# Patient Record
Sex: FEMALE | Race: WHITE | NOT HISPANIC OR LATINO | Employment: UNEMPLOYED | URBAN - NONMETROPOLITAN AREA
[De-identification: names, ages, dates, MRNs, and addresses within clinical notes are randomized per-mention and may not be internally consistent; named-entity substitution may affect disease eponyms.]

---

## 2017-01-19 ENCOUNTER — OFFICE VISIT (OUTPATIENT)
Dept: INTERNAL MEDICINE | Facility: CLINIC | Age: 42
End: 2017-01-19

## 2017-01-19 VITALS
BODY MASS INDEX: 32.79 KG/M2 | RESPIRATION RATE: 12 BRPM | HEIGHT: 65 IN | DIASTOLIC BLOOD PRESSURE: 80 MMHG | OXYGEN SATURATION: 98 % | WEIGHT: 196.8 LBS | SYSTOLIC BLOOD PRESSURE: 110 MMHG | HEART RATE: 81 BPM

## 2017-01-19 DIAGNOSIS — G43.111 INTRACTABLE MIGRAINE WITH AURA WITH STATUS MIGRAINOSUS: ICD-10-CM

## 2017-01-19 DIAGNOSIS — G43.109 MIGRAINE WITH AURA AND WITHOUT STATUS MIGRAINOSUS, NOT INTRACTABLE: Primary | ICD-10-CM

## 2017-01-19 DIAGNOSIS — G47.39 OTHER SLEEP APNEA: ICD-10-CM

## 2017-01-19 PROCEDURE — 96372 THER/PROPH/DIAG INJ SC/IM: CPT | Performed by: FAMILY MEDICINE

## 2017-01-19 PROCEDURE — 99214 OFFICE O/P EST MOD 30 MIN: CPT | Performed by: FAMILY MEDICINE

## 2017-01-19 RX ORDER — PROMETHAZINE HYDROCHLORIDE 25 MG/ML
25 INJECTION, SOLUTION INTRAMUSCULAR; INTRAVENOUS ONCE
Status: COMPLETED | OUTPATIENT
Start: 2017-01-19 | End: 2017-01-19

## 2017-01-19 RX ADMIN — PROMETHAZINE HYDROCHLORIDE 25 MG: 25 INJECTION, SOLUTION INTRAMUSCULAR; INTRAVENOUS at 17:15

## 2017-01-19 NOTE — PROGRESS NOTES
C/O migraine x 2 days. Has taken Ibuprofen. Her former doc in [place] would give her a Toradol shot and it helped.     SUBJECTIVE: Vanna Nicolas is a 41 y.o. female seen for a follow up visit;      Migraine - woke up like this 2 days ago.  Likely has sleep apnea from her sleep study, she is getting another sleep study to determine what she needs.  The last one she had this bad was last month.  She has been on propranolol for several years, it helped at first to decrease frequency of migraines, but hasn't been helping recently.  She is getting them weekly right now, waking up with them so doesn't get a chance to take a med to stop them beforehand.  She is missing more work than her FMLA allows right now.       The following portions of the patient's history were reviewed and updated as appropriate: She  has a past medical history of Malignant melanoma.  She has Depression; Migraine with aura and without status migrainosus, not intractable; Anxiety; and Surgical menopause on hormone replacement therapy on her pertinent problem list.  She  has a past surgical history that includes Skin cancer excision and Total abdominal hysterectomy w/ bilateral salpingoophorectomy (2004).  Her family history includes Alzheimer's disease in her maternal grandfather; COPD in her father; Crohn's disease in her sister; Kidney disease in her maternal grandmother; No Known Problems in her maternal aunt, maternal uncle, paternal aunt, paternal grandfather, paternal grandmother, and paternal uncle; Rheum arthritis in her mother; Ulcerative colitis in her mother. There is no history of Breast cancer or Ovarian cancer.  She  reports that she quit smoking about a year ago. Her smoking use included Cigarettes. She has a 8.00 pack-year smoking history. She has never used smokeless tobacco. She reports that she does not drink alcohol or use illicit drugs..    Review of Systems   Constitutional: Positive for fatigue.   Eyes: Positive for photophobia.  "  Gastrointestinal: Positive for nausea. Negative for vomiting.   Neurological: Negative for numbness.         OBJECTIVE:  Visit Vitals   • /80   • Pulse 81   • Resp 12   • Ht 65\" (165.1 cm)   • Wt 196 lb 12.8 oz (89.3 kg)   • SpO2 98%   • BMI 32.75 kg/m2        Physical Exam   Constitutional: She appears well-developed and well-nourished.   Pt seen in the dark due to severe headache.           ASSESSMENT:   Diagnosis Plan   1. Migraine with aura and without status migrainosus, not intractable  verapamil SR (CALAN-SR) 180 MG CR tablet   2. Intractable migraine with aura with status migrainosus  promethazine (PHENERGAN) injection 25 mg    ketorolac (TORADOL) injection 30 mg   3. Other sleep apnea         Pt advised not to take propranolol tomorrow morning.  Will try switch to verapamil - increase to 240-320 as tolerated.            "

## 2017-01-19 NOTE — MR AVS SNAPSHOT
April Easter 1/19/2017 4:15 PM   Office Visit    Provider:  Fouzia Fuentes MD   Department:  [facility] PRIMARY CARE   Dept Phone:  [phone]                Your Full Care Plan              Today's Medication Changes          These changes are accurate as of: 1/19/17 11:59 PM.  If you have any questions, ask your nurse or doctor.               New Medication(s)Ordered:     verapamil  MG CR tablet   Commonly known as:  CALAN-SR   Take 1 tablet by mouth Every Night.   Started by:  Fouzia Fuentes MD         Stop taking medication(s)listed here:     propranolol  MG 24 hr capsule   Commonly known as:  INDERAL LA   Stopped by:  Fouzia Fuentes MD                Where to Get Your Medications      These medications were sent to [id number] - [place], [place] - [address] - [phone]  - [phone] FX  [address] [place] [address]     Phone:  [phone]     verapamil  MG CR tablet                  Your Updated Medication List          This list is accurate as of: 1/19/17 11:59 PM.  Always use your most recent med list.                albuterol 108 (90 BASE) MCG/ACT inhaler   Commonly known as:  PROVENTIL HFA;VENTOLIN HFA   Inhale 2 puffs Every 6 (Six) Hours As Needed for wheezing.       amitriptyline 25 MG tablet   Commonly known as:  ELAVIL   Take 1 tablet by mouth Every Night.       estradiol 0.5 MG tablet   Commonly known as:  ESTRACE   Take 1 tablet by mouth Daily.       flunisolide 25 MCG/ACT (0.025%) solution nasal spray   Commonly known as:  NASALIDE   Inhale 1 spray Every 12 (Twelve) Hours.       fluocinolone 0.025 % cream   Commonly known as:  SYNALAR       furosemide 20 MG tablet   Commonly known as:  LASIX       gabapentin 300 MG capsule   Commonly known as:  NEURONTIN   Take 1 capsule by mouth 3 (Three) Times a Day.       omeprazole 20 MG capsule   Commonly known as:  priLOSEC   Take 1 capsule by mouth daily.       potassium chloride 10 MEQ CR capsule   Commonly known as:  MICRO-K       ramipril 5 MG capsule   Commonly known as:  ALTACE       triamcinolone 0.1 % cream   Commonly known as:  KENALOG       triamterene-hydrochlorothiazide 37.5-25 MG per tablet   Commonly known as:  MAXZIDE-25       venlafaxine  MG 24 hr capsule   Commonly known as:  EFFEXOR-XR   Take 1 capsule by mouth Daily.       verapamil  MG CR tablet   Commonly known as:  CALAN-SR   Take 1 tablet by mouth Every Night.               You Were Diagnosed With        Codes Comments    Migraine with aura and without status migrainosus, not intractable    -  Primary ICD-10-CM: G43.109  ICD-9-CM: 346.00     Intractable migraine with aura with status migrainosus     ICD-10-CM: G43.111  ICD-9-CM: 346.03       Medications to be Given to You by a Medical Professional     Due       Frequency    (none) promethazine (PHENERGAN) injection 25 mg  Once    (none) ketorolac (TORADOL) injection 30 mg  Once      Instructions     None    Patient Instructions History      [employer] Signup     Our records indicate that you have an active [facility] "[employer] account.    You can view your After Visit Summary by going to [employer] and logging in with your "[employer] username and password.  If you don't have a "[employer] username and password but a parent or guardian has access to your record, the parent or guardian should login with their own "[employer] username and password and access your record to view the After Visit Summary.    If you have questions, you can email [email or web address] or call [phone] to talk to our "[employer] staff.  Remember, "[employer] is NOT to be used for urgent needs.  For medical emergencies, dial 911.               Other Info from Your Visit           Your Appointments     Jan 25, 2017  8:00 PM EST   Special Polysomnogram with [facility] SLEEP ROOM 1   [facility] ([place]) [address]  "[place] [address] [phone]            Feb 15, 2017 11:00 AM EST   Follow Up with Sheree Gonzalez MD   [facility] SLEEP (--)    [address]  [facility] [address] [phone]           Arrive 15 minutes prior to appointment.              Allergies     Penicillins      Tetracyclines & Related        Reason for Visit     Migraine     Med Management           Vital Signs     Blood Pressure Pulse Respirations Height Weight Oxygen Saturation    110/80 81 12 65\" (165.1 cm) 196 lb 12.8 oz (89.3 kg) 98%    Body Mass Index Smoking Status                32.75 kg/m2 Former Smoker          Problems and Diagnoses Noted     Migraines    Intractable migraine with aura with status migrainosus          Medications Administered     ketorolac (TORADOL) injection 30 mg                  promethazine (PHENERGAN) injection 25 mg                    "

## 2017-01-19 NOTE — LETTER
January 19, 2017     Patient: Vanna Nicolas   YOB: 1975   Date of Visit: 1/19/2017       To Whom It May Concern:    It is my medical opinion that Vanna Nicolas may return to work on 1/20/17.  Please excuse her for 1/18 & 1/19.  .           Sincerely,        Fouzia Fuentes MD    CC: No Recipients

## 2017-01-21 PROBLEM — G47.30 SLEEP APNEA: Status: ACTIVE | Noted: 2017-01-21

## 2017-01-25 ENCOUNTER — OFFICE VISIT (OUTPATIENT)
Dept: SLEEP MEDICINE | Facility: HOSPITAL | Age: 42
End: 2017-01-25
Attending: INTERNAL MEDICINE

## 2017-01-25 ENCOUNTER — OUTSIDE FACILITY SERVICE (OUTPATIENT)
Dept: PULMONOLOGY | Facility: CLINIC | Age: 42
End: 2017-01-25

## 2017-01-25 DIAGNOSIS — G47.33 OSA (OBSTRUCTIVE SLEEP APNEA): ICD-10-CM

## 2017-01-25 DIAGNOSIS — G25.81 RESTLESS LEG SYNDROME: ICD-10-CM

## 2017-01-25 PROCEDURE — 95810 POLYSOM 6/> YRS 4/> PARAM: CPT | Performed by: INTERNAL MEDICINE

## 2017-01-25 PROCEDURE — 95810 POLYSOM 6/> YRS 4/> PARAM: CPT

## 2017-02-15 ENCOUNTER — OFFICE VISIT (OUTPATIENT)
Dept: PULMONOLOGY | Facility: CLINIC | Age: 42
End: 2017-02-15

## 2017-02-15 VITALS
DIASTOLIC BLOOD PRESSURE: 60 MMHG | WEIGHT: 196 LBS | SYSTOLIC BLOOD PRESSURE: 112 MMHG | HEART RATE: 102 BPM | HEIGHT: 65 IN | RESPIRATION RATE: 18 BRPM | BODY MASS INDEX: 32.65 KG/M2 | OXYGEN SATURATION: 97 %

## 2017-02-15 DIAGNOSIS — G25.81 RESTLESS LEG SYNDROME: ICD-10-CM

## 2017-02-15 DIAGNOSIS — E66.9 OBESITY (BMI 30-39.9): ICD-10-CM

## 2017-02-15 DIAGNOSIS — G47.33 OSA (OBSTRUCTIVE SLEEP APNEA): Primary | ICD-10-CM

## 2017-02-15 PROCEDURE — 99213 OFFICE O/P EST LOW 20 MIN: CPT | Performed by: INTERNAL MEDICINE

## 2017-02-15 NOTE — PROGRESS NOTES
"Chief Complaint   Patient presents with   • Follow-up         Subjective   April Judy Nicolas is a 41 y.o. female.     History of Present Illness     Patient here for follow-up of RLS and excessive daytime sleepiness.     She tried Requip for RLS and it caused her to have dream enactment so she quit taking the medication after 3 nights.     She still continues to complain of excessive daytime sleepiness.     The following portions of the patient's history were reviewed and updated as appropriate: allergies, current medications, past family history, past medical history, past social history and past surgical history.    Review of Systems   HENT: Negative for sinus pressure, sneezing and sore throat.    Respiratory: Negative for cough, shortness of breath and wheezing.        Objective   Visit Vitals   • /60   • Pulse 102   • Resp 18   • Ht 65\" (165.1 cm)   • Wt 196 lb (88.9 kg)   • SpO2 97%   • BMI 32.62 kg/m2     Physical Exam   Constitutional: She is oriented to person, place, and time. She appears well-developed.   HENT:   Head: Normocephalic and atraumatic.   Mallempati IV/IV.    Eyes: EOM are normal.   Cardiovascular: Normal rate and regular rhythm.    Pulmonary/Chest: Effort normal and breath sounds normal. She has no wheezes.   Musculoskeletal:   Gait normal.   Neurological: She is alert and oriented to person, place, and time.   Skin: Skin is dry.   Psychiatric: She has a normal mood and affect.   Vitals reviewed.          Assessment/Plan   April was seen today for follow-up.    Diagnoses and all orders for this visit:    SUGAR (obstructive sleep apnea)  -     BIPAP / CPAP Adjustment    Restless leg syndrome    Obesity (BMI 30-39.9)           Return in about 8 weeks (around 4/12/2017) for Recheck, For Fatimah.    DISCUSSION (if any):  Reviewed the sleep study as discussed it with the patient. Her AHI was 6.3, she still has symptoms of excessive daytime sleepiness, and she has tried and failed Requip so " I will arrange AutoPAP. She did not have any REM sleep so the score is likely underestimated at an AHI of 6.3.      Errors in dictation may reflect use of voice recognition software and not all errors in transcription may have been detected prior to signing    This document was electronically signed by Sheree Gonzalez MD February 15, 2017  11:44 AM

## 2017-03-02 DIAGNOSIS — F33.1 MODERATE EPISODE OF RECURRENT MAJOR DEPRESSIVE DISORDER (HCC): ICD-10-CM

## 2017-03-02 RX ORDER — VENLAFAXINE HYDROCHLORIDE 150 MG/1
150 CAPSULE, EXTENDED RELEASE ORAL DAILY
Qty: 30 CAPSULE | Refills: 3 | Status: SHIPPED | OUTPATIENT
Start: 2017-03-02 | End: 2017-05-25 | Stop reason: SDUPTHER

## 2017-03-02 RX ORDER — ESTRADIOL 0.5 MG/1
0.5 TABLET ORAL DAILY
Qty: 30 TABLET | Refills: 3 | Status: SHIPPED | OUTPATIENT
Start: 2017-03-02 | End: 2017-06-27 | Stop reason: SDUPTHER

## 2017-03-20 ENCOUNTER — OFFICE VISIT (OUTPATIENT)
Dept: INTERNAL MEDICINE | Facility: CLINIC | Age: 42
End: 2017-03-20

## 2017-03-20 VITALS
BODY MASS INDEX: 33.52 KG/M2 | HEIGHT: 65 IN | OXYGEN SATURATION: 98 % | SYSTOLIC BLOOD PRESSURE: 112 MMHG | HEART RATE: 102 BPM | WEIGHT: 201.2 LBS | RESPIRATION RATE: 12 BRPM | DIASTOLIC BLOOD PRESSURE: 80 MMHG

## 2017-03-20 DIAGNOSIS — M62.830 MUSCLE SPASM OF BACK: Primary | ICD-10-CM

## 2017-03-20 DIAGNOSIS — M53.3 SACROILIAC JOINT DYSFUNCTION OF RIGHT SIDE: ICD-10-CM

## 2017-03-20 DIAGNOSIS — M22.2X2 PATELLOFEMORAL STRESS SYNDROME OF LEFT KNEE: ICD-10-CM

## 2017-03-20 PROCEDURE — 99214 OFFICE O/P EST MOD 30 MIN: CPT | Performed by: FAMILY MEDICINE

## 2017-03-20 RX ORDER — DICLOFENAC SODIUM 75 MG/1
75 TABLET, DELAYED RELEASE ORAL 2 TIMES DAILY PRN
Qty: 60 TABLET | Refills: 1 | Status: SHIPPED | OUTPATIENT
Start: 2017-03-20 | End: 2017-04-23 | Stop reason: SDUPTHER

## 2017-03-20 RX ORDER — POTASSIUM CHLORIDE 750 MG/1
10 CAPSULE, EXTENDED RELEASE ORAL DAILY PRN
Qty: 30 CAPSULE | Refills: 5 | Status: SHIPPED | OUTPATIENT
Start: 2017-03-20 | End: 2017-10-19 | Stop reason: SDUPTHER

## 2017-03-20 RX ORDER — CYCLOBENZAPRINE HCL 10 MG
10 TABLET ORAL 2 TIMES DAILY PRN
Qty: 30 TABLET | Refills: 1 | Status: SHIPPED | OUTPATIENT
Start: 2017-03-20 | End: 2020-09-16

## 2017-03-20 NOTE — PATIENT INSTRUCTIONS
Patellar Femoral Syndrome Rehab    Patellofemoral Pain Syndrome  Patellofemoral pain syndrome is a condition that involves a softening or breakdown of the tissue (cartilage) on the underside of your kneecap (patella). This causes pain in the front of the knee. The condition is also called runner's knee or chondromalacia patella. Patellofemoral pain syndrome is most common in young adults who are active in sports.  Your knee is the largest joint in your body. The patella covers the front of your knee and is attached to muscles above and below your knee. The underside of the patella is covered with a smooth type of cartilage (synovium). The smooth surface helps the patella glide easily when you move your knee. Patellofemoral pain syndrome causes swelling in the joint linings and bone surfaces in your knee.   CAUSES   Patellofemoral pain syndrome can be caused by:  · Overuse.  · Poor alignment of your knee joints.  · Weak leg muscles.  · A direct blow to your kneecap.  RISK FACTORS  You may be at risk for patellofemoral pain syndrome if you:  · Do a lot of activities that can wear down your kneecap. These include:    Running.    Squatting.    Climbing stairs.  · Start a new physical activity or exercise program.  · Wear shoes that do not fit well.  · Do not have good leg strength.  · Are overweight.  SIGNS AND SYMPTOMS   Knee pain is the most common symptom of patellofemoral pain syndrome. This may feel like a dull, aching pain underneath your patella, in the front of your knee. There may be a popping or cracking sound when you move your knee. Pain may get worse with:  · Exercise.  · Climbing stairs.  · Running.  · Jumping.  · Squatting.  · Kneeling.  · Sitting for a long time.  · Moving or pushing on your patella.  DIAGNOSIS   Your health care provider may be able to diagnose patellofemoral pain syndrome from your symptoms and medical history. You may be asked about your recent physical activities and which  ones cause knee pain. Your health care provider may do a physical exam with certain tests to confirm the diagnosis. These may include:  · Moving your patella back and forth.  · Checking your range of knee motion.  · Having you squat or jump to see if you have pain.  · Checking the strength of your leg muscles.  An MRI of the knee may also be done.  TREATMENT   Patellofemoral pain syndrome can usually be treated at home with rest, ice, compression, and elevation (RICE). Other treatments may include:  · Nonsteroidal anti-inflammatory drugs (NSAIDs).  · Physical therapy to stretch and strengthen your leg muscles.  · Shoe inserts (orthotics) to take stress off your knee.  · A knee brace or knee support.  · Surgery to remove damaged cartilage or move the patella to a better position. The need for surgery is rare.  HOME CARE INSTRUCTIONS   · Take medicines only as directed by your health care provider.  · Rest your knee.  ¨ When resting, keep your knee raised above the level of your heart.  ¨ Avoid activities that cause knee pain.  · Apply ice to the injured area:  ¨ Put ice in a plastic bag.  ¨ Place a towel between your skin and the bag.  ¨ Leave the ice on for 20 minutes, 2-3 times a day.  · Use splints, braces, knee supports, or walking aids as directed by your health care provider.  · Perform stretching and strengthening exercises as directed by your health care provider or physical therapist.  · Keep all follow-up visits as directed by your health care provider. This is important.  SEEK MEDICAL CARE IF:   · Your symptoms get worse.  · You are not improving with home care.  MAKE SURE YOU:  · Understand these instructions.  · Will watch your condition.  · Will get help right away if you are not doing well or get worse.     This information is not intended to replace advice given to you by your health care provider. Make sure you discuss any questions you have with your health care provider.     Document Released:  "12/06/2010 Document Revised: 01/08/2016 Document Reviewed: 03/09/2015  [employer]® Patient Information ©2016 [employer]    EXERCISES      RANGE OF MOTION (ROM) AND STRETCHING EXERCISES -      This phase will help you gain even more flexibility in your knee to allow you to return to your previous daily and recreational activities. It is important that you do not force any exercise. Never continue an exercise that increases your pain or gives you the sensation that your knee is about to \"pop out\" or dislocate. Inform your physician, physical therapist or  of any exercises that you need to discontinue, due to increasing either of these symptoms.  While completing these exercises, remember:   · Restoring tissue flexibility helps normal motion to return to the joints. This allows healthier, less painful movement and activity.  · An effective stretch should be held for at least 30 seconds.  · A stretch should never be painful. You should only feel a gentle lengthening or release in the stretched tissue.  STRETCH - Quadriceps, Prone  · Lie on your stomach on a firm surface, such as a bed or padded floor.  · Bend your right / left knee and grasp your ankle. If you are unable to reach your ankle or pant leg, use a belt around your foot to lengthen your reach.  · Gently pull your heel toward your buttocks. Your knee should not slide out to the side. You should feel a stretch in the front of your thigh and knee.  · Hold this position for 5-10 seconds.  Repeat 3-5 times. Complete this stretch 1-2 times per day.     STRETCH - Hamstrings, Supine  · Lie on your back. Loop a belt or towel over the ball of your right / left foot.  · Straighten your right / left knee and slowly pull on the belt to raise your leg. Do not allow the right / left knee to bend. Keep your opposite leg flat on the floor.  · Raise the leg until you feel a gentle stretch behind your right / left knee or thigh. Hold this position for 5-10 " seconds.  Repeat 3-5 times. Complete this stretch 1-2 times per day.     STRETCH - Hamstrings, Doorway  · Lie on your back with your right / left leg extended and resting on the wall and the opposite leg flat on the ground through the door. At first, position your bottom farther away from the wall.  · Keep your right / left knee straight. If you feel a stretch behind your knee or thigh, hold this position for 15-30 seconds.  · If you do not feel a stretch, scoot your bottom closer to the door, and hold 15-30 seconds.  Repeat 2-3 times. Complete this stretch 2 times per day.     STRETCH - Iliotibial Band  · On the floor or bed, lie on your side so your right / left leg is on top. Bend your knee and grab your ankle.  · Slowly bring your knee back so that your thigh is in line with your trunk. Keep your heel at your buttocks and gently arch your back so your head, shoulders and hips line up.  · Slowly lower your leg so that your knee approaches the floor, until you feel a gentle stretch on the outside of your right / left thigh. If you do not feel a stretch and your knee will not fall farther, place the heel of your opposite foot on top of your knee and pull your thigh down farther.  · Hold this stretch for 5-10 seconds.  Repeat 3-5 times. Complete this stretch 1-2 times per day.      STRETCH - Lateral Patellar Mobilizations, Seated  · While sitting, bend your knee 90 degrees or a little less. Place your foot flat on the floor.  · Place the inside of your palm at the base of your thumb, on the inside border of your kneecap.  · Press down on the inside border, so that the outside border lifts up slightly.  · You should feel a slight stretch on the outside edge of your kneecap. Hold this position for 5 seconds.  Repeat 5 times. Complete this stretch 2 times per day.       STRENGTHENING EXERCISES -   These are some of the exercises you may progress to in your rehabilitation program. It is critical that you follow the  instructions of your caregiver and not progress to these Phase II exercises until directed. You may continue with all Phase I strengthening exercises. Remember:   · Strong muscles with good endurance tolerate stress better.  · Do the exercises as initially prescribed by your caregiver. Progress slowly with each exercise, gradually increasing the number of repetitions and weight used under his or her guidance.  ·   STRENGTH - Quadriceps, Short Arcs  · Lie on your back. Place a 3-5 inch towel roll under your right / left knee, so that the knee bends slightly.  · Raise only your lower leg by tightening the muscles in the front of your thigh. Do not allow your thigh to rise.  · Hold this position for 5 -10 seconds.  Repeat 10 times. Complete this exercise 1-2 times per day.       STRENGTH - Hip Extensors, Bridge  · Lie on your back on a firm surface. Bend your knees and place your feet flat on the floor.  · Tighten your buttocks muscles and lift your bottom off the floor until your trunk is level with your thighs. You should feel the muscles in your buttocks and back of your thighs working. If you do not feel these muscles, slide your feet 1-2 inches further away from your buttocks.  · Hold this position for 10 seconds.  · Slowly lower your hips to the starting position and allow your buttock muscles to relax completely before beginning the next repetition.  · If this exercise is too easy, you may cross your arms over your chest.  Repeat 3-5 times. Complete this exercise 1-2 times per day.     STRENGTH - Quadriceps, Step-Ups  · Use a thick book, step or step stool that is 2-3 inches tall.  · Hold a wall or counter for balance only, not support.  · Slowly step up with your right / left foot, keeping your knee in line with your hip and foot. Do not allow your knee to bend so far that you cannot see your toes.  · Slowly unlock your knee and lower yourself to the starting position. Your muscles, not gravity, should lower  you.  Repeat 5-10 times. Complete this exercise 1-2 times per day.     STRENGTH - Quadriceps, Wall Slides   Follow guidelines for form closely. Increased knee pain often results from poorly placed feet or knees.  · Lean against a smooth wall or door and walk your feet out 18-24 inches. Place your feet hip width apart.  · Slowly slide down the wall or door until your knees bend 30- 45 degrees.* Keep your knees over your heels, not your toes, and in line with your hips, not falling to either side.  · Hold for 5-10 seconds. Stand up to rest for 5-10 seconds between each repetition.  Repeat 3-5 times. Complete this exercise 1-2 times per day.      STRENGTH - Quad/VMO, Isometric  · Sit in a chair with your right / left knee slightly bent. With your fingertips, feel the VMO muscle just above the inside of your knee. The VMO is important in controlling the position of your kneecap.  · Keeping your fingertips on this muscle. Without actually moving your leg, attempt to drive your knee down as if straightening your leg. You should feel your VMO tense. If you have a difficult time, you may wish to try the same exercise on your healthy knee first.  · Tense this muscle as hard as you can without increasing any knee pain.  · Hold for 5-10 seconds. Relax the muscles slowly and completely between each repetition.  Repeat 5 times. Complete exercise 2 times per day.      This information is not intended to replace advice given to you by your health care provider. Make sure you discuss any questions you have with your health care provider.        Low Back Strain With Rehab  A strain is an injury in which a tendon or muscle is torn. The muscles and tendons of the lower back are vulnerable to strains. However, these muscles and tendons are very strong and require a great force to be injured. Strains are classified into three categories. Grade 1 strains cause pain, but the tendon is not lengthened. Grade 2 strains include a lengthened  ligament, due to the ligament being stretched or partially ruptured. With grade 2 strains there is still function, although the function may be decreased. Grade 3 strains involve a complete tear of the tendon or muscle, and function is usually impaired.  SYMPTOMS   · Pain in the lower back.  · Pain that affects one side more than the other.  · Pain that gets worse with movement and may be felt in the hip, buttocks, or back of the thigh.  · Muscle spasms of the muscles in the back.  · Swelling along the muscles of the back.  · Loss of strength of the back muscles.  · Crackling sound (crepitation) when the muscles are touched.  CAUSES   Lower back strains occur when a force is placed on the muscles or tendons that is greater than they can handle. Common causes of injury include:  · Prolonged overuse of the muscle-tendon units in the lower back, usually from incorrect posture.  · A single violent injury or force applied to the back.  RISK INCREASES WITH:  · Sports that involve twisting forces on the spine or a lot of bending at the waist (football, rugby, weightlifting, bowling, golf, tennis, speed skating, racquetball, swimming, running, gymnastics, diving).  · Poor strength and flexibility.  · Failure to warm up properly before activity.  · Family history of lower back pain or disk disorders.  · Previous back injury or surgery (especially fusion).  · Poor posture with lifting, especially heavy objects.  · Prolonged sitting, especially with poor posture.  PREVENTION   · Learn and use proper posture when sitting or lifting (maintain proper posture when sitting, lift using the knees and legs, not at the waist).  · Warm up and stretch properly before activity.  · Allow for adequate recovery between workouts.  · Maintain physical fitness:    Strength, flexibility, and endurance.    Cardiovascular fitness.  PROGNOSIS   If treated properly, lower back strains usually heal within 6 weeks.  RELATED COMPLICATIONS   · Recurring  symptoms, resulting in a chronic problem.  · Chronic inflammation, scarring, and partial muscle-tendon tear.  · Delayed healing or resolution of symptoms.  · Prolonged disability.  TREATMENT   Treatment first involves the use of ice and medicine, to reduce pain and inflammation. The use of strengthening and stretching exercises may help reduce pain with activity. These exercises may be performed at home or with a therapist. Severe injuries may require referral to a therapist for further evaluation and treatment, such as ultrasound. Your caregiver may advise that you wear a back brace or corset, to help reduce pain and discomfort. Often, prolonged bed rest results in greater harm then benefit. Corticosteroid injections may be recommended. However, these should be reserved for the most serious cases. It is important to avoid using your back when lifting objects. At night, sleep on your back on a firm mattress with a pillow placed under your knees. If non-surgical treatment is unsuccessful, surgery may be needed.   MEDICATION   · If pain medicine is needed, nonsteroidal anti-inflammatory medicines (aspirin and ibuprofen), or other minor pain relievers (acetaminophen), are often advised.  · Do not take pain medicine for 7 days before surgery.  · Prescription pain relievers may be given, if your caregiver thinks they are needed. Use only as directed and only as much as you need.  · Ointments applied to the skin may be helpful.  · Corticosteroid injections may be given by your caregiver. These injections should be reserved for the most serious cases, because they may only be given a certain number of times.  HEAT AND COLD  · Cold treatment (icing) should be applied for 10 to 15 minutes every 2 to 3 hours for inflammation and pain, and immediately after activity that aggravates your symptoms. Use ice packs or an ice massage.  · Heat treatment may be used before performing stretching and strengthening activities prescribed  by your caregiver, physical therapist, or . Use a heat pack or a warm water soak.  SEEK MEDICAL CARE IF:   · Symptoms get worse or do not improve in 2 to 4 weeks, despite treatment.  · You develop numbness, weakness, or loss of bowel or bladder function.  · New, unexplained symptoms develop. (Drugs used in treatment may produce side effects.)      EXERCISES   RANGE OF MOTION (ROM) AND STRETCHING EXERCISES - Low Back Strain  Most people with lower back pain will find that their symptoms get worse with excessive bending forward (flexion) or arching at the lower back (extension). The exercises which will help resolve your symptoms will focus on the opposite motion.   Your physician, physical therapist or  will help you determine which exercises will be most helpful to resolve your lower back pain. Do not complete any exercises without first consulting with your caregiver. Discontinue any exercises which make your symptoms worse until you speak to your caregiver.   If you have pain, numbness or tingling which travels down into your buttocks, leg or foot, the goal of the therapy is for these symptoms to move closer to your back and eventually resolve. Sometimes, these leg symptoms will get better, but your lower back pain may worsen. This is typically an indication of progress in your rehabilitation. Be very alert to any changes in your symptoms and the activities in which you participated in the 24 hours prior to the change. Sharing this information with your caregiver will allow him/her to most efficiently treat your condition.  · These exercises may help you when beginning to rehabilitate your injury. Your symptoms may resolve with or without further involvement from your physician, physical therapist or . While completing these exercises, remember:  · Restoring tissue flexibility helps normal motion to return to the joints. This allows healthier, less painful movement  and activity.  · An effective stretch should be held for at least 30 seconds.  · A stretch should never be painful. You should only feel a gentle lengthening or release in the stretched tissue.  FLEXION RANGE OF MOTION AND STRETCHING EXERCISES:  STRETCH - Flexion, Single Knee to Chest   · Lie on a firm bed or floor with both legs extended in front of you.  · Keeping one leg in contact with the floor, bring your opposite knee to your chest. Hold your leg in place by either grabbing behind your thigh or at your knee.  · Pull until you feel a gentle stretch in your lower back. Hold 5-10 seconds.  · Slowly release your grasp and repeat the exercise with the opposite side.  Repeat 3-5 times. Complete this exercise 2 times per day.   STRETCH - Flexion, Double Knee to Chest   · Lie on a firm bed or floor with both legs extended in front of you.  · Keeping one leg in contact with the floor, bring your opposite knee to your chest.  · Tense your stomach muscles to support your back and then lift your other knee to your chest. Hold your legs in place by either grabbing behind your thighs or at your knees.  · Pull both knees toward your chest until you feel a gentle stretch in your lower back. Hold 5-10 seconds.  · Tense your stomach muscles and slowly return one leg at a time to the floor.  Repeat 3-5 times. Complete this exercise 2 times per day.   STRETCH - Low Trunk Rotation  · Lie on a firm bed or floor. Keeping your legs in front of you, bend your knees so they are both pointed toward the ceiling and your feet are flat on the floor.  · Extend your arms out to the side. This will stabilize your upper body by keeping your shoulders in contact with the floor.  · Gently and slowly drop both knees together to one side until you feel a gentle stretch in your lower back. Hold for 5-10 seconds.  · Tense your stomach muscles to support your lower back as you bring your knees back to the starting position. Repeat the exercise to  "the other side.  Repeat 3-5 times. Complete this exercise 2 times per day   EXTENSION RANGE OF MOTION AND FLEXIBILITY EXERCISES:  STRETCH - Extension, Prone on Elbows   · Lie on your stomach on the floor, a bed will be too soft. Place your palms about shoulder width apart and at the height of your head.  · Place your elbows under your shoulders. If this is too painful, stack pillows under your chest.  · Allow your body to relax so that your hips drop lower and make contact more completely with the floor.  · Hold this position for 5-10 seconds.  · Slowly return to lying flat on the floor.  Repeat 3-5 times. Complete this exercise 2 times per day.   RANGE OF MOTION - Extension, Prone Press Ups  · Lie on your stomach on the floor, a bed will be too soft. Place your palms about shoulder width apart and at the height of your head.  · Keeping your back as relaxed as possible, slowly straighten your elbows while keeping your hips on the floor. You may adjust the placement of your hands to maximize your comfort. As you gain motion, your hands will come more underneath your shoulders.  · Hold this position 5-10 seconds.  · Slowly return to lying flat on the floor.  Repeat 3-5 times. Complete this exercise 2 times per day.   RANGE OF MOTION- Quadruped, Neutral Spine   · Assume a hands and knees position on a firm surface. Keep your hands under your shoulders and your knees under your hips. You may place padding under your knees for comfort.  · Drop your head and point your tail bone toward the ground below you. This will round out your lower back like an angry cat. Hold this position for 5-10 seconds.  · Slowly lift your head and release your tail bone so that your back sags into a large arch, like an old horse.  · Hold this position for 5-10 seconds.  · Repeat this until you feel limber in your lower back.  · Now, find your \"sweet spot.\" This will be the most comfortable position somewhere between the two previous positions. " "This is your neutral spine. Once you have found this position, tense your stomach muscles to support your lower back.  · Hold this position for 5-10 seconds.  Repeat 3-5 times. Complete this exercise 2 times per day.       STRENGTHENING EXERCISES - Low Back Strain  These exercises may help you when beginning to rehabilitate your injury. These exercises should be done near your \"sweet spot.\" This is the neutral, low-back arch, somewhere between fully rounded and fully arched, that is your least painful position. When performed in this safe range of motion, these exercises can be used for people who have either a flexion or extension based injury. These exercises may resolve your symptoms with or without further involvement from your physician, physical therapist or . While completing these exercises, remember:   · Muscles can gain both the endurance and the strength needed for everyday activities through controlled exercises.  · Complete these exercises as instructed by your physician, physical therapist or . Increase the resistance and repetitions only as guided.  · You may experience muscle soreness or fatigue, but the pain or discomfort you are trying to eliminate should never worsen during these exercises. If this pain does worsen, stop and make certain you are following the directions exactly. If the pain is still present after adjustments, discontinue the exercise until you can discuss the trouble with your caregiver.  STRENGTHENING - Deep Abdominals, Pelvic Tilt  · Lie on a firm bed or floor. Keeping your legs in front of you, bend your knees so they are both pointed toward the ceiling and your feet are flat on the floor.  · Tense your lower abdominal muscles to press your lower back into the floor. This motion will rotate your pelvis so that your tail bone is scooping upwards rather than pointing at your feet or into the floor.  · With a gentle tension and even breathing, hold " this position for 5 seconds.  Repeat 5 times. Complete this exercise 1 times per day.   STRENGTHENING - Abdominals, Crunches   · Lie on a firm bed or floor. Keeping your legs in front of you, bend your knees so they are both pointed toward the ceiling and your feet are flat on the floor. Cross your arms over your chest.  · Slightly tip your chin down without bending your neck.  · Tense your abdominals and slowly lift your trunk high enough to just clear your shoulder blades. Lifting higher can put excessive stress on the lower back and does not further strengthen your abdominal muscles.  · Control your return to the starting position.  Repeat 5-10 times. Complete this exercise 1 times per day.   STRENGTHENING - Quadruped, Opposite UE/LE Lift   · Assume a hands and knees position on a firm surface. Keep your hands under your shoulders and your knees under your hips. You may place padding under your knees for comfort.  · Find your neutral spine and gently tense your abdominal muscles so that you can maintain this position. Your shoulders and hips should form a rectangle that is parallel with the floor and is not twisted.  · Keeping your trunk steady, lift your right hand no higher than your shoulder and then your left leg no higher than your hip. Make sure you are not holding your breath. Hold this position 5-10 seconds.  · Continuing to keep your abdominal muscles tense and your back steady, slowly return to your starting position. Repeat with the opposite arm and leg.  Repeat 3-5 times. Complete this exercise 2 times per day.   STRENGTHENING - Lower Abdominals, Double Knee Lift  · Lie on a firm bed or floor. Keeping your legs in front of you, bend your knees so they are both pointed toward the ceiling and your feet are flat on the floor.  · Tense your abdominal muscles to brace your lower back and slowly lift both of your knees until they come over your hips. Be certain not to hold your breath.  · Hold 5-10 seconds.  Using your abdominal muscles, return to the starting position in a slow and controlled manner.  Repeat 3-5 times. Complete this exercise 2 times per day.   POSTURE AND BODY MECHANICS CONSIDERATIONS - Low Back Strain  Keeping correct posture when sitting, standing or completing your activities will reduce the stress put on different body tissues, allowing injured tissues a chance to heal and limiting painful experiences. The following are general guidelines for improved posture. Your physician or physical therapist will provide you with any instructions specific to your needs. While reading these guidelines, remember:  · The exercises prescribed by your provider will help you have the flexibility and strength to maintain correct postures.  · The correct posture provides the best environment for your joints to work. All of your joints have less wear and tear when properly supported by a spine with good posture. This means you will experience a healthier, less painful body.  · Correct posture must be practiced with all of your activities, especially prolonged sitting and standing. Correct posture is as important when doing repetitive low-stress activities (typing) as it is when doing a single heavy-load activity (lifting).  RESTING POSITIONS  Consider which positions are most painful for you when choosing a resting position. If you have pain with flexion-based activities (sitting, bending, stooping, squatting), choose a position that allows you to rest in a less flexed posture. You would want to avoid curling into a fetal position on your side. If your pain worsens with extension-based activities (prolonged standing, working overhead), avoid resting in an extended position such as sleeping on your stomach. Most people will find more comfort when they rest with their spine in a more neutral position, neither too rounded nor too arched. Lying on a non-sagging bed on your side with a pillow between your knees, or on your  back with a pillow under your knees will often provide some relief. Keep in mind, being in any one position for a prolonged period of time, no matter how correct your posture, can still lead to stiffness.  PROPER SITTING POSTURE  In order to minimize stress and discomfort on your spine, you must sit with correct posture. Sitting with good posture should be effortless for a healthy body. Returning to good posture is a gradual process. Many people can work toward this most comfortably by using various supports until they have the flexibility and strength to maintain this posture on their own.  When sitting with proper posture, your ears will fall over your shoulders and your shoulders will fall over your hips. You should use the back of the chair to support your upper back. Your lower back will be in a neutral position, just slightly arched. You may place a small pillow or folded towel at the base of your lower back for support.   When working at a desk, create an environment that supports good, upright posture. Without extra support, muscles tire, which leads to excessive strain on joints and other tissues. Keep these recommendations in mind:  CHAIR:  · A chair should be able to slide under your desk when your back makes contact with the back of the chair. This allows you to work closely.  · The chair's height should allow your eyes to be level with the upper part of your monitor and your hands to be slightly lower than your elbows.  BODY POSITION  · Your feet should make contact with the floor. If this is not possible, use a foot rest.  · Keep your ears over your shoulders. This will reduce stress on your neck and lower back.  INCORRECT SITTING POSTURES   If you are feeling tired and unable to assume a healthy sitting posture, do not slouch or slump. This puts excessive strain on your back tissues, causing more damage and pain. Healthier options include:  · Using more support, like a lumbar pillow.  · Switching  tasks to something that requires you to be upright or walking.  · Talking a brief walk.  · Lying down to rest in a neutral-spine position.  PROLONGED STANDING WHILE SLIGHTLY LEANING FORWARD   When completing a task that requires you to lean forward while standing in one place for a long time, place either foot up on a stationary 2-4 inch high object to help maintain the best posture. When both feet are on the ground, the lower back tends to lose its slight inward curve. If this curve flattens (or becomes too large), then the back and your other joints will experience too much stress, tire more quickly, and can cause pain.  CORRECT STANDING POSTURES  Proper standing posture should be assumed with all daily activities, even if they only take a few moments, like when brushing your teeth. As in sitting, your ears should fall over your shoulders and your shoulders should fall over your hips. You should keep a slight tension in your abdominal muscles to brace your spine. Your tailbone should point down to the ground, not behind your body, resulting in an over-extended swayback posture.   INCORRECT STANDING POSTURES   Common incorrect standing postures include a forward head, locked knees and/or an excessive swayback.  WALKING  Walk with an upright posture. Your ears, shoulders and hips should all line-up.  PROLONGED ACTIVITY IN A FLEXED POSITION  When completing a task that requires you to bend forward at your waist or lean over a low surface, try to find a way to stabilize 3 out of 4 of your limbs. You can place a hand or elbow on your thigh or rest a knee on the surface you are reaching across. This will provide you more stability so that your muscles do not fatigue as quickly. By keeping your knees relaxed, or slightly bent, you will also reduce stress across your lower back.  CORRECT LIFTING TECHNIQUES  DO :   · Assume a wide stance. This will provide you more stability and the opportunity to get as close as possible  to the object which you are lifting.  · Tense your abdominals to brace your spine. Bend at the knees and hips. Keeping your back locked in a neutral-spine position, lift using your leg muscles. Lift with your legs, keeping your back straight.  · Test the weight of unknown objects before attempting to lift them.  · Try to keep your elbows locked down at your sides in order get the best strength from your shoulders when carrying an object.  · Always ask for help when lifting heavy or awkward objects.  INCORRECT LIFTING TECHNIQUES  DO NOT:   · Lock your knees when lifting, even if it is a small object.  · Bend and twist. Pivot at your feet or move your feet when needing to change directions.  · Assume that you can safely  even a paper clip without proper posture.     This information is not intended to replace advice given to you by your health care provider. Make sure you discuss any questions you have with your health care provider.     Document Released: 12/18/2006 Document Revised: 01/08/2016 Document Reviewed: 04/01/2010  [employer]® Patient Information ©2016 [employer], [place].

## 2017-03-20 NOTE — PROGRESS NOTES
C/O right lower back pain above right buttock. Sat in the floor for a while Saturday night, hurt when she got up. Yesterday pain was much worse. Hurts to bear weight on right side.   C/O left knee pain behind left knee cap x 2 weeks.     SUBJECTIVE: April Judy Nicolas is a 41 y.o. female seen for a follow up visit;    Back Pain  Patient presents for evaluation of low back problems. Symptoms have been present for 2 days and include pain in right lower back/SI area (stabbing in character; 4-9/10 in severity) and stiffness in right leg, back. Initial inciting event: sat in the floor cleaning something, cross legs. Symptoms are worse variable. Alleviating factors identifiable by the patient are none. Aggravating factors identifiable by the patient are bending forwards and bending sideways. Treatments initiated by the patient: heat, sarina cabrera, gabapentin. Previous lower back problems: happened before, but not as severe. Previous work up: none. Previous treatments: muscle relaxants.    Knee Pain  Patient presents with knee pain involving the left knee. Onset of the symptoms was several weeks ago. Inciting event: none known. Current symptoms include pain located behind knee cap. Pain is aggravated by going up and down stairs, kneeling, rising after sitting and squatting. Patient has had no prior knee problems. Evaluation to date: none. Treatment to date: none.       The following portions of the patient's history were reviewed and updated as appropriate: She  has a past medical history of Malignant melanoma.  She has Depression; Migraine with aura and without status migrainosus, not intractable; Anxiety; and Surgical menopause on hormone replacement therapy on her pertinent problem list.  She  has a past surgical history that includes Skin cancer excision and Total abdominal hysterectomy w/ bilateral salpingoophorectomy (2004).  Her family history includes Alzheimer's disease in her maternal grandfather; COPD in her  "father; Crohn's disease in her sister; Kidney disease in her maternal grandmother; No Known Problems in her maternal aunt, maternal uncle, paternal aunt, paternal grandfather, paternal grandmother, and paternal uncle; Rheum arthritis in her mother; Ulcerative colitis in her mother. There is no history of Breast cancer or Ovarian cancer.  She  reports that she quit smoking about 13 months ago. Her smoking use included Cigarettes. She has a 8.00 pack-year smoking history. She has never used smokeless tobacco. She reports that she does not drink alcohol or use illicit drugs..    Review of Systems   Constitutional: Positive for fatigue.   Genitourinary: Negative for difficulty urinating.   Musculoskeletal: Positive for back pain, gait problem, joint swelling and myalgias.         OBJECTIVE:  /80  Pulse 102  Resp 12  Ht 65\" (165.1 cm)  Wt 201 lb 3.2 oz (91.3 kg)  SpO2 98%  BMI 33.48 kg/m2     Physical Exam   Constitutional: She appears well-developed and well-nourished. No distress.   Musculoskeletal:        Back:      Left Knee Exam     Tenderness   The patient is experiencing tenderness in the pes anserinus.    Tests   McMurrays:  Medial - Negative      Lateral - Negative  Lachman:  Anterior - Negative    Posterior - Negative  Drawer:       Anterior - Negative     Posterior - Negative  Patellar Apprehension: Yes            ASSESSMENT:  1. Muscle spasm of back  worsening  - cyclobenzaprine (FLEXERIL) 10 MG tablet; Take 1 tablet by mouth 2 (Two) Times a Day As Needed for Muscle Spasms.  Dispense: 30 tablet; Refill: 1  - diclofenac (VOLTAREN) 75 MG EC tablet; Take 1 tablet by mouth 2 (Two) Times a Day As Needed (back pain).  Dispense: 60 tablet; Refill: 1    2. Patellofemoral stress syndrome of left knee  New   - cyclobenzaprine (FLEXERIL) 10 MG tablet; Take 1 tablet by mouth 2 (Two) Times a Day As Needed for Muscle Spasms.  Dispense: 30 tablet; Refill: 1  - diclofenac (VOLTAREN) 75 MG EC tablet; Take 1 tablet " by mouth 2 (Two) Times a Day As Needed (back pain).  Dispense: 60 tablet; Refill: 1    3. Sacroiliac joint dysfunction of right side  worsening  - cyclobenzaprine (FLEXERIL) 10 MG tablet; Take 1 tablet by mouth 2 (Two) Times a Day As Needed for Muscle Spasms.  Dispense: 30 tablet; Refill: 1  - diclofenac (VOLTAREN) 75 MG EC tablet; Take 1 tablet by mouth 2 (Two) Times a Day As Needed (back pain).  Dispense: 60 tablet; Refill: 1

## 2017-04-13 ENCOUNTER — OFFICE VISIT (OUTPATIENT)
Dept: PULMONOLOGY | Facility: CLINIC | Age: 42
End: 2017-04-13

## 2017-04-13 VITALS
OXYGEN SATURATION: 97 % | HEIGHT: 65 IN | RESPIRATION RATE: 12 BRPM | WEIGHT: 198.6 LBS | SYSTOLIC BLOOD PRESSURE: 120 MMHG | HEART RATE: 102 BPM | BODY MASS INDEX: 33.09 KG/M2 | DIASTOLIC BLOOD PRESSURE: 82 MMHG

## 2017-04-13 DIAGNOSIS — G47.33 OSA (OBSTRUCTIVE SLEEP APNEA): Primary | ICD-10-CM

## 2017-04-13 PROCEDURE — 99214 OFFICE O/P EST MOD 30 MIN: CPT | Performed by: NURSE PRACTITIONER

## 2017-04-13 RX ORDER — PRAMIPEXOLE DIHYDROCHLORIDE 1 MG/1
1 TABLET ORAL NIGHTLY
Qty: 30 TABLET | Refills: 5 | Status: SHIPPED | OUTPATIENT
Start: 2017-04-13 | End: 2017-06-27 | Stop reason: SDUPTHER

## 2017-04-13 NOTE — PROGRESS NOTES
"Chief Complaint   Patient presents with   • Follow-up         Subjective   April Judy Nicolas is a 41 y.o. female.     History of Present Illness   The patient is here for follow-up of SUGAR.     She is using CPAP every night at the current pressure of 6/16 with a full face mask. The full face mask makes her face sore and the mask leaks some. Her  will wake her some nights and tell her that the mask is leaking. She has less daytime sleepiness but becomes sleepy around 6 pm many evenings.     She also continues to have leg movement when sleeping. She tried Requip but had nightmares and kicked her  while sleeping.    The following portions of the patient's history were reviewed and updated as appropriate: allergies, current medications, past family history, past medical history, past social history and past surgical history.    Review of Systems   HENT: Negative for sinus pressure, sneezing and sore throat.    Respiratory: Negative for cough, shortness of breath and wheezing.        Objective   /82  Pulse 102  Resp 12  Ht 65\" (165.1 cm)  Wt 198 lb 9.6 oz (90.1 kg)  SpO2 97%  BMI 33.05 kg/m2     Physical Exam   Constitutional: She is oriented to person, place, and time. She appears well-developed and well-nourished.   HENT:   Head: Normocephalic and atraumatic.   Crowded oropharynx.  Mallempati IV/IV.   Eyes: EOM are normal.   Cardiovascular: Normal rate and regular rhythm.    Pulmonary/Chest: Effort normal and breath sounds normal.   Musculoskeletal:   Gait normal.   Neurological: She is alert and oriented to person, place, and time.   Skin: Skin is dry.   Psychiatric: She has a normal mood and affect.   Vitals reviewed.          Assessment/Plan   April was seen today for follow-up.    Diagnoses and all orders for this visit:    SUGAR (obstructive sleep apnea)  -     BIPAP / CPAP Adjustment    Other orders  -     pramipexole (MIRAPEX) 1 MG tablet; Take 1 tablet by mouth Every Night.         " "  Return in about 3 months (around 7/13/2017) for Recheck, For Dr. Gonzalez.    DISCUSSION (if any):  She is compliant with CPAP use. When I review her compliance report she is using a pressure of 13-16 most nights and with continued sleepiness I feel the pressure should be increased. I will increased the pressure to 12/20.  We discussed trying a nasal mask because the full face mask makes her face \"sore\" but the increased pressure will likely be too much for her to use a nasal mask.     I will start Mirapex at night to see if she can tolerate this medication and if it helps decrease her leg movements.     She may benefit from an iron profile in the future.     Errors in dictation may reflect use of voice recognition software and not all errors in transcription may have been detected prior to signing    This document was electronically signed by AVA Posada April 13, 2017  3:59 PM   "

## 2017-04-20 DIAGNOSIS — G43.109 MIGRAINE WITH AURA AND WITHOUT STATUS MIGRAINOSUS, NOT INTRACTABLE: ICD-10-CM

## 2017-04-23 DIAGNOSIS — M62.830 MUSCLE SPASM OF BACK: ICD-10-CM

## 2017-04-23 DIAGNOSIS — M53.3 SACROILIAC JOINT DYSFUNCTION OF RIGHT SIDE: ICD-10-CM

## 2017-04-23 DIAGNOSIS — M22.2X2 PATELLOFEMORAL STRESS SYNDROME OF LEFT KNEE: ICD-10-CM

## 2017-04-24 RX ORDER — DICLOFENAC SODIUM 75 MG/1
TABLET, DELAYED RELEASE ORAL
Qty: 60 TABLET | Refills: 5 | Status: SHIPPED | OUTPATIENT
Start: 2017-04-24 | End: 2018-06-27 | Stop reason: SDUPTHER

## 2017-04-25 ENCOUNTER — OFFICE VISIT (OUTPATIENT)
Dept: INTERNAL MEDICINE | Facility: CLINIC | Age: 42
End: 2017-04-25

## 2017-04-25 VITALS
BODY MASS INDEX: 32.99 KG/M2 | DIASTOLIC BLOOD PRESSURE: 80 MMHG | OXYGEN SATURATION: 98 % | HEART RATE: 90 BPM | HEIGHT: 65 IN | TEMPERATURE: 98 F | RESPIRATION RATE: 12 BRPM | WEIGHT: 198 LBS | SYSTOLIC BLOOD PRESSURE: 124 MMHG

## 2017-04-25 DIAGNOSIS — G43.109 MIGRAINE WITH AURA AND WITHOUT STATUS MIGRAINOSUS, NOT INTRACTABLE: ICD-10-CM

## 2017-04-25 PROCEDURE — 99213 OFFICE O/P EST LOW 20 MIN: CPT | Performed by: FAMILY MEDICINE

## 2017-04-25 PROCEDURE — 96372 THER/PROPH/DIAG INJ SC/IM: CPT | Performed by: FAMILY MEDICINE

## 2017-04-25 RX ORDER — KETOROLAC TROMETHAMINE 30 MG/ML
30 INJECTION, SOLUTION INTRAMUSCULAR; INTRAVENOUS ONCE
Status: COMPLETED | OUTPATIENT
Start: 2017-04-25 | End: 2017-04-25

## 2017-04-25 RX ORDER — TRAZODONE HYDROCHLORIDE 50 MG/1
TABLET ORAL
Qty: 60 TABLET | Refills: 2 | Status: SHIPPED | OUTPATIENT
Start: 2017-04-25 | End: 2017-10-30 | Stop reason: SDUPTHER

## 2017-04-25 RX ORDER — PROMETHAZINE HYDROCHLORIDE 50 MG/ML
50 INJECTION, SOLUTION INTRAMUSCULAR; INTRAVENOUS ONCE
Status: DISCONTINUED | OUTPATIENT
Start: 2017-04-25 | End: 2017-04-25

## 2017-04-25 RX ORDER — PROMETHAZINE HYDROCHLORIDE 25 MG/ML
25 INJECTION, SOLUTION INTRAMUSCULAR; INTRAVENOUS ONCE
Status: COMPLETED | OUTPATIENT
Start: 2017-04-25 | End: 2017-04-25

## 2017-04-25 RX ORDER — VERAPAMIL HYDROCHLORIDE 240 MG/1
240 TABLET, FILM COATED, EXTENDED RELEASE ORAL NIGHTLY
Qty: 30 TABLET | Refills: 2 | Status: SHIPPED | OUTPATIENT
Start: 2017-04-25 | End: 2017-05-25

## 2017-04-25 RX ADMIN — KETOROLAC TROMETHAMINE 30 MG: 30 INJECTION, SOLUTION INTRAMUSCULAR; INTRAVENOUS at 16:01

## 2017-04-25 RX ADMIN — PROMETHAZINE HYDROCHLORIDE 25 MG: 25 INJECTION, SOLUTION INTRAMUSCULAR; INTRAVENOUS at 16:03

## 2017-04-27 DIAGNOSIS — M22.2X2 PATELLOFEMORAL STRESS SYNDROME OF LEFT KNEE: ICD-10-CM

## 2017-04-27 DIAGNOSIS — M62.830 MUSCLE SPASM OF BACK: ICD-10-CM

## 2017-04-27 DIAGNOSIS — M53.3 SACROILIAC JOINT DYSFUNCTION OF RIGHT SIDE: ICD-10-CM

## 2017-04-27 RX ORDER — DICLOFENAC SODIUM 75 MG/1
TABLET, DELAYED RELEASE ORAL
Qty: 60 TABLET | Refills: 2 | Status: SHIPPED | OUTPATIENT
Start: 2017-04-27 | End: 2017-05-25 | Stop reason: SDUPTHER

## 2017-04-28 DIAGNOSIS — G47.33 OSA (OBSTRUCTIVE SLEEP APNEA): Primary | ICD-10-CM

## 2017-05-25 ENCOUNTER — OFFICE VISIT (OUTPATIENT)
Dept: INTERNAL MEDICINE | Facility: CLINIC | Age: 42
End: 2017-05-25

## 2017-05-25 VITALS
BODY MASS INDEX: 32.99 KG/M2 | RESPIRATION RATE: 12 BRPM | OXYGEN SATURATION: 98 % | HEART RATE: 92 BPM | WEIGHT: 198 LBS | DIASTOLIC BLOOD PRESSURE: 80 MMHG | HEIGHT: 65 IN | SYSTOLIC BLOOD PRESSURE: 122 MMHG

## 2017-05-25 DIAGNOSIS — Z00.00 HEALTHCARE MAINTENANCE: ICD-10-CM

## 2017-05-25 DIAGNOSIS — R60.0 BILATERAL LOWER EXTREMITY EDEMA: ICD-10-CM

## 2017-05-25 DIAGNOSIS — I10 ESSENTIAL HYPERTENSION: ICD-10-CM

## 2017-05-25 DIAGNOSIS — G43.109 MIGRAINE WITH AURA AND WITHOUT STATUS MIGRAINOSUS, NOT INTRACTABLE: Primary | ICD-10-CM

## 2017-05-25 DIAGNOSIS — F33.1 MODERATE EPISODE OF RECURRENT MAJOR DEPRESSIVE DISORDER (HCC): ICD-10-CM

## 2017-05-25 DIAGNOSIS — G56.02 CARPAL TUNNEL SYNDROME OF LEFT WRIST: ICD-10-CM

## 2017-05-25 PROCEDURE — 99214 OFFICE O/P EST MOD 30 MIN: CPT | Performed by: FAMILY MEDICINE

## 2017-05-25 RX ORDER — PROMETHAZINE HYDROCHLORIDE 25 MG/1
25 TABLET ORAL DAILY PRN
Qty: 30 TABLET | Refills: 1 | Status: SHIPPED | OUTPATIENT
Start: 2017-05-25 | End: 2020-09-16

## 2017-05-25 RX ORDER — ESCITALOPRAM OXALATE 10 MG/1
10 TABLET ORAL DAILY
Qty: 30 TABLET | Refills: 2 | Status: SHIPPED | OUTPATIENT
Start: 2017-05-25 | End: 2017-06-27 | Stop reason: SDUPTHER

## 2017-05-25 RX ORDER — VENLAFAXINE HYDROCHLORIDE 75 MG/1
75 CAPSULE, EXTENDED RELEASE ORAL DAILY
Qty: 30 CAPSULE | Refills: 2 | Status: SHIPPED | OUTPATIENT
Start: 2017-05-25 | End: 2017-06-27 | Stop reason: SDUPTHER

## 2017-06-01 LAB
25(OH)D3+25(OH)D2 SERPL-MCNC: 43 NG/ML
ALBUMIN SERPL-MCNC: 3.8 G/DL (ref 3.5–5)
ALBUMIN/GLOB SERPL: 1.5 G/DL (ref 1–2)
ALP SERPL-CCNC: 93 U/L (ref 38–126)
ALT SERPL-CCNC: 34 U/L (ref 13–69)
AST SERPL-CCNC: 21 U/L (ref 15–46)
BILIRUB SERPL-MCNC: 0.3 MG/DL (ref 0.2–1.3)
BUN SERPL-MCNC: 22 MG/DL (ref 7–20)
BUN/CREAT SERPL: 27.5 (ref 7.1–23.5)
CALCIUM SERPL-MCNC: 9.3 MG/DL (ref 8.4–10.2)
CHLORIDE SERPL-SCNC: 104 MMOL/L (ref 98–107)
CO2 SERPL-SCNC: 27 MMOL/L (ref 26–30)
CREAT SERPL-MCNC: 0.8 MG/DL (ref 0.6–1.3)
CRP SERPL-MCNC: 1.2 MG/DL (ref 0–1)
ERYTHROCYTE [DISTWIDTH] IN BLOOD BY AUTOMATED COUNT: 12.3 % (ref 11.5–14.5)
ERYTHROCYTE [SEDIMENTATION RATE] IN BLOOD BY WESTERGREN METHOD: 21 MM/HR (ref 0–20)
FERRITIN SERPL-MCNC: 50.7 NG/ML (ref 6.24–137)
GLOBULIN SER CALC-MCNC: 2.5 GM/DL
GLUCOSE SERPL-MCNC: 107 MG/DL (ref 74–98)
HBA1C MFR BLD: 5.4 %
HCT VFR BLD AUTO: 38 % (ref 37–47)
HGB BLD-MCNC: 12.4 G/DL (ref 12–16)
MCH RBC QN AUTO: 30.7 PG (ref 27–31)
MCHC RBC AUTO-ENTMCNC: 32.6 G/DL (ref 30–37)
MCV RBC AUTO: 94.1 FL (ref 81–99)
PLATELET # BLD AUTO: 221 10*3/MM3 (ref 130–400)
POTASSIUM SERPL-SCNC: 4.5 MMOL/L (ref 3.5–5.1)
PROT SERPL-MCNC: 6.3 G/DL (ref 6.3–8.2)
RBC # BLD AUTO: 4.04 10*6/MM3 (ref 4.2–5.4)
SODIUM SERPL-SCNC: 140 MMOL/L (ref 137–145)
T4 SERPL-MCNC: 5 UG/DL (ref 4.5–12)
TSH SERPL DL<=0.005 MIU/L-ACNC: 0.9 MIU/ML (ref 0.47–4.68)
VIT B12 SERPL-MCNC: 996 PG/ML (ref 239–931)
WBC # BLD AUTO: 8.86 10*3/MM3 (ref 4.8–10.8)

## 2017-06-27 ENCOUNTER — OFFICE VISIT (OUTPATIENT)
Dept: INTERNAL MEDICINE | Facility: CLINIC | Age: 42
End: 2017-06-27

## 2017-06-27 VITALS
SYSTOLIC BLOOD PRESSURE: 132 MMHG | OXYGEN SATURATION: 97 % | RESPIRATION RATE: 14 BRPM | BODY MASS INDEX: 32.55 KG/M2 | DIASTOLIC BLOOD PRESSURE: 80 MMHG | HEIGHT: 65 IN | WEIGHT: 195.38 LBS | TEMPERATURE: 98.5 F | HEART RATE: 98 BPM

## 2017-06-27 DIAGNOSIS — G25.81 RESTLESS LEG SYNDROME: ICD-10-CM

## 2017-06-27 DIAGNOSIS — Z79.890 SURGICAL MENOPAUSE ON HORMONE REPLACEMENT THERAPY: ICD-10-CM

## 2017-06-27 DIAGNOSIS — F33.1 MODERATE EPISODE OF RECURRENT MAJOR DEPRESSIVE DISORDER (HCC): Primary | ICD-10-CM

## 2017-06-27 DIAGNOSIS — G56.21 ULNAR NEUROPATHY AT ELBOW, RIGHT: ICD-10-CM

## 2017-06-27 DIAGNOSIS — E89.40 SURGICAL MENOPAUSE ON HORMONE REPLACEMENT THERAPY: ICD-10-CM

## 2017-06-27 DIAGNOSIS — G56.02 CARPAL TUNNEL SYNDROME OF LEFT WRIST: ICD-10-CM

## 2017-06-27 DIAGNOSIS — M79.7 FIBROMYALGIA: ICD-10-CM

## 2017-06-27 PROCEDURE — 99214 OFFICE O/P EST MOD 30 MIN: CPT | Performed by: FAMILY MEDICINE

## 2017-06-27 RX ORDER — ESTRADIOL 2 MG/1
TABLET ORAL
Qty: 30 TABLET | Refills: 2 | Status: SHIPPED | OUTPATIENT
Start: 2017-06-27 | End: 2017-09-26 | Stop reason: SDUPTHER

## 2017-06-27 RX ORDER — ESCITALOPRAM OXALATE 20 MG/1
20 TABLET ORAL DAILY
Qty: 30 TABLET | Refills: 2 | Status: SHIPPED | OUTPATIENT
Start: 2017-06-27 | End: 2017-07-27

## 2017-06-27 RX ORDER — GABAPENTIN 300 MG/1
300 CAPSULE ORAL 3 TIMES DAILY
Qty: 90 CAPSULE | Refills: 2 | Status: SHIPPED | OUTPATIENT
Start: 2017-06-27 | End: 2017-10-19

## 2017-06-27 RX ORDER — VENLAFAXINE HYDROCHLORIDE 37.5 MG/1
37.5 CAPSULE, EXTENDED RELEASE ORAL DAILY
Qty: 30 CAPSULE | Refills: 0 | Status: SHIPPED | OUTPATIENT
Start: 2017-06-27 | End: 2017-07-27

## 2017-06-27 RX ORDER — PRAMIPEXOLE DIHYDROCHLORIDE 1.5 MG/1
1.5 TABLET ORAL NIGHTLY
Qty: 30 TABLET | Refills: 2 | Status: SHIPPED | OUTPATIENT
Start: 2017-06-27 | End: 2017-09-26 | Stop reason: SDUPTHER

## 2017-07-27 ENCOUNTER — OFFICE VISIT (OUTPATIENT)
Dept: INTERNAL MEDICINE | Facility: CLINIC | Age: 42
End: 2017-07-27

## 2017-07-27 VITALS
DIASTOLIC BLOOD PRESSURE: 70 MMHG | OXYGEN SATURATION: 98 % | SYSTOLIC BLOOD PRESSURE: 122 MMHG | RESPIRATION RATE: 12 BRPM | HEART RATE: 97 BPM | HEIGHT: 65 IN | BODY MASS INDEX: 32.36 KG/M2 | WEIGHT: 194.2 LBS

## 2017-07-27 DIAGNOSIS — F33.1 MODERATE EPISODE OF RECURRENT MAJOR DEPRESSIVE DISORDER (HCC): ICD-10-CM

## 2017-07-27 DIAGNOSIS — E03.8 SUBCLINICAL HYPOTHYROIDISM: ICD-10-CM

## 2017-07-27 DIAGNOSIS — G56.03 CARPAL TUNNEL SYNDROME ON BOTH SIDES: Primary | ICD-10-CM

## 2017-07-27 PROCEDURE — 99214 OFFICE O/P EST MOD 30 MIN: CPT | Performed by: FAMILY MEDICINE

## 2017-07-27 RX ORDER — SERTRALINE HYDROCHLORIDE 100 MG/1
100 TABLET, FILM COATED ORAL DAILY
Qty: 30 TABLET | Refills: 1 | Status: SHIPPED | OUTPATIENT
Start: 2017-07-27 | End: 2017-09-21 | Stop reason: SDUPTHER

## 2017-07-27 NOTE — PROGRESS NOTES
Follow up visit, med refill Venlafaxine. She can't really tell a difference since starting Escitalopram and decreasing Venlafaxine to 37.5 mg qd.   C/O diarrhea x 1 week.   Carpal tunnel is worse, both hands. Right wrist has really started hurting. Wearing braces at work now, and at night.  She has applied for worker's comp and they said they needed a more detailed note. She has FMLA forms today she needs filled out.   C/O bulge in mid-abd she can feel if she presses on it. She noticed it about a week ago.   She had a migraine Fri and Sat. Sat she started getting chest pains. She took her BP and it 154/101. She took an aspirin and started feeling better.     SUBJECTIVE: April Judy Nicolas is a 42 y.o. female seen for a follow up visit;    Diarrhea  Patient complains of diarrhea. Onset of diarrhea was 1 week ago. Diarrhea is occurring approximately 4 times per day. Patient describes diarrhea as semisolid and loose, green/light brown in color. Diarrhea has been associated with nothing. Patient denies blood in stool, fever, illness in household contacts, recent antibiotic use, recent camping, significant abdominal pain, unintentional weight loss. Previous visits for diarrhea: none. Evaluation to date: none. Treatment to date: nothing.     Abdominal bulge: noticed it a week ago while she was mildly straining with a bowel  Movement, looked down and noticed a little bulge from top of belly button to top of sternum.  Not tender, only sticks out if she tightens her abdominal muscles.     CTS: worsening, now R>L, wearing braces every night and controlling symptoms at night.  Not waking up w/ symptoms in the mornings.  She now has symptoms during the day at work however and has worsened there.  She cannot work now at the job she has - symptoms only started in May at first she would wake up with it in the morning.  At work, while she is doing  duties her she could go about 20-30 minutes before symptoms started, but  triggered by any lifting of any items at the  [place].  Symptoms are controlled at work as long as she is wearing the braces, unless she has to lift anything over 10 pounds like a gallon of milk or has to lift or back a lot of things that require gripping.  Once symptoms start it won't ease up for hours.  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     HTN: had an episode of dizziness and palpitations w/ chest tightness 5 days ago.  She checked her BP and it was 154/101/.  Normally her BP is well controlled at home.  She thinks her pulse was around 100.  She had a migraine on Friday, took excedrin migraine - 2 tabs (250 mg acetaminophen, 250 mg asa, 65 mg caffeine) on Friday, then on Saturday took 1 excedrin in the morning, then a second dose 4 hours later.  The chest pain/dizziness occurred about 5 hours after that, her migraine had improved to a headache.  She took an 81 mg asa which helped.  Had taken her BP med that morning, hadn't taken the lasix in several days.      Depression: unchanged, but no side effects.      The following portions of the patient's history were reviewed and updated as appropriate: She  has a past medical history of Malignant melanoma.  She has Moderate episode of recurrent major depressive disorder; Migraine with aura and without status migrainosus, not intractable; Anxiety; and Surgical menopause on hormone replacement therapy on her pertinent problem list.  She  has a past surgical history that includes Skin cancer excision and Total abdominal hysterectomy w/ bilateral salpingoophorectomy (2004).  Her family history includes Alzheimer's disease in her maternal grandfather; COPD in her father; Crohn's disease in her sister; Kidney disease in her maternal grandmother; No Known Problems in her maternal aunt, maternal uncle, paternal aunt, paternal grandfather, paternal grandmother, and paternal uncle; Rheum arthritis in her mother; Ulcerative colitis in  "her mother. There is no history of Breast cancer or Ovarian cancer.  She  reports that she quit smoking about 17 months ago. Her smoking use included Cigarettes. She has a 8.00 pack-year smoking history. She has never used smokeless tobacco. She reports that she does not drink alcohol or use illicit drugs..    Review of Systems   Constitutional: Positive for fatigue.   Musculoskeletal: Positive for arthralgias and joint swelling.   Neurological: Positive for weakness and numbness.   Psychiatric/Behavioral: Positive for sleep disturbance.         OBJECTIVE:  /70  Pulse 97  Resp 12  Ht 65\" (165.1 cm)  Wt 194 lb 3.2 oz (88.1 kg)  SpO2 98%  BMI 32.32 kg/m2     Physical Exam   Constitutional: She appears well-developed and well-nourished. No distress.   Musculoskeletal:        Right wrist: She exhibits tenderness and swelling. She exhibits no effusion.   Neurological:   + tinnel test B/L, + phalen, right  weakness - 4/5 during carpal tunnel flare, normal when asymptomatic.  Right thumb opposition 3/5 during carpal tunnel flare, 5/5 when asymptomatic           ASSESSMENT:  1. Carpal tunnel syndrome on both sides  Worsening, will definitely need hand surgery referral    2. Moderate episode of recurrent major depressive disorder  Stable, but not improving  Medications Discontinued During This Encounter   Medication Reason   • venlafaxine XR (EFFEXOR-XR) 37.5 MG 24 hr capsule    • escitalopram (LEXAPRO) 20 MG tablet        - sertraline (ZOLOFT) 100 MG tablet; Take 1 tablet by mouth Daily.  Dispense: 30 tablet; Refill: 1    3. Subclinical hypothyroidism  ? Contribution to fatigue and migraines  - Thyroid Peroxidase Antibody  - Anti-Thyroglobulin Antibody  - T3, Free                  "

## 2017-07-28 LAB
T3FREE SERPL-MCNC: 3.4 PG/ML (ref 2–4.4)
THYROGLOB AB SERPL-ACNC: <1 IU/ML (ref 0–0.9)
THYROPEROXIDASE AB SERPL-ACNC: 13 IU/ML (ref 0–34)

## 2017-07-31 ENCOUNTER — APPOINTMENT (OUTPATIENT)
Dept: GENERAL RADIOLOGY | Facility: HOSPITAL | Age: 42
End: 2017-07-31

## 2017-07-31 ENCOUNTER — HOSPITAL ENCOUNTER (EMERGENCY)
Facility: HOSPITAL | Age: 42
Discharge: HOME | End: 2017-08-01
Attending: EMERGENCY MEDICINE | Admitting: EMERGENCY MEDICINE

## 2017-07-31 ENCOUNTER — APPOINTMENT (OUTPATIENT)
Dept: CT IMAGING | Facility: HOSPITAL | Age: 42
End: 2017-07-31

## 2017-07-31 DIAGNOSIS — R07.9 CHEST PAIN, UNSPECIFIED: Primary | ICD-10-CM

## 2017-07-31 LAB
ALBUMIN SERPL-MCNC: 4.4 G/DL (ref 3.5–5)
ALBUMIN/GLOB SERPL: 1.5 G/DL (ref 1–2)
ALP SERPL-CCNC: 107 U/L (ref 38–126)
ALT SERPL W P-5'-P-CCNC: 37 U/L (ref 13–69)
ANION GAP SERPL CALCULATED.3IONS-SCNC: 14.8 MMOL/L
AST SERPL-CCNC: 24 U/L (ref 15–46)
BASOPHILS # BLD AUTO: 0.05 10*3/MM3 (ref 0–0.2)
BASOPHILS NFR BLD AUTO: 0.4 % (ref 0–2.5)
BILIRUB SERPL-MCNC: 0.3 MG/DL (ref 0.2–1.3)
BUN BLD-MCNC: 21 MG/DL (ref 7–20)
BUN/CREAT SERPL: 26.3 (ref 7.1–23.5)
CALCIUM SPEC-SCNC: 9.9 MG/DL (ref 8.4–10.2)
CHLORIDE SERPL-SCNC: 105 MMOL/L (ref 98–107)
CO2 SERPL-SCNC: 25 MMOL/L (ref 26–30)
CREAT BLD-MCNC: 0.8 MG/DL (ref 0.6–1.3)
D-DIMER, QUANTITATIVE (MAD,POW, STR): 564 NG/ML (FEU) (ref 0–500)
DEPRECATED RDW RBC AUTO: 41 FL (ref 37–54)
EOSINOPHIL # BLD AUTO: 0.61 10*3/MM3 (ref 0–0.7)
EOSINOPHIL NFR BLD AUTO: 5.2 % (ref 0–7)
ERYTHROCYTE [DISTWIDTH] IN BLOOD BY AUTOMATED COUNT: 12.5 % (ref 11.5–14.5)
GFR SERPL CREATININE-BSD FRML MDRD: 79 ML/MIN/1.73
GLOBULIN UR ELPH-MCNC: 3 GM/DL
GLUCOSE BLD-MCNC: 113 MG/DL (ref 74–98)
HCT VFR BLD AUTO: 41.8 % (ref 37–47)
HGB BLD-MCNC: 14.1 G/DL (ref 12–16)
HOLD SPECIMEN: NORMAL
IMM GRANULOCYTES # BLD: 0.08 10*3/MM3 (ref 0–0.06)
IMM GRANULOCYTES NFR BLD: 0.7 % (ref 0–0.6)
LYMPHOCYTES # BLD AUTO: 2.67 10*3/MM3 (ref 0.6–3.4)
LYMPHOCYTES NFR BLD AUTO: 22.6 % (ref 10–50)
MCH RBC QN AUTO: 30.4 PG (ref 27–31)
MCHC RBC AUTO-ENTMCNC: 33.7 G/DL (ref 30–37)
MCV RBC AUTO: 90.1 FL (ref 81–99)
MONOCYTES # BLD AUTO: 0.45 10*3/MM3 (ref 0–0.9)
MONOCYTES NFR BLD AUTO: 3.8 % (ref 0–12)
NEUTROPHILS # BLD AUTO: 7.97 10*3/MM3 (ref 2–6.9)
NEUTROPHILS NFR BLD AUTO: 67.3 % (ref 37–80)
NRBC BLD MANUAL-RTO: 0 /100 WBC (ref 0–0)
PLATELET # BLD AUTO: 250 10*3/MM3 (ref 130–400)
PMV BLD AUTO: 10.1 FL (ref 6–12)
POTASSIUM BLD-SCNC: 3.8 MMOL/L (ref 3.5–5.1)
PROT SERPL-MCNC: 7.4 G/DL (ref 6.3–8.2)
RBC # BLD AUTO: 4.64 10*6/MM3 (ref 4.2–5.4)
SODIUM BLD-SCNC: 141 MMOL/L (ref 137–145)
TROPONIN I SERPL-MCNC: 0 NG/ML (ref 0–0.05)
TROPONIN I SERPL-MCNC: <0.012 NG/ML (ref 0–0.03)
WBC NRBC COR # BLD: 11.83 10*3/MM3 (ref 4.8–10.8)
WHOLE BLOOD HOLD SPECIMEN: NORMAL
WHOLE BLOOD HOLD SPECIMEN: NORMAL

## 2017-07-31 PROCEDURE — 71010 HC CHEST PA OR AP: CPT

## 2017-07-31 PROCEDURE — 25010000002 HYDROMORPHONE PER 4 MG: Performed by: EMERGENCY MEDICINE

## 2017-07-31 PROCEDURE — 93005 ELECTROCARDIOGRAM TRACING: CPT

## 2017-07-31 PROCEDURE — 96376 TX/PRO/DX INJ SAME DRUG ADON: CPT

## 2017-07-31 PROCEDURE — 71275 CT ANGIOGRAPHY CHEST: CPT

## 2017-07-31 PROCEDURE — 81025 URINE PREGNANCY TEST: CPT | Performed by: EMERGENCY MEDICINE

## 2017-07-31 PROCEDURE — 85379 FIBRIN DEGRADATION QUANT: CPT | Performed by: EMERGENCY MEDICINE

## 2017-07-31 PROCEDURE — 84484 ASSAY OF TROPONIN QUANT: CPT | Performed by: EMERGENCY MEDICINE

## 2017-07-31 PROCEDURE — 80053 COMPREHEN METABOLIC PANEL: CPT

## 2017-07-31 PROCEDURE — 96374 THER/PROPH/DIAG INJ IV PUSH: CPT

## 2017-07-31 PROCEDURE — 84484 ASSAY OF TROPONIN QUANT: CPT

## 2017-07-31 PROCEDURE — 99284 EMERGENCY DEPT VISIT MOD MDM: CPT

## 2017-07-31 PROCEDURE — 85025 COMPLETE CBC W/AUTO DIFF WBC: CPT

## 2017-07-31 RX ORDER — MORPHINE SULFATE 4 MG/ML
4 INJECTION, SOLUTION INTRAMUSCULAR; INTRAVENOUS ONCE
Status: DISCONTINUED | OUTPATIENT
Start: 2017-07-31 | End: 2017-08-01 | Stop reason: HOSPADM

## 2017-07-31 RX ORDER — ASPIRIN 81 MG/1
243 TABLET, CHEWABLE ORAL ONCE
Status: COMPLETED | OUTPATIENT
Start: 2017-07-31 | End: 2017-07-31

## 2017-07-31 RX ORDER — SODIUM CHLORIDE 0.9 % (FLUSH) 0.9 %
10 SYRINGE (ML) INJECTION AS NEEDED
Status: DISCONTINUED | OUTPATIENT
Start: 2017-07-31 | End: 2017-08-01 | Stop reason: HOSPADM

## 2017-07-31 RX ORDER — NITROGLYCERIN 0.4 MG/1
0.4 TABLET SUBLINGUAL
Status: DISCONTINUED | OUTPATIENT
Start: 2017-07-31 | End: 2017-08-01 | Stop reason: HOSPADM

## 2017-07-31 RX ORDER — ASPIRIN 325 MG
325 TABLET ORAL ONCE
Status: DISCONTINUED | OUTPATIENT
Start: 2017-07-31 | End: 2017-08-01 | Stop reason: HOSPADM

## 2017-07-31 RX ADMIN — HYDROMORPHONE HYDROCHLORIDE 0.5 MG: 1 INJECTION, SOLUTION INTRAMUSCULAR; INTRAVENOUS; SUBCUTANEOUS at 22:46

## 2017-07-31 RX ADMIN — NITROGLYCERIN 0.4 MG: 0.4 TABLET SUBLINGUAL at 22:16

## 2017-07-31 RX ADMIN — ASPIRIN 81 MG 243 MG: 81 TABLET ORAL at 22:15

## 2017-07-31 RX ADMIN — HYDROMORPHONE HYDROCHLORIDE 0.5 MG: 1 INJECTION, SOLUTION INTRAMUSCULAR; INTRAVENOUS; SUBCUTANEOUS at 23:54

## 2017-08-01 VITALS
TEMPERATURE: 97.8 F | HEIGHT: 65 IN | DIASTOLIC BLOOD PRESSURE: 87 MMHG | WEIGHT: 194 LBS | OXYGEN SATURATION: 100 % | BODY MASS INDEX: 32.32 KG/M2 | HEART RATE: 68 BPM | SYSTOLIC BLOOD PRESSURE: 122 MMHG | RESPIRATION RATE: 16 BRPM

## 2017-08-01 LAB
B-HCG UR QL: NEGATIVE
TROPONIN I SERPL-MCNC: <0.012 NG/ML (ref 0–0.03)

## 2017-08-01 PROCEDURE — 0 IOPAMIDOL 61 % SOLUTION: Performed by: EMERGENCY MEDICINE

## 2017-08-01 RX ADMIN — IOPAMIDOL 90 ML: 612 INJECTION, SOLUTION INTRAVENOUS at 00:23

## 2017-08-01 NOTE — ED PROVIDER NOTES
Subjective   History of Present Illness   42-year-old female with a history of depression and anxiety, prior malignant melanoma on the left leg status post excision presenting with chest pain.  Describes central, dull, constant chest pain since 6 PM with difficulty taking a deep breath.  Denies any other associated symptoms. Takes estradiol. Otherwise, denies any history of prior DVT or PE, recent surgery or trauma to legs, hemoptysis, leg swelling.    Review of Systems   Cardiovascular: Positive for chest pain.   All other systems reviewed and are negative.      Past Medical History:   Diagnosis Date   • Malignant melanoma     stage 1, left leg       Allergies   Allergen Reactions   • Penicillins    • Tetracyclines & Related        Past Surgical History:   Procedure Laterality Date   • SKIN CANCER EXCISION     • TOTAL ABDOMINAL HYSTERECTOMY WITH SALPINGO OOPHORECTOMY  2004    ? Hormonal migraines       Family History   Problem Relation Age of Onset   • Rheum arthritis Mother    • Ulcerative colitis Mother    • COPD Father    • Crohn's disease Sister    • No Known Problems Maternal Aunt    • No Known Problems Maternal Uncle    • No Known Problems Paternal Aunt    • No Known Problems Paternal Uncle    • Kidney disease Maternal Grandmother    • Alzheimer's disease Maternal Grandfather    • No Known Problems Paternal Grandmother    • No Known Problems Paternal Grandfather    • Breast cancer Neg Hx    • Ovarian cancer Neg Hx        Social History     Social History   • Marital status: Single     Spouse name: N/A   • Number of children: N/A   • Years of education: N/A     Social History Main Topics   • Smoking status: Former Smoker     Packs/day: 1.00     Years: 8.00     Types: Cigarettes     Quit date: 2/1/2016   • Smokeless tobacco: Never Used   • Alcohol use No   • Drug use: No   • Sexual activity: Yes     Partners: Male     Other Topics Concern   • None     Social History Narrative           Objective   Physical Exam    Constitutional: She is oriented to person, place, and time. She appears well-nourished. No distress.   HENT:   Head: Normocephalic.   Mouth/Throat: Oropharynx is clear and moist. No oropharyngeal exudate.   Eyes: Conjunctivae are normal. No scleral icterus.   Neck: Neck supple. No tracheal deviation present.   Cardiovascular: Normal rate, regular rhythm, normal heart sounds and intact distal pulses.  Exam reveals no gallop and no friction rub.    No murmur heard.  Pulmonary/Chest: Effort normal and breath sounds normal. No stridor. No respiratory distress. She has no wheezes. She has no rales. She exhibits no tenderness.   Abdominal: Soft. She exhibits no distension and no mass. There is no tenderness. There is no rebound and no guarding. No hernia.   Musculoskeletal: She exhibits edema (non-pitting edema RLE - baseline). She exhibits no deformity.   Neurological: She is alert and oriented to person, place, and time.   Skin: Skin is warm and dry. She is not diaphoretic. No erythema. No pallor.   Psychiatric: She has a normal mood and affect. Her behavior is normal.   Nursing note and vitals reviewed.      Procedures         ED Course  ED Course            HEART Score  History: Slightly suspicious (+0)  ECG: Normal (+0)  Age: Less than 45 (+0)  Risk Factors: No risk factors known (+0)  Troponin: Normal limit or lower (+0)  Total: 0         MDM  42-year-old female here with chest pain.  Afebrile, vital signs stable. In terms of patients chest pain, considered possible ACS, PE, dissection, pntx, tamponade, PNA, esophageal etiologies. Will get labs, cxr, ekg and reassess.     EKG: NSR w/ nl intervals and no toshia/std. Does have Q wave in lead III.   CXR: (my read) No acute findings on my read      1:20 AM labs including 2 sets of troponin are unremarkable.  CT scan of the chest shows no evidence of PE.  Small gallstones in the gallbladder incidentally.  We will discharge home with regulation to follow-up with   breeding for outpatient stress test. Discussed strict return to care precautions.       Final diagnoses:   Chest pain, unspecified            Garret Mcgraw MD  08/01/17 0122

## 2017-08-01 NOTE — ED NOTES
Pt refused further ntg due to causing migraine.  advised.      Dawna Castellanos, CHARITY  07/31/17 [id number]

## 2017-08-03 ENCOUNTER — TELEPHONE (OUTPATIENT)
Dept: INTERNAL MEDICINE | Facility: CLINIC | Age: 42
End: 2017-08-03

## 2017-08-03 RX ORDER — TRIAMTERENE AND HYDROCHLOROTHIAZIDE 37.5; 25 MG/1; MG/1
1 TABLET ORAL DAILY
Qty: 30 TABLET | Refills: 5 | Status: SHIPPED | OUTPATIENT
Start: 2017-08-03

## 2017-08-03 NOTE — TELEPHONE ENCOUNTER
Refill sent in. This was one of the faxed I gave you. Just wanted to make sure this fax # was on there. Thank you!

## 2017-08-03 NOTE — TELEPHONE ENCOUNTER
Patient needs a refill.    She also wanted to make sure her FMLA forms were faxed over to [employer].    Fax for [employer]:  [phone]

## 2017-08-04 ENCOUNTER — CONSULT (OUTPATIENT)
Dept: CARDIOLOGY | Facility: CLINIC | Age: 42
End: 2017-08-04

## 2017-08-04 VITALS
OXYGEN SATURATION: 98 % | HEART RATE: 102 BPM | WEIGHT: 192.4 LBS | HEIGHT: 65 IN | BODY MASS INDEX: 32.06 KG/M2 | DIASTOLIC BLOOD PRESSURE: 70 MMHG | SYSTOLIC BLOOD PRESSURE: 122 MMHG | RESPIRATION RATE: 16 BRPM

## 2017-08-04 DIAGNOSIS — R00.2 PALPITATIONS: ICD-10-CM

## 2017-08-04 DIAGNOSIS — R07.2 PRECORDIAL PAIN: ICD-10-CM

## 2017-08-04 DIAGNOSIS — R06.02 SOB (SHORTNESS OF BREATH): ICD-10-CM

## 2017-08-04 DIAGNOSIS — I10 ESSENTIAL HYPERTENSION: Primary | ICD-10-CM

## 2017-08-04 PROCEDURE — 99204 OFFICE O/P NEW MOD 45 MIN: CPT | Performed by: INTERNAL MEDICINE

## 2017-08-04 NOTE — PROGRESS NOTES
Subjective:     Encounter Date:08/04/2017      Patient ID: April Judy Nicolas is a 42 y.o. female.    Chief Complaint:Chest pain, shortness of breath, palpitations and edema.  HPI  This is a 42-year-old female patient who has been experiencing chest discomfort for the last 4-5 weeks.  The patient indicates that she was seen in the emergency room 2 weeks ago.  At that time her 12-lead electrocardiogram showed no ischemic ST-T wave changes and her troponins were serially negative.  She indicates that she has had 4-5 episodes of chest discomfort since that emergency room evaluation.  Her chest discomfort will generally lasts for several hours per episode.  The discomfort is discretely located in the mid sternum and will occasionally radiate into her mid thoracic back.  The discomfort is described as having a pressure-like quality and is associated with shortness of breath.  It typically occurs at rest.  There does not appear to be an exertional component.  The discomfort seems to improve with use of aspirin.  She also reports having shortness of breath for the last 2 weeks.  She reports that the shortness of breath occurs continuously.  She cannot identify any precipitating aggravating or alleviating features to it in the chest pain or shortness of breath.  The chest pain typically has a 3-5/10 in intensity.  There is no associated orthopnea or PND but she does have some swelling in her feet and ankles primarily in her right leg.  The patient also reports having palpitations which she describes as a sense that her heart is both fluttering and pounding.  This is also been present for the last 2 weeks.  It typically occurs on a daily basis and generally lasts anywhere from a few seconds to several minutes.  She cannot identify any precipitating aggravating or alleviating features.  She has no associated dizziness or syncope.  She has a history of hypertension but no history of diabetes or hypercholesterolemia.   She is currently smoking one pack of cigarettes per day but desires to discontinue.  Her family history is negative for premature coronary disease.  The patient has no personal history of myocardial infarction or coronary revascularization.  There is no personal history of rheumatic fever, childhood murmurs, cardiomyopathy, congestive heart failure, documented valvular heart disease, or arrhythmia.  Patient indicates that she is unable to do treadmill stress testing due to her obesity, aerobic deconditioning, poor effort tolerance and severe arthritis affecting both knees.  Never had any form of her testing other than a 12-lead electrocardiogram.  The following portions of the patient's history were reviewed and updated as appropriate: allergies, current medications, past family history, past medical history, past social history, past surgical history and problem  Review of Systems   Constitution: Negative for chills, diaphoresis, fever, weakness, malaise/fatigue, night sweats, weight gain and weight loss.   HENT: Negative for ear discharge, hearing loss, hoarse voice and nosebleeds.    Eyes: Negative for discharge, double vision, pain and photophobia.   Cardiovascular: Positive for chest pain, dyspnea on exertion, irregular heartbeat, leg swelling and palpitations. Negative for claudication, cyanosis, near-syncope, orthopnea, paroxysmal nocturnal dyspnea and syncope.   Respiratory: Positive for shortness of breath. Negative for cough, hemoptysis, sputum production and wheezing.    Endocrine: Negative for cold intolerance, heat intolerance, polydipsia, polyphagia and polyuria.   Hematologic/Lymphatic: Negative for adenopathy and bleeding problem. Does not bruise/bleed easily.   Skin: Negative for color change, flushing, itching and rash.   Musculoskeletal: Negative for muscle cramps, muscle weakness, myalgias and stiffness.   Gastrointestinal: Negative for abdominal pain, diarrhea, hematemesis, hematochezia, nausea  and vomiting.   Genitourinary: Negative for dysuria, frequency and nocturia.   Neurological: Negative for dizziness, focal weakness, light-headedness, loss of balance, numbness, paresthesias and seizures.   Psychiatric/Behavioral: Negative for altered mental status, hallucinations and suicidal ideas.   Allergic/Immunologic: Negative for HIV exposure, hives and persistent infections.       Current Outpatient Prescriptions:   •  albuterol (PROVENTIL HFA;VENTOLIN HFA) 108 (90 BASE) MCG/ACT inhaler, Inhale 2 puffs Every 6 (Six) Hours As Needed for wheezing., Disp: 1 inhaler, Rfl: 11  •  cyclobenzaprine (FLEXERIL) 10 MG tablet, Take 1 tablet by mouth 2 (Two) Times a Day As Needed for Muscle Spasms., Disp: 30 tablet, Rfl: 1  •  diclofenac (VOLTAREN) 75 MG EC tablet, TAKE 1 TABLET BY MOUTH TWO TIMES A DAY AS NEEDED for back pain, Disp: 60 tablet, Rfl: 5  •  estradiol (ESTRACE) 2 MG tablet, Take 1/2 tab x 2 weeks, if still having hot flashes increase to full tab, Disp: 30 tablet, Rfl: 2  •  flunisolide (NASALIDE) 25 MCG/ACT (0.025%) solution nasal spray, Inhale 1 spray Every 12 (Twelve) Hours., Disp: 1 bottle, Rfl: 5  •  furosemide (LASIX) 20 MG tablet, Take 20 mg by mouth daily as needed., Disp: , Rfl:   •  gabapentin (NEURONTIN) 300 MG capsule, Take 1 capsule by mouth 3 (Three) Times a Day., Disp: 90 capsule, Rfl: 2  •  potassium chloride (MICRO-K) 10 MEQ CR capsule, Take 1 capsule by mouth Daily As Needed (take when taking Furosemide)., Disp: 30 capsule, Rfl: 5  •  pramipexole (MIRAPEX) 1.5 MG tablet, Take 1 tablet by mouth Every Night., Disp: 30 tablet, Rfl: 2  •  promethazine (PHENERGAN) 25 MG tablet, Take 1 tablet by mouth Daily As Needed (migraine)., Disp: 30 tablet, Rfl: 1  •  ramipril (ALTACE) 5 MG capsule, 5 mg daily., Disp: , Rfl: 2  •  sertraline (ZOLOFT) 100 MG tablet, Take 1 tablet by mouth Daily., Disp: 30 tablet, Rfl: 1  •  traZODone (DESYREL) 50 MG tablet, Take 1-2 tabs at night as needed, Disp: 60 tablet,  "Rfl: 2  •  triamterene-hydrochlorothiazide (MAXZIDE-25) 37.5-25 MG per tablet, Take 1 tablet by mouth Daily., Disp: 30 tablet, Rfl: 5     Objective:     Physical Exam   Constitutional: She is oriented to person, place, and time. She appears well-developed and well-nourished.   HENT:   Head: Normocephalic and atraumatic.   Eyes: Conjunctivae are normal. No scleral icterus.   Cardiovascular: Normal rate, regular rhythm, normal heart sounds and intact distal pulses.  Exam reveals no gallop and no friction rub.    No murmur heard.  Pulmonary/Chest: Effort normal and breath sounds normal. No respiratory distress.   Abdominal: Soft. Bowel sounds are normal. There is no tenderness.   Musculoskeletal: She exhibits no edema.   Neurological: She is alert and oriented to person, place, and time.   Skin: Skin is warm and dry.   Psychiatric: She has a normal mood and affect. Her behavior is normal.     Blood pressure 122/70, pulse 102, resp. rate 16, height 65\" (165.1 cm), weight 192 lb 6.4 oz (87.3 kg), SpO2 98 %.   Lab Review:       Assessment:         1. Essential hypertension  Excellent blood pressure control  - Stress Test With Myocardial Perfusion Two Day  - Adult Transthoracic Echo Complete    2. Precordial pain  Chest discomfort has features mostly atypical for coronary insufficiency.  The patient has several risk factors for atherosclerosis.  She has never had a stress test.  She indicates that she is unable to do treadmill stress testing due to aerobic deconditioning, obesity and arthritis affecting both knees.  - Stress Test With Myocardial Perfusion Two Day  - Adult Transthoracic Echo Complete    3. SOB (shortness of breath)  I suspect this is multifactorial in etiology.  Some is certainly related to her underlying lung disease as well as ongoing tobacco abuse.  This could also represent her obesity and poor effort tolerance with aerobic deconditioning.  This could also represent hypertensive related cardiac disease " or unrecognized congestive heart failure.  This could also be an angina equivalent.  - Stress Test With Myocardial Perfusion Two Day  - Adult Transthoracic Echo Complete    4. Palpitations  Some of the patient's symptoms could be due to arrhythmia and/or ectopy.  The patient has never worn a heart monitor.  - Adult Transthoracic Echo Complete  - Holter Monitor - 48 Hour  Procedures     Plan:       I have recommended a vasodilator nuclear stress test, an echocardiogram and a Holter monitor.  The patient has been counseled regarding the essential need to discontinue cigarette smoking.  Further recommendations will be predicated on the results of her outpatient testing.

## 2017-08-17 LAB
BH CV ECHO MEAS - % IVS THICK: 12.5 %
BH CV ECHO MEAS - % LVPW THICK: 42.1 %
BH CV ECHO MEAS - AO MAX PG (FULL): 3.9 MMHG
BH CV ECHO MEAS - AO MAX PG: 8.4 MMHG
BH CV ECHO MEAS - AO MEAN PG (FULL): 3 MMHG
BH CV ECHO MEAS - AO MEAN PG: 5 MMHG
BH CV ECHO MEAS - AO ROOT AREA (BSA CORRECTED): 1.2
BH CV ECHO MEAS - AO ROOT AREA: 4.2 CM^2
BH CV ECHO MEAS - AO ROOT DIAM: 2.3 CM
BH CV ECHO MEAS - AO V2 MAX: 144.5 CM/SEC
BH CV ECHO MEAS - AO V2 MEAN: 108 CM/SEC
BH CV ECHO MEAS - AO V2 VTI: 24 CM
BH CV ECHO MEAS - AVA(I,A): 3.3 CM^2
BH CV ECHO MEAS - AVA(I,D): 3.3 CM^2
BH CV ECHO MEAS - AVA(V,A): 2.6 CM^2
BH CV ECHO MEAS - AVA(V,D): 2.6 CM^2
BH CV ECHO MEAS - BSA(HAYCOCK): 2 M^2
BH CV ECHO MEAS - BSA: 1.9 M^2
BH CV ECHO MEAS - BZI_BMI: 32 KILOGRAMS/M^2
BH CV ECHO MEAS - BZI_METRIC_HEIGHT: 165.1 CM
BH CV ECHO MEAS - BZI_METRIC_WEIGHT: 87.1 KG
BH CV ECHO MEAS - EDV(CUBED): 161.9 ML
BH CV ECHO MEAS - EDV(MOD-SP4): 129 ML
BH CV ECHO MEAS - EDV(TEICH): 144.4 ML
BH CV ECHO MEAS - EF(CUBED): 75.7 %
BH CV ECHO MEAS - EF(MOD-SP4): 78 %
BH CV ECHO MEAS - EF(TEICH): 67.1 %
BH CV ECHO MEAS - ESV(CUBED): 39.3 ML
BH CV ECHO MEAS - ESV(TEICH): 47.4 ML
BH CV ECHO MEAS - FS: 37.6 %
BH CV ECHO MEAS - IVS/LVPW: 1.7
BH CV ECHO MEAS - IVSD: 1 CM
BH CV ECHO MEAS - IVSS: 1.8 CM
BH CV ECHO MEAS - LA DIMENSION: 3.6 CM
BH CV ECHO MEAS - LA/AO: 1.6
BH CV ECHO MEAS - LV DIASTOLIC VOL/BSA (35-75): 66.4 ML/M^2
BH CV ECHO MEAS - LV MASS(C)D: 291.9 GRAMS
BH CV ECHO MEAS - LV MASS(C)DI: 150.1 GRAMS/M^2
BH CV ECHO MEAS - LV MASS(C)S: 201.7 GRAMS
BH CV ECHO MEAS - LV MASS(C)SI: 103.7 GRAMS/M^2
BH CV ECHO MEAS - LV MAX PG: 4.5 MMHG
BH CV ECHO MEAS - LV MEAN PG: 2 MMHG
BH CV ECHO MEAS - LV V1 MAX: 106.5 CM/SEC
BH CV ECHO MEAS - LV V1 MEAN: 57.3 CM/SEC
BH CV ECHO MEAS - LV V1 VTI: 22.8 CM
BH CV ECHO MEAS - LVIDD: 5.5 CM
BH CV ECHO MEAS - LVIDS: 3.4 CM
BH CV ECHO MEAS - LVLD AP4: 8.3 CM
BH CV ECHO MEAS - LVOT AREA (M): 3.5 CM^2
BH CV ECHO MEAS - LVOT AREA: 3.5 CM^2
BH CV ECHO MEAS - LVOT DIAM: 2.1 CM
BH CV ECHO MEAS - LVPWD: 0.95 CM
BH CV ECHO MEAS - LVPWS: 1.4 CM
BH CV ECHO MEAS - MV A MAX VEL: 77 CM/SEC
BH CV ECHO MEAS - MV DEC SLOPE: 326.5 CM/SEC^2
BH CV ECHO MEAS - MV DEC TIME: 0.19 SEC
BH CV ECHO MEAS - MV E MAX VEL: 60.5 CM/SEC
BH CV ECHO MEAS - MV E/A: 0.79
BH CV ECHO MEAS - MV P1/2T MAX VEL: 58.4 CM/SEC
BH CV ECHO MEAS - MV P1/2T: 52.4 MSEC
BH CV ECHO MEAS - MVA P1/2T LCG: 3.8 CM^2
BH CV ECHO MEAS - MVA(P1/2T): 4.2 CM^2
BH CV ECHO MEAS - PA MAX PG: 4.8 MMHG
BH CV ECHO MEAS - PA V2 MAX: 110 CM/SEC
BH CV ECHO MEAS - RAP SYSTOLE: 10 MMHG
BH CV ECHO MEAS - RVSP: 24.9 MMHG
BH CV ECHO MEAS - SI(AO): 51.3 ML/M^2
BH CV ECHO MEAS - SI(CUBED): 63 ML/M^2
BH CV ECHO MEAS - SI(LVOT): 40.6 ML/M^2
BH CV ECHO MEAS - SI(TEICH): 49.9 ML/M^2
BH CV ECHO MEAS - SV(AO): 99.7 ML
BH CV ECHO MEAS - SV(CUBED): 122.6 ML
BH CV ECHO MEAS - SV(LVOT): 79 ML
BH CV ECHO MEAS - SV(TEICH): 96.9 ML
BH CV ECHO MEAS - TR MAX VEL: 192.3 CM/SEC
BH CV ECHO MEAS - TV MAX PG: 1.3 MMHG
BH CV ECHO MEAS - TV V2 MAX: 57.1 CM/SEC
LEFT ATRIUM VOLUME INDEX: 18 ML/M2
LV EF 2D ECHO EST: 80 %

## 2017-08-18 ENCOUNTER — TELEPHONE (OUTPATIENT)
Dept: CARDIOLOGY | Facility: CLINIC | Age: 42
End: 2017-08-18

## 2017-08-18 NOTE — TELEPHONE ENCOUNTER
Patient came into the office asking for explanation of the ECHO results that she had seen on My Chart.  I explained to her the results.  Machelle Singh MA

## 2017-08-24 ENCOUNTER — HOSPITAL ENCOUNTER (OUTPATIENT)
Dept: NUCLEAR MEDICINE | Facility: HOSPITAL | Age: 42
Discharge: HOME | End: 2017-08-24
Attending: INTERNAL MEDICINE

## 2017-08-24 PROCEDURE — 93018 CV STRESS TEST I&R ONLY: CPT | Performed by: INTERNAL MEDICINE

## 2017-08-24 PROCEDURE — 78452 HT MUSCLE IMAGE SPECT MULT: CPT

## 2017-08-24 PROCEDURE — 93017 CV STRESS TEST TRACING ONLY: CPT

## 2017-08-24 PROCEDURE — 25010000002 REGADENOSON 0.4 MG/5ML SOLUTION: Performed by: INTERNAL MEDICINE

## 2017-08-24 PROCEDURE — 0 TECHNETIUM SESTAMIBI: Performed by: INTERNAL MEDICINE

## 2017-08-24 PROCEDURE — A9500 TC99M SESTAMIBI: HCPCS | Performed by: INTERNAL MEDICINE

## 2017-08-24 PROCEDURE — 78452 HT MUSCLE IMAGE SPECT MULT: CPT | Performed by: INTERNAL MEDICINE

## 2017-08-24 RX ADMIN — REGADENOSON 0.4 MG: 0.08 INJECTION, SOLUTION INTRAVENOUS at 09:00

## 2017-08-24 RX ADMIN — TECHNETIUM TC-99M SESTAMIBI 1 DOSE: 1 INJECTION INTRAVENOUS at 08:15

## 2017-08-25 ENCOUNTER — HOSPITAL ENCOUNTER (OUTPATIENT)
Dept: GENERAL RADIOLOGY | Facility: HOSPITAL | Age: 42
Discharge: HOME | End: 2017-08-25
Attending: INTERNAL MEDICINE

## 2017-08-25 ENCOUNTER — HOSPITAL ENCOUNTER (OUTPATIENT)
Dept: NUCLEAR MEDICINE | Facility: HOSPITAL | Age: 42
Discharge: HOME | End: 2017-08-25
Attending: INTERNAL MEDICINE

## 2017-08-25 LAB
BH CV NUCLEAR PRIOR STUDY: 3
BH CV STRESS COMMENTS STAGE 1: NORMAL
BH CV STRESS DOSE REGADENOSON STAGE 1: 0.4
BH CV STRESS DURATION MIN STAGE 1: 0
BH CV STRESS DURATION SEC STAGE 1: 15
BH CV STRESS PROTOCOL 1: NORMAL
BH CV STRESS RECOVERY BP: NORMAL MMHG
BH CV STRESS RECOVERY HR: 110 BPM
BH CV STRESS STAGE 1: 1
LV EF NUC BP: 71 %
MAXIMAL PREDICTED HEART RATE: 179 BPM
PERCENT MAX PREDICTED HR: 63.69 %
STRESS BASELINE BP: NORMAL MMHG
STRESS BASELINE HR: 89 BPM
STRESS PERCENT HR: 75 %
STRESS POST PEAK BP: NORMAL MMHG
STRESS POST PEAK HR: 114 BPM
STRESS TARGET HR: 152 BPM

## 2017-08-25 PROCEDURE — A9500 TC99M SESTAMIBI: HCPCS | Performed by: INTERNAL MEDICINE

## 2017-08-25 PROCEDURE — 0 TECHNETIUM SESTAMIBI: Performed by: INTERNAL MEDICINE

## 2017-08-25 RX ADMIN — TECHNETIUM TC-99M SESTAMIBI 1 DOSE: 1 INJECTION INTRAVENOUS at 08:00

## 2017-09-07 ENCOUNTER — OFFICE VISIT (OUTPATIENT)
Dept: CARDIOLOGY | Facility: CLINIC | Age: 42
End: 2017-09-07

## 2017-09-07 VITALS
HEART RATE: 97 BPM | SYSTOLIC BLOOD PRESSURE: 124 MMHG | BODY MASS INDEX: 32.69 KG/M2 | HEIGHT: 65 IN | RESPIRATION RATE: 16 BRPM | DIASTOLIC BLOOD PRESSURE: 72 MMHG | OXYGEN SATURATION: 98 % | WEIGHT: 196.2 LBS

## 2017-09-07 DIAGNOSIS — R06.02 SOB (SHORTNESS OF BREATH): ICD-10-CM

## 2017-09-07 DIAGNOSIS — R07.2 PRECORDIAL PAIN: ICD-10-CM

## 2017-09-07 DIAGNOSIS — R00.2 PALPITATIONS: ICD-10-CM

## 2017-09-07 DIAGNOSIS — I10 ESSENTIAL HYPERTENSION: Primary | ICD-10-CM

## 2017-09-07 DIAGNOSIS — R60.0 BILATERAL LOWER EXTREMITY EDEMA: ICD-10-CM

## 2017-09-07 PROCEDURE — 99214 OFFICE O/P EST MOD 30 MIN: CPT | Performed by: INTERNAL MEDICINE

## 2017-09-07 RX ORDER — DILTIAZEM HYDROCHLORIDE 180 MG/1
180 CAPSULE, EXTENDED RELEASE ORAL DAILY
Qty: 30 CAPSULE | Refills: 11 | Status: SHIPPED | OUTPATIENT
Start: 2017-09-07 | End: 2017-10-19

## 2017-09-07 NOTE — PROGRESS NOTES
Subjective:     Encounter Date:09/07/2017      Patient ID: April Judy Nicolas is a 42 y.o. female.    Chief Complaint:Chest discomfort palpitations shortness of breath and edema  HPI  This is a 42-year-old female patient who presents to cardiology clinic after a battery of outpatient testing performed to evaluate a multitude of cardiovascular complaints.  The patient has undergone a vasodilator nuclear stress test to evaluate atypical chest discomfort.  Nuclear stress testing shows no evidence of ischemia or infarction.  The calculated ejection fraction is normal.  Her chest discomfort is essentially unchanged compared to her initial visit.   The chest discomfort will generally lasts for several hours per episode.  The discomfort is discretely located in the mid sternum and will occasionally radiate into her mid thoracic back.  The discomfort is described as having a pressure-like quality and is associated with shortness of breath.  It typically occurs at rest.  There does not appear to be an exertional component.  The discomfort seems to improve with use of aspirin.  She also reports having shortness of breath for the last 2 weeks.  She reports that the shortness of breath occurs continuously.  The patient has undergone an echocardiogram which is completely normal.  Her echocardiogram shows normal left ventricular ejection fraction with no regional wall motion abnormalities.  Diastolic function is normal.  There are no valvular abnormalities, evidence of intracardiac shunting, secondary pulmonary hypertension or pericardial disease.  There is no ventricular hypertrophy or cardiac chamber enlargement.   She cannot identify any precipitating aggravating or alleviating features to it in the chest pain or shortness of breath.  The chest pain typically has a 3-5/10 in intensity.  There is no associated orthopnea or PND but she does have some swelling in her feet and ankles primarily in her right leg.  The patient  also reports having palpitations which she describes as a sense that her heart is both fluttering and pounding.  This is also been present for the last 2 weeks.  It typically occurs on a daily basis and generally lasts anywhere from a few seconds to several minutes.  She cannot identify any precipitating aggravating or alleviating features.  She has no associated dizziness or syncope.  The patient wore a 48 hour Holter monitor which was completely normal.  There was no PACs or PVCs.  There was no supraventricular or ventricular tachycardia.  The patient was demonstrated to have symptomatic episodes of sinus tachycardia.  She thinks these were triggered by being in chronic pain.  The following portions of the patient's history were reviewed and updated as appropriate: allergies, current medications, past family history, past medical history, past social history, past surgical history and problem  Review of Systems   Constitution: Negative for chills, diaphoresis, fever, weakness, malaise/fatigue, night sweats, weight gain and weight loss.   HENT: Negative for ear discharge, hearing loss, hoarse voice and nosebleeds.    Eyes: Negative for discharge, double vision, pain and photophobia.   Cardiovascular: Positive for chest pain, leg swelling and palpitations. Negative for claudication, cyanosis, dyspnea on exertion, irregular heartbeat, near-syncope, orthopnea, paroxysmal nocturnal dyspnea and syncope.   Respiratory: Positive for shortness of breath. Negative for cough, hemoptysis, sputum production and wheezing.    Endocrine: Negative for cold intolerance, heat intolerance, polydipsia, polyphagia and polyuria.   Hematologic/Lymphatic: Negative for adenopathy and bleeding problem. Does not bruise/bleed easily.   Skin: Negative for color change, flushing, itching and rash.   Musculoskeletal: Negative for muscle cramps, muscle weakness, myalgias and stiffness.   Gastrointestinal: Negative for abdominal pain, diarrhea,  hematemesis, hematochezia, nausea and vomiting.   Genitourinary: Negative for dysuria, frequency and nocturia.   Neurological: Negative for dizziness, focal weakness, loss of balance, numbness, paresthesias and seizures.   Psychiatric/Behavioral: Negative for altered mental status, hallucinations and suicidal ideas.   Allergic/Immunologic: Negative for HIV exposure, hives and persistent infections.       Current Outpatient Prescriptions:   •  albuterol (PROVENTIL HFA;VENTOLIN HFA) 108 (90 BASE) MCG/ACT inhaler, Inhale 2 puffs Every 6 (Six) Hours As Needed for wheezing., Disp: 1 inhaler, Rfl: 11  •  cyclobenzaprine (FLEXERIL) 10 MG tablet, Take 1 tablet by mouth 2 (Two) Times a Day As Needed for Muscle Spasms., Disp: 30 tablet, Rfl: 1  •  diclofenac (VOLTAREN) 75 MG EC tablet, TAKE 1 TABLET BY MOUTH TWO TIMES A DAY AS NEEDED for back pain, Disp: 60 tablet, Rfl: 5  •  estradiol (ESTRACE) 2 MG tablet, Take 1/2 tab x 2 weeks, if still having hot flashes increase to full tab, Disp: 30 tablet, Rfl: 2  •  flunisolide (NASALIDE) 25 MCG/ACT (0.025%) solution nasal spray, Inhale 1 spray Every 12 (Twelve) Hours., Disp: 1 bottle, Rfl: 5  •  furosemide (LASIX) 20 MG tablet, Take 20 mg by mouth daily as needed., Disp: , Rfl:   •  gabapentin (NEURONTIN) 300 MG capsule, Take 1 capsule by mouth 3 (Three) Times a Day., Disp: 90 capsule, Rfl: 2  •  potassium chloride (MICRO-K) 10 MEQ CR capsule, Take 1 capsule by mouth Daily As Needed (take when taking Furosemide)., Disp: 30 capsule, Rfl: 5  •  pramipexole (MIRAPEX) 1.5 MG tablet, Take 1 tablet by mouth Every Night., Disp: 30 tablet, Rfl: 2  •  promethazine (PHENERGAN) 25 MG tablet, Take 1 tablet by mouth Daily As Needed (migraine)., Disp: 30 tablet, Rfl: 1  •  sertraline (ZOLOFT) 100 MG tablet, Take 1 tablet by mouth Daily., Disp: 30 tablet, Rfl: 1  •  traZODone (DESYREL) 50 MG tablet, Take 1-2 tabs at night as needed, Disp: 60 tablet, Rfl: 2  •  triamterene-hydrochlorothiazide  "(MAXZIDE-25) 37.5-25 MG per tablet, Take 1 tablet by mouth Daily., Disp: 30 tablet, Rfl: 5  •  diltiazem XR (DILACOR XR) 180 MG 24 hr capsule, Take 1 capsule by mouth Daily., Disp: 30 capsule, Rfl: 11     Objective:     Physical Exam   Constitutional: She is oriented to person, place, and time. She appears well-developed and well-nourished.   HENT:   Head: Normocephalic and atraumatic.   Eyes: Conjunctivae are normal. No scleral icterus.   Cardiovascular: Normal rate, regular rhythm, normal heart sounds and intact distal pulses.  Exam reveals no gallop and no friction rub.    No murmur heard.  Pulmonary/Chest: Effort normal and breath sounds normal. No respiratory distress.   Abdominal: Soft. Bowel sounds are normal. There is no tenderness.   Musculoskeletal: She exhibits no edema.   Neurological: She is alert and oriented to person, place, and time.   Skin: Skin is warm and dry.   Psychiatric: She has a normal mood and affect. Her behavior is normal.     Blood pressure 124/72, pulse 97, resp. rate 16, height 65\" (165.1 cm), weight 196 lb 3.2 oz (89 kg), SpO2 98 %.   Lab Review:       Assessment:         1. Essential hypertension  Excellent blood pressure control.    2. Palpitations  This appears to be due to sinus tachycardia driven by chronic pain syndrome.  I suspect there is an element of chronic dehydration as the patient takes 3 different diuretic therapies intermittently for lower extremity edema.    3. SOB (shortness of breath)  No evidence of a cardiac etiology.  There is no evidence of ischemic heart disease.  There is no evidence of systolic or diastolic heart failure.  There is no evidence of valvular heart disease, pulmonary hypertension or pericardial disease.    4. Precordial pain  Noncardiac chest pain.    5. Bilateral lower extremity edema  This appears to be due to chronic venous insufficiency.  The patient currently uses a combination of 3 different diuretic therapies intermittently for swelling.  " She would be better served with leg elevation and compression stockings.  Procedures     Plan:       No additional cardiovascular testing is indicated.  I have recommended discontinuing the Altace and in its place initiating long-acting diltiazem at 180 mg once per day.  I have recommended that she use diuretic therapy sparingly.  She has been counseled regarding the importance of dietary salt restriction as well as a elimination of dietary caffeine.  She has been counseled regarding the importance of leg elevation.  She has been counseled regarding the importance of compression stockings.

## 2017-09-08 ENCOUNTER — OFFICE VISIT (OUTPATIENT)
Dept: INTERNAL MEDICINE | Facility: CLINIC | Age: 42
End: 2017-09-08

## 2017-09-08 VITALS
SYSTOLIC BLOOD PRESSURE: 128 MMHG | OXYGEN SATURATION: 98 % | BODY MASS INDEX: 32.82 KG/M2 | HEIGHT: 65 IN | WEIGHT: 197 LBS | HEART RATE: 100 BPM | DIASTOLIC BLOOD PRESSURE: 80 MMHG | RESPIRATION RATE: 12 BRPM

## 2017-09-08 DIAGNOSIS — G56.21 ULNAR NEUROPATHY AT ELBOW, RIGHT: ICD-10-CM

## 2017-09-08 DIAGNOSIS — G56.03 CARPAL TUNNEL SYNDROME ON BOTH SIDES: ICD-10-CM

## 2017-09-08 DIAGNOSIS — M06.4 INFLAMMATORY POLYARTHROPATHY (HCC): Primary | ICD-10-CM

## 2017-09-08 PROCEDURE — 99213 OFFICE O/P EST LOW 20 MIN: CPT | Performed by: FAMILY MEDICINE

## 2017-09-08 NOTE — PROGRESS NOTES
C/O bilateral foot pain, right knee pain, right shoulder pain, right elbow pain. Had to leave work early today. After about 2 hours, her feet start burning. She is going to talk to her employer and see if she can go down to part time. She would like to discuss applying for disability.    She brought FMLA papers today for carpal tunnel. Employer needs restrictions.     SUBJECTIVE: April Judy Nicolas is a 42 y.o. female seen for a follow up visit;      Multiple joint pain: started getting joint pain in bottoms of feet - burning like they are on fire, then her right knee started hurting - pain all around bottom of knee w/ occasional sharp pain in back of knee.  This starts up within about 4 hours of starting work.  Next her right shoulder started hurting w/ tenderness along the top of her deltoid - pain with abduction & when she pushes off a chair.  Then her right elbow started hurting (although she did do some house work yesterday) pain on back of elbow along olecranon.      CTS: She is still having her CTS symptoms and has still not gotten a work comp claim number for this so we still can't see the ortho doc about it.  She wears her braces at night so that at least helps night-time symptoms.       The following portions of the patient's history were reviewed and updated as appropriate: She  has a past medical history of Hypertension; Malignant melanoma; and Sleep apnea.  She has Moderate episode of recurrent major depressive disorder; Migraine with aura and without status migrainosus, not intractable; Anxiety; and Surgical menopause on hormone replacement therapy on her pertinent problem list.  She  has a past surgical history that includes Skin cancer excision and Total abdominal hysterectomy w/ bilateral salpingoophorectomy (2004).  Her family history includes Alzheimer's disease in her maternal grandfather; COPD in her father; Crohn's disease in her sister; Kidney disease in her maternal grandmother; No Known  "Problems in her maternal aunt, maternal uncle, paternal aunt, paternal grandfather, paternal grandmother, and paternal uncle; Rheum arthritis in her mother; Ulcerative colitis in her mother. There is no history of Breast cancer or Ovarian cancer.  She  reports that she has been smoking Cigarettes.  She has a 8.00 pack-year smoking history. She has never used smokeless tobacco. She reports that she does not drink alcohol or use illicit drugs.  She has a current medication list which includes the following prescription(s): albuterol, cyclobenzaprine, diclofenac, diltiazem xr, estradiol, flunisolide, furosemide, gabapentin, potassium chloride, pramipexole, promethazine, trazodone, triamterene-hydrochlorothiazide, and sertraline..    Review of Systems   Constitutional: Positive for fatigue. Negative for chills and fever.   Respiratory: Negative.    Cardiovascular: Positive for leg swelling.   Musculoskeletal: Positive for joint swelling.   Neurological: Positive for headaches.         OBJECTIVE:  /80  Pulse 100  Resp 12  Ht 65\" (165.1 cm)  Wt 197 lb (89.4 kg)  SpO2 98%  BMI 32.78 kg/m2     Physical Exam   Constitutional: She appears well-developed and well-nourished. No distress.   Musculoskeletal: She exhibits edema.   2+ edema to mid calfs, venous stasis color changes           ASSESSMENT:   Diagnosis Plan   1. Inflammatory polyarthropathy  Rheumatoid Arthritis Expanded Panel    ELÍAS Comprehensive Plus Profile    Antistreptolysin O Titer    Lyme, Total Antibody Test / Reflex    Glia(IgA / G) & TTG(IgA / G)    Vitamin B12   2. Carpal tunnel syndrome on both sides     3. Ulnar neuropathy at elbow, right       Again advised that she has to push to get the work comp claim started so that we can actually start getting the CTS treated.  The elbow pain at least may be related to that.      FMLA just sent in yesterday regarding her migraines.      Return in about 6 weeks (around 10/20/2017).      Fouzia KLEIN " MD Alfredo, hereby attest that the medical record entry above accurately reflects signatures/notations that I made in my capacity as Fouzia Fuentes MD when I treated and diagnosed the above patient.  I do hereby attest that his information is true, accurate, and complete to the best of my knowledge and I understand that any falsification, omission, or concealment of material fact may subject me to administrative, civil, or criminal liability.

## 2017-09-11 LAB
ASO AB SERPL-ACNC: 112.4 IU/ML (ref 0–200)
B BURGDOR IGG+IGM SER-ACNC: <0.91 ISR (ref 0–0.9)
CCP IGA+IGG SERPL IA-ACNC: 5 UNITS (ref 0–19)
CENTROMERE B AB SER-ACNC: <0.2 AI (ref 0–0.9)
CHROMATIN AB SERPL-ACNC: <0.2 AI (ref 0–0.9)
CRP SERPL-MCNC: 14.3 MG/L (ref 0–4.9)
DSDNA AB SER-ACNC: 1 IU/ML (ref 0–9)
ENA JO1 AB SER-ACNC: <0.2 AI (ref 0–0.9)
ENA RNP AB SER-ACNC: <0.2 AI (ref 0–0.9)
ENA SCL70 AB SER-ACNC: <0.2 AI (ref 0–0.9)
ENA SM AB SER-ACNC: <0.2 AI (ref 0–0.9)
ENA SM+RNP AB SER-ACNC: <0.2 AI (ref 0–0.9)
ENA SS-A AB SER-ACNC: 0.3 AI (ref 0–0.9)
ENA SS-B AB SER-ACNC: <0.2 AI (ref 0–0.9)
ERYTHROCYTE [SEDIMENTATION RATE] IN BLOOD BY WESTERGREN METHOD: 10 MM/HR (ref 0–32)
GLIADIN PEPTIDE IGA SER-ACNC: 3 UNITS (ref 0–19)
GLIADIN PEPTIDE IGG SER-ACNC: 2 UNITS (ref 0–19)
Lab: NORMAL
RHEUMATOID FACT SERPL-ACNC: <10 IU/ML (ref 0–13.9)
RIBOSOMAL P AB SER-ACNC: <0.2 AI (ref 0–0.9)
TTG IGA SER-ACNC: <2 U/ML (ref 0–3)
TTG IGG SER-ACNC: <2 U/ML (ref 0–5)
VIT B12 SERPL-MCNC: 733 PG/ML (ref 239–931)

## 2017-09-14 ENCOUNTER — OFFICE VISIT (OUTPATIENT)
Dept: INTERNAL MEDICINE | Facility: CLINIC | Age: 42
End: 2017-09-14

## 2017-09-14 VITALS
HEIGHT: 65 IN | RESPIRATION RATE: 12 BRPM | WEIGHT: 196.8 LBS | BODY MASS INDEX: 32.79 KG/M2 | DIASTOLIC BLOOD PRESSURE: 80 MMHG | TEMPERATURE: 97.9 F | OXYGEN SATURATION: 98 % | HEART RATE: 92 BPM | SYSTOLIC BLOOD PRESSURE: 124 MMHG

## 2017-09-14 DIAGNOSIS — M79.7 FIBROMYALGIA: Primary | ICD-10-CM

## 2017-09-14 DIAGNOSIS — G43.109 MIGRAINE WITH AURA AND WITHOUT STATUS MIGRAINOSUS, NOT INTRACTABLE: ICD-10-CM

## 2017-09-14 DIAGNOSIS — M06.4 INFLAMMATORY POLYARTHROPATHY (HCC): ICD-10-CM

## 2017-09-14 DIAGNOSIS — R60.0 BILATERAL LOWER EXTREMITY EDEMA: ICD-10-CM

## 2017-09-14 DIAGNOSIS — R79.82 ELEVATED C-REACTIVE PROTEIN (CRP): ICD-10-CM

## 2017-09-14 PROCEDURE — 96372 THER/PROPH/DIAG INJ SC/IM: CPT | Performed by: FAMILY MEDICINE

## 2017-09-14 PROCEDURE — 99213 OFFICE O/P EST LOW 20 MIN: CPT | Performed by: FAMILY MEDICINE

## 2017-09-14 RX ORDER — KETOROLAC TROMETHAMINE 30 MG/ML
30 INJECTION, SOLUTION INTRAMUSCULAR; INTRAVENOUS ONCE
Status: COMPLETED | OUTPATIENT
Start: 2017-09-14 | End: 2017-09-14

## 2017-09-14 RX ORDER — PROMETHAZINE HYDROCHLORIDE 25 MG/ML
25 INJECTION, SOLUTION INTRAMUSCULAR; INTRAVENOUS ONCE
Status: COMPLETED | OUTPATIENT
Start: 2017-09-14 | End: 2017-09-14

## 2017-09-14 RX ADMIN — KETOROLAC TROMETHAMINE 30 MG: 30 INJECTION, SOLUTION INTRAMUSCULAR; INTRAVENOUS at 17:37

## 2017-09-14 RX ADMIN — PROMETHAZINE HYDROCHLORIDE 25 MG: 25 INJECTION, SOLUTION INTRAMUSCULAR; INTRAVENOUS at 17:40

## 2017-09-14 NOTE — PROGRESS NOTES
C/O migraine on and off since Sunday. Took phenergan last night but it did not help. Pain in joints worse when she is at work. She will work about 2 hours and feet start burning, knees hurt. Cannot work over 4 hours at a time. Right shoulder and right elbow are hurting now too. Feet are more swollen today, toes are darker on right foot.     SUBJECTIVE: April Judy Nicolas is a 42 y.o. female seen for a follow up visit;      Migraine: Started on Sunday, took phenergan a few times, but didn't resolve it.  Now much worse today and had to leave work.     LE edema: gets severe B/L LE edema, L>R, worsening when she sits on it for too long.  Saw Jack > 1 year ago, thinks she has some vein issues, wanted her to have surgery.  Hasn't gotten new stockings in a year.        The following portions of the patient's history were reviewed and updated as appropriate: She  has a past medical history of Hypertension; Malignant melanoma; and Sleep apnea.  She has Moderate episode of recurrent major depressive disorder; Migraine with aura and without status migrainosus, not intractable; Anxiety; and Surgical menopause on hormone replacement therapy on her pertinent problem list.  She  has a past surgical history that includes Skin cancer excision and Total abdominal hysterectomy w/ bilateral salpingoophorectomy (2004).  Her family history includes Alzheimer's disease in her maternal grandfather; COPD in her father; Crohn's disease in her sister; Kidney disease in her maternal grandmother; No Known Problems in her maternal aunt, maternal uncle, paternal aunt, paternal grandfather, paternal grandmother, and paternal uncle; Rheum arthritis in her mother; Ulcerative colitis in her mother. There is no history of Breast cancer or Ovarian cancer.  She  reports that she has been smoking Cigarettes.  She has a 8.00 pack-year smoking history. She has never used smokeless tobacco. She reports that she does not drink alcohol or use illicit  "drugs.  She has a current medication list which includes the following prescription(s): albuterol, cyclobenzaprine, diclofenac, estradiol, flunisolide, furosemide, gabapentin, potassium chloride, pramipexole, promethazine, trazodone, triamterene-hydrochlorothiazide, diltiazem xr, and sertraline..    Review of Systems   Constitutional: Positive for fatigue.   Cardiovascular: Positive for leg swelling.   Musculoskeletal: Positive for arthralgias, joint swelling and myalgias.   Neurological: Positive for headaches.         OBJECTIVE:  /80  Pulse 92  Temp 97.9 °F (36.6 °C)  Resp 12  Ht 65\" (165.1 cm)  Wt 196 lb 12.8 oz (89.3 kg)  LMP  (Exact Date)  SpO2 98%  BMI 32.75 kg/m2     Physical Exam   Constitutional: She appears well-developed and well-nourished. No distress.   Musculoskeletal: She exhibits edema.   B/L feet and ankles swollen, R>L, venous stasis skin color changes           ASSESSMENT:   Diagnosis Plan   1. Fibromyalgia  Ambulatory Referral to Rheumatology   2. Inflammatory polyarthropathy  Ambulatory Referral to Rheumatology   3. Elevated C-reactive protein (CRP)  Ambulatory Referral to Rheumatology   4. Migraine with aura and without status migrainosus, not intractable  promethazine (PHENERGAN) injection 25 mg    ketorolac (TORADOL) injection 30 mg   5. Bilateral lower extremity edema       Recommended she get new stockings to wear to work.     Return in about 1 month (around 10/14/2017).              I, Fouzia Fuentes MD, hereby attest that the medical record entry above accurately reflects signatures/notations that I made in my capacity as Fouzia Fuentes MD when I treated and diagnosed the above patient.  I do hereby attest that his information is true, accurate, and complete to the best of my knowledge and I understand that any falsification, omission, or concealment of material fact may subject me to administrative, civil, or criminal liability.      "

## 2017-09-21 DIAGNOSIS — F33.1 MODERATE EPISODE OF RECURRENT MAJOR DEPRESSIVE DISORDER (HCC): ICD-10-CM

## 2017-09-21 RX ORDER — SERTRALINE HYDROCHLORIDE 100 MG/1
TABLET, FILM COATED ORAL
Qty: 30 TABLET | Refills: 0 | Status: SHIPPED | OUTPATIENT
Start: 2017-09-21 | End: 2017-09-26 | Stop reason: SDUPTHER

## 2017-09-26 ENCOUNTER — PATIENT MESSAGE (OUTPATIENT)
Dept: INTERNAL MEDICINE | Facility: CLINIC | Age: 42
End: 2017-09-26

## 2017-09-26 DIAGNOSIS — Z79.890 SURGICAL MENOPAUSE ON HORMONE REPLACEMENT THERAPY: ICD-10-CM

## 2017-09-26 DIAGNOSIS — G25.81 RESTLESS LEG SYNDROME: ICD-10-CM

## 2017-09-26 DIAGNOSIS — E89.40 SURGICAL MENOPAUSE ON HORMONE REPLACEMENT THERAPY: ICD-10-CM

## 2017-09-26 DIAGNOSIS — F33.1 MODERATE EPISODE OF RECURRENT MAJOR DEPRESSIVE DISORDER (HCC): ICD-10-CM

## 2017-09-26 RX ORDER — ESTRADIOL 1 MG/1
1 TABLET ORAL DAILY
Qty: 30 TABLET | Refills: 2 | Status: SHIPPED | OUTPATIENT
Start: 2017-09-26 | End: 2017-12-21 | Stop reason: SDUPTHER

## 2017-09-26 RX ORDER — PRAMIPEXOLE DIHYDROCHLORIDE 1.5 MG/1
1.5 TABLET ORAL NIGHTLY
Qty: 30 TABLET | Refills: 2 | Status: SHIPPED | OUTPATIENT
Start: 2017-09-26 | End: 2017-12-28 | Stop reason: SDUPTHER

## 2017-09-26 RX ORDER — SERTRALINE HYDROCHLORIDE 100 MG/1
100 TABLET, FILM COATED ORAL DAILY
Qty: 30 TABLET | Refills: 2 | Status: SHIPPED | OUTPATIENT
Start: 2017-09-26 | End: 2017-10-30 | Stop reason: SDUPTHER

## 2017-09-26 NOTE — TELEPHONE ENCOUNTER
Sonia Rondon MA 9/26/2017 1:46 PM EDT        ----- Message -----   From: April Judy Nicolas   Sent: 9/26/2017 11:23 AM   To: M[facility] P[facility] Swanson  Clinical [place]  Subject: Prescription Question     I need refills on Sertraline 100mg.,Estradiol(I think we should go back down to .5), and Pramipexole 1.5mg.

## 2017-10-06 ENCOUNTER — TELEPHONE (OUTPATIENT)
Dept: INTERNAL MEDICINE | Facility: CLINIC | Age: 42
End: 2017-10-06

## 2017-10-12 ENCOUNTER — TELEPHONE (OUTPATIENT)
Dept: INTERNAL MEDICINE | Facility: CLINIC | Age: 42
End: 2017-10-12

## 2017-10-12 NOTE — TELEPHONE ENCOUNTER
----- Message from Fouzia Fuentes MD sent at 10/12/2017  1:15 PM EDT -----  Regarding: FW: Referral Request  Contact: [phone]  I have not been able to see her or treat her carpal tunnel disease since the first visit for that last spring when I determined that it was definitely a work-related injury.  Her regular insurance won't cover office visits or consultations for that.  As she was never able to get her work place to give her a work-comp claim number, I have not been able to see her for this problem.  Her last visit was for migraines and feet pain for which she was filing intermittent leave through FMLA, again, as it was a visit under her regular insurance, and not work comp, I could not and did not address her carpal tunnel syndrome.      And now apparently we AREN'T doing work comp for our regular patients unless the claim was already opened.  So I probably will never be able to see her for this.       ----- Message -----     From: Sonia Rondon MA     Sent: 10/12/2017   8:53 AM       To: Fouzia Fuentes MD  Subject: FW: Referral Request                                 ----- Message -----     From: Vanna Nicolas     Sent: 10/10/2017  12:22 PM       To: Fang Swanson Mr Clinical Pool  Subject: RE: Referral Request                             Worker's comp. I guess they didn't understand it. Could you either call them or fax them a letter explaining it to them? The fax # is [phone], phone # is [phone]. His name is Piter.  ----- Message -----  From: Fouzia Fuentes MD  Sent: 10/6/2017  6:09 PM EDT  To: Vanna Nicolas  Subject: RE: Referral Request    How could that have anything to do with it? We weren't even discussing your carpal tunnel that day, as that was a visit for non work-related injuries.  Do you mean you got denied for FMLA? Not workman's comp?       ----- Message -----     From: Vanna Nicolas     Sent: 10/6/2017 10:18 AM EDT       To: Fouzia Fuentes MD  Subject:  Referral Request    I got denied for workers comp because the health summary from September 14 didn't have bilateral carpal tunnel listed as one of my conditions. Is there any way you could add that and resend it to them?

## 2017-10-12 NOTE — TELEPHONE ENCOUNTER
----- Message from Fouzia Fuentes MD sent at 10/12/2017  1:15 PM EDT -----  Regarding: FW: Referral Request  Contact: [phone]  I have not been able to see her or treat her carpal tunnel disease since the first visit for that last spring when I determined that it was definitely a work-related injury.  Her regular insurance won't cover office visits or consultations for that.  As she was never able to get her work place to give her a work-comp claim number, I have not been able to see her for this problem.  Her last visit was for migraines and feet pain for which she was filing intermittent leave through FMLA, again, as it was a visit under her regular insurance, and not work comp, I could not and did not address her carpal tunnel syndrome.      And now apparently we AREN'T doing work comp for our regular patients unless the claim was already opened.  So I probably will never be able to see her for this.       ----- Message -----     From: Sonia Rondon MA     Sent: 10/12/2017   8:53 AM       To: Fouzia Fuentes MD  Subject: FW: Referral Request                                 ----- Message -----     From: Vanna Nicolas     Sent: 10/10/2017  12:22 PM       To: Fang Swanson Mr Clinical Pool  Subject: RE: Referral Request                             Worker's comp. I guess they didn't understand it. Could you either call them or fax them a letter explaining it to them? The fax # is [phone], phone # is [phone]. His name is Piter.  ----- Message -----  From: Fouzia Fuentes MD  Sent: 10/6/2017  6:09 PM EDT  To: Vanna Nicolas  Subject: RE: Referral Request    How could that have anything to do with it? We weren't even discussing your carpal tunnel that day, as that was a visit for non work-related injuries.  Do you mean you got denied for FMLA? Not workman's comp?       ----- Message -----     From: Vanna Nicolas     Sent: 10/6/2017 10:18 AM EDT       To: Fouzia Fuentes MD  Subject:  Referral Request    I got denied for workers comp because the health summary from September 14 didn't have bilateral carpal tunnel listed as one of my conditions. Is there any way you could add that and resend it to them?

## 2017-10-19 ENCOUNTER — OFFICE VISIT (OUTPATIENT)
Dept: INTERNAL MEDICINE | Facility: CLINIC | Age: 42
End: 2017-10-19

## 2017-10-19 VITALS
BODY MASS INDEX: 33.55 KG/M2 | DIASTOLIC BLOOD PRESSURE: 70 MMHG | WEIGHT: 201.4 LBS | HEART RATE: 93 BPM | RESPIRATION RATE: 12 BRPM | HEIGHT: 65 IN | SYSTOLIC BLOOD PRESSURE: 124 MMHG | OXYGEN SATURATION: 98 %

## 2017-10-19 DIAGNOSIS — M79.7 FIBROMYALGIA: ICD-10-CM

## 2017-10-19 DIAGNOSIS — R60.0 BILATERAL LOWER EXTREMITY EDEMA: Primary | ICD-10-CM

## 2017-10-19 DIAGNOSIS — G43.109 MIGRAINE WITH AURA AND WITHOUT STATUS MIGRAINOSUS, NOT INTRACTABLE: ICD-10-CM

## 2017-10-19 PROCEDURE — 99214 OFFICE O/P EST MOD 30 MIN: CPT | Performed by: FAMILY MEDICINE

## 2017-10-19 PROCEDURE — 96372 THER/PROPH/DIAG INJ SC/IM: CPT | Performed by: FAMILY MEDICINE

## 2017-10-19 RX ORDER — FUROSEMIDE 40 MG/1
40 TABLET ORAL DAILY PRN
Qty: 30 TABLET | Refills: 2 | Status: SHIPPED | OUTPATIENT
Start: 2017-10-19 | End: 2017-12-28

## 2017-10-19 RX ORDER — GABAPENTIN 600 MG/1
600 TABLET ORAL 3 TIMES DAILY PRN
Qty: 90 TABLET | Refills: 1 | Status: SHIPPED | OUTPATIENT
Start: 2017-10-19 | End: 2017-12-07 | Stop reason: SDUPTHER

## 2017-10-19 RX ORDER — POTASSIUM CHLORIDE 750 MG/1
20 CAPSULE, EXTENDED RELEASE ORAL DAILY PRN
Qty: 60 CAPSULE | Refills: 2 | Status: SHIPPED | OUTPATIENT
Start: 2017-10-19 | End: 2017-12-28

## 2017-10-19 RX ORDER — IBUPROFEN 800 MG/1
TABLET ORAL
Refills: 0 | COMMUNITY
Start: 2017-10-09 | End: 2017-12-07 | Stop reason: SDUPTHER

## 2017-10-19 RX ORDER — PREDNISONE 10 MG/1
TABLET ORAL
Refills: 0 | COMMUNITY
Start: 2017-10-09 | End: 2017-10-30

## 2017-10-19 RX ORDER — BENZONATATE 200 MG/1
CAPSULE ORAL
Refills: 0 | COMMUNITY
Start: 2017-10-09 | End: 2017-10-30

## 2017-10-19 RX ORDER — RAMIPRIL 5 MG/1
CAPSULE ORAL
Refills: 2 | COMMUNITY
Start: 2017-10-18 | End: 2017-10-19

## 2017-10-19 RX ORDER — KETOROLAC TROMETHAMINE 30 MG/ML
30 INJECTION, SOLUTION INTRAMUSCULAR; INTRAVENOUS ONCE
Status: COMPLETED | OUTPATIENT
Start: 2017-10-19 | End: 2017-10-19

## 2017-10-19 RX ADMIN — KETOROLAC TROMETHAMINE 30 MG: 30 INJECTION, SOLUTION INTRAMUSCULAR; INTRAVENOUS at 17:03

## 2017-10-24 NOTE — TELEPHONE ENCOUNTER
Faxed office note from when all FMLA and issues started, 5-25-17.   Mailed FMLA forms to: [employer]                [address]                  [place] [address]

## 2017-10-27 ENCOUNTER — TELEPHONE (OUTPATIENT)
Dept: INTERNAL MEDICINE | Facility: CLINIC | Age: 42
End: 2017-10-27

## 2017-10-27 NOTE — TELEPHONE ENCOUNTER
Kayla babin [facility] called and LM for a return call on 10-25-17. Lm for her to return my call.

## 2017-10-30 ENCOUNTER — DOCUMENTATION (OUTPATIENT)
Dept: INTERNAL MEDICINE | Facility: CLINIC | Age: 42
End: 2017-10-30

## 2017-10-30 ENCOUNTER — OFFICE VISIT (OUTPATIENT)
Dept: INTERNAL MEDICINE | Facility: CLINIC | Age: 42
End: 2017-10-30

## 2017-10-30 ENCOUNTER — TELEPHONE (OUTPATIENT)
Dept: INTERNAL MEDICINE | Facility: CLINIC | Age: 42
End: 2017-10-30

## 2017-10-30 VITALS
HEART RATE: 90 BPM | BODY MASS INDEX: 34.42 KG/M2 | RESPIRATION RATE: 12 BRPM | OXYGEN SATURATION: 98 % | DIASTOLIC BLOOD PRESSURE: 80 MMHG | WEIGHT: 206.6 LBS | HEIGHT: 65 IN | SYSTOLIC BLOOD PRESSURE: 122 MMHG

## 2017-10-30 DIAGNOSIS — G43.109 MIGRAINE WITH AURA AND WITHOUT STATUS MIGRAINOSUS, NOT INTRACTABLE: ICD-10-CM

## 2017-10-30 DIAGNOSIS — J15.7 PNEUMONIA OF RIGHT LOWER LOBE DUE TO MYCOPLASMA PNEUMONIAE: Primary | ICD-10-CM

## 2017-10-30 DIAGNOSIS — F33.1 MODERATE EPISODE OF RECURRENT MAJOR DEPRESSIVE DISORDER (HCC): ICD-10-CM

## 2017-10-30 PROCEDURE — 99213 OFFICE O/P EST LOW 20 MIN: CPT | Performed by: FAMILY MEDICINE

## 2017-10-30 RX ORDER — TRAZODONE HYDROCHLORIDE 50 MG/1
TABLET ORAL
Qty: 60 TABLET | Refills: 2 | Status: SHIPPED | OUTPATIENT
Start: 2017-10-30 | End: 2017-12-28

## 2017-10-30 RX ORDER — AZITHROMYCIN 250 MG/1
TABLET, FILM COATED ORAL
Qty: 6 TABLET | Refills: 0 | Status: SHIPPED | OUTPATIENT
Start: 2017-10-30 | End: 2017-11-14

## 2017-10-30 RX ORDER — HYDROXYCHLOROQUINE SULFATE 200 MG/1
200 TABLET, FILM COATED ORAL DAILY
Refills: 2 | COMMUNITY
Start: 2017-10-20 | End: 2018-02-20 | Stop reason: SDUPTHER

## 2017-10-30 RX ORDER — SERTRALINE HYDROCHLORIDE 100 MG/1
100 TABLET, FILM COATED ORAL DAILY
Qty: 30 TABLET | Refills: 2 | Status: SHIPPED | OUTPATIENT
Start: 2017-10-30 | End: 2018-01-25 | Stop reason: SDUPTHER

## 2017-10-30 NOTE — TELEPHONE ENCOUNTER
----- Message from Vanna Nicolas sent at 10/27/2017  3:39 PM EDT -----  Regarding: Prescription Question  Contact: [phone]  I need refills called in for Sertraline 100 mg.,and Trazodone Hcl 50 mg.

## 2017-10-30 NOTE — PROGRESS NOTES
Office visit notes from 5-25-17 faxed again to Piter at [employer] at [phone]. This was faxed last week, did not go through.

## 2017-11-01 ENCOUNTER — TELEPHONE (OUTPATIENT)
Dept: INTERNAL MEDICINE | Facility: CLINIC | Age: 42
End: 2017-11-01

## 2017-11-01 NOTE — TELEPHONE ENCOUNTER
Kayla with [employer] called concerning patient trying to file a claim. She states that it was denied because she didn't file the claim until July 2017.   You can reach Kayla at [phone].

## 2017-11-04 ENCOUNTER — APPOINTMENT (OUTPATIENT)
Dept: GENERAL RADIOLOGY | Facility: HOSPITAL | Age: 42
End: 2017-11-04

## 2017-11-04 ENCOUNTER — HOSPITAL ENCOUNTER (EMERGENCY)
Facility: HOSPITAL | Age: 42
Discharge: HOME | End: 2017-11-04
Attending: EMERGENCY MEDICINE | Admitting: EMERGENCY MEDICINE

## 2017-11-04 VITALS
TEMPERATURE: 97.8 F | WEIGHT: 200 LBS | HEART RATE: 92 BPM | OXYGEN SATURATION: 96 % | SYSTOLIC BLOOD PRESSURE: 138 MMHG | DIASTOLIC BLOOD PRESSURE: 91 MMHG | RESPIRATION RATE: 16 BRPM | BODY MASS INDEX: 33.32 KG/M2 | HEIGHT: 65 IN

## 2017-11-04 DIAGNOSIS — R07.89 RIGHT-SIDED CHEST WALL PAIN: Primary | ICD-10-CM

## 2017-11-04 PROCEDURE — 99283 EMERGENCY DEPT VISIT LOW MDM: CPT

## 2017-11-04 PROCEDURE — 71010 HC CHEST PA OR AP: CPT

## 2017-11-04 PROCEDURE — 25010000002 ORPHENADRINE CITRATE PER 60 MG: Performed by: NURSE PRACTITIONER

## 2017-11-04 PROCEDURE — 93005 ELECTROCARDIOGRAM TRACING: CPT

## 2017-11-04 PROCEDURE — 25010000002 KETOROLAC TROMETHAMINE PER 15 MG: Performed by: NURSE PRACTITIONER

## 2017-11-04 PROCEDURE — 96372 THER/PROPH/DIAG INJ SC/IM: CPT

## 2017-11-04 PROCEDURE — 71101 X-RAY EXAM UNILAT RIBS/CHEST: CPT

## 2017-11-04 RX ORDER — IBUPROFEN 800 MG/1
800 TABLET ORAL
Qty: 21 TABLET | Refills: 0 | Status: SHIPPED | OUTPATIENT
Start: 2017-11-04 | End: 2017-12-12

## 2017-11-04 RX ORDER — KETOROLAC TROMETHAMINE 30 MG/ML
60 INJECTION, SOLUTION INTRAMUSCULAR; INTRAVENOUS ONCE
Status: COMPLETED | OUTPATIENT
Start: 2017-11-04 | End: 2017-11-04

## 2017-11-04 RX ORDER — CYCLOBENZAPRINE HCL 10 MG
10 TABLET ORAL 2 TIMES DAILY PRN
Qty: 14 TABLET | Refills: 0 | Status: SHIPPED | OUTPATIENT
Start: 2017-11-04 | End: 2017-12-07

## 2017-11-04 RX ORDER — ORPHENADRINE CITRATE 30 MG/ML
60 INJECTION INTRAMUSCULAR; INTRAVENOUS ONCE
Status: COMPLETED | OUTPATIENT
Start: 2017-11-04 | End: 2017-11-04

## 2017-11-04 RX ADMIN — KETOROLAC TROMETHAMINE 60 MG: 60 INJECTION, SOLUTION INTRAMUSCULAR at 22:22

## 2017-11-04 RX ADMIN — ORPHENADRINE CITRATE 60 MG: 30 INJECTION INTRAMUSCULAR; INTRAVENOUS at 22:24

## 2017-11-06 NOTE — TELEPHONE ENCOUNTER
Spoke with Kayla. She advised that claim was denied in July 2017. They received office notes from us and again denied claim. They felt the symptoms she had could have been related to other conditions/reasons. They notified her immediately on 9-26-17 when it was denied. She advised pt can file an appeal if she wants to pursue it further.

## 2017-11-06 NOTE — ED PROVIDER NOTES
Subjective   History of Present Illness  42-year-old female presents with complaints of right lateral rib pain.  She's had a cough and upper respiratory infection for a couple weeks and coughed very hard today and felt a sudden pain in her right ribs.  She is crying and tearful.  They did not try any medication at home.  He does smoke daily.  Review of Systems   All other systems reviewed and are negative.      Past Medical History:   Diagnosis Date   • Hypertension    • Malignant melanoma     stage 1, left leg   • Sleep apnea        Allergies   Allergen Reactions   • Morphine And Related Nausea And Vomiting   • Penicillins    • Tetracyclines & Related        Past Surgical History:   Procedure Laterality Date   • SKIN CANCER EXCISION     • TOTAL ABDOMINAL HYSTERECTOMY WITH SALPINGO OOPHORECTOMY  2004    ? Hormonal migraines       Family History   Problem Relation Age of Onset   • Rheum arthritis Mother    • Ulcerative colitis Mother    • COPD Father    • Crohn's disease Sister    • No Known Problems Maternal Aunt    • No Known Problems Maternal Uncle    • No Known Problems Paternal Aunt    • No Known Problems Paternal Uncle    • Kidney disease Maternal Grandmother    • Alzheimer's disease Maternal Grandfather    • No Known Problems Paternal Grandmother    • No Known Problems Paternal Grandfather    • Breast cancer Neg Hx    • Ovarian cancer Neg Hx        Social History     Social History   • Marital status: Single     Spouse name: N/A   • Number of children: N/A   • Years of education: N/A     Social History Main Topics   • Smoking status: Current Every Day Smoker     Packs/day: 1.00     Years: 8.00     Types: Cigarettes     Last attempt to quit: 2/1/2016   • Smokeless tobacco: Never Used      Comment: trying to quit again   • Alcohol use No   • Drug use: No   • Sexual activity: Yes     Partners: Male     Other Topics Concern   • None     Social History Narrative           Objective   Physical Exam   Constitutional:  She is oriented to person, place, and time. She appears well-developed and well-nourished.   HENT:   Head: Normocephalic and atraumatic.   Eyes: Conjunctivae are normal. Pupils are equal, round, and reactive to light.   Neck: Normal range of motion. Neck supple.   Cardiovascular: Normal rate, regular rhythm, normal heart sounds and intact distal pulses.  Exam reveals no gallop and no friction rub.    No murmur heard.  Pulmonary/Chest: Effort normal.   Abdominal: Soft. Bowel sounds are normal.   Musculoskeletal: Normal range of motion.   Neurological: She is alert and oriented to person, place, and time. She has normal reflexes.   Skin: Skin is warm.   Psychiatric: She has a normal mood and affect. Her behavior is normal. Thought content normal.   Nursing note and vitals reviewed.      Procedures         ED Course  ED Course   Comment By Time   EKG demonstrated normal sinus rhythm.  No acute changes.  Ribs and chest x-ray are negative for any acute abnormality.  She was given Toradol 60 mg IM and Norflex 60 mg IM with some relief of her discomfort. Angelicemory Curry, APRN 11/05 2317      Discharged with cyclobenzaprine 10 mg 1 twice a day as needed for pain, he has Neurontin at home, and Motrin at home.  I encouraged her to do some deep breathing and coughing daily.  If her symptoms worsen or do not improve she should return.  Indication            MDM    Final diagnoses:   Right-sided chest wall pain            Angelic King Patricio, APRN  11/05/17 0259

## 2017-11-14 ENCOUNTER — OFFICE VISIT (OUTPATIENT)
Dept: INTERNAL MEDICINE | Facility: CLINIC | Age: 42
End: 2017-11-14

## 2017-11-14 VITALS
DIASTOLIC BLOOD PRESSURE: 80 MMHG | SYSTOLIC BLOOD PRESSURE: 122 MMHG | OXYGEN SATURATION: 91 % | HEIGHT: 65 IN | RESPIRATION RATE: 12 BRPM | HEART RATE: 115 BPM | WEIGHT: 206.2 LBS | BODY MASS INDEX: 34.35 KG/M2

## 2017-11-14 DIAGNOSIS — J15.9 RECURRENT BACTERIAL PNEUMONIA: Primary | ICD-10-CM

## 2017-11-14 DIAGNOSIS — R09.1 PLEURISY WITHOUT EFFUSION: ICD-10-CM

## 2017-11-14 PROCEDURE — 99214 OFFICE O/P EST MOD 30 MIN: CPT | Performed by: FAMILY MEDICINE

## 2017-11-14 RX ORDER — LEVOFLOXACIN 500 MG/1
500 TABLET, FILM COATED ORAL DAILY
Qty: 7 TABLET | Refills: 0 | Status: SHIPPED | OUTPATIENT
Start: 2017-11-14 | End: 2017-12-07

## 2017-11-14 RX ORDER — GUAIFENESIN AND CODEINE PHOSPHATE 100; 10 MG/5ML; MG/5ML
5 SOLUTION ORAL NIGHTLY PRN
Qty: 180 ML | Refills: 0 | Status: SHIPPED | OUTPATIENT
Start: 2017-11-14 | End: 2017-12-07

## 2017-11-14 RX ORDER — PREDNISONE 10 MG/1
TABLET ORAL
Qty: 63 TABLET | Refills: 0 | Status: SHIPPED | OUTPATIENT
Start: 2017-11-14 | End: 2017-12-07

## 2017-11-14 NOTE — PROGRESS NOTES
C/O cough over one month. She went to ER on 11-4-17 with sharp lower right side pain, thought she had fx'd a rib. They told her she pulled the cartilage away from the rib. That has gotten a little better. Now has pain on upper left side rad around front under breasts around to back. No pain with deep breath, but pain with change in position, coughing, and certain movements/stretching or reaching. Pain is worse in the mornings. No SOA, no fever. She still has prod cough with yellow/green sputum.     SUBJECTIVE: April Judy Nicolas is a 42 y.o. female seen for a follow up visit;    Pneumonia  Patient complains of evaluation of cough, possible bronchitis, evaluation of chest pain, follow up on pneumonia. Symptoms include: shortness of breath on exertion, bilateral lower back pain, pleuritic chest pain, cough, fatigue, malaise and sputum production. Symptoms began 6 weeks ago, and have been unchanged since that time. Patient denies dyspnea. Treatment thus far includes azithromycin, steroids. Past pulmonary history is significant for occasional episodes of bronchitis.  Having chest pain w/ deep breaths on b/l sides, no SOB at rest, no dizziness or lightheadedness.  Denies night sweats and fevers.             The following portions of the patient's history were reviewed and updated as appropriate: She  has a past medical history of Hypertension; Malignant melanoma; and Sleep apnea.  She has Moderate episode of recurrent major depressive disorder; Migraine with aura and without status migrainosus, not intractable; Anxiety; and Surgical menopause on hormone replacement therapy on her pertinent problem list.  She  has a past surgical history that includes Skin cancer excision and Total abdominal hysterectomy w/ bilateral salpingoophorectomy (2004).  Her family history includes Alzheimer's disease in her maternal grandfather; COPD in her father; Crohn's disease in her sister; Kidney disease in her maternal grandmother; No  "Known Problems in her maternal aunt, maternal uncle, paternal aunt, paternal grandfather, paternal grandmother, and paternal uncle; Rheum arthritis in her mother; Ulcerative colitis in her mother. There is no history of Breast cancer or Ovarian cancer.  She  reports that she has been smoking Cigarettes.  She has a 8.00 pack-year smoking history. She has never used smokeless tobacco. She reports that she does not drink alcohol or use illicit drugs.  She has a current medication list which includes the following prescription(s): albuterol, cyclobenzaprine, diclofenac, estradiol, flunisolide, furosemide, gabapentin, hydroxychloroquine, ibuprofen, potassium chloride, pramipexole, promethazine, sertraline, trazodone, triamterene-hydrochlorothiazide, cyclobenzaprine, guaifenesin-codeine, ibuprofen, levofloxacin, and prednisone..    Review of Systems   Constitutional: Positive for fatigue. Negative for chills and fever.   Respiratory: Positive for cough and wheezing.    Cardiovascular: Positive for chest pain and leg swelling. Negative for palpitations.   Neurological: Negative.          OBJECTIVE:  /80  Pulse 111  Resp 12  Ht 65\" (165.1 cm)  Wt 206 lb 3.2 oz (93.5 kg)  SpO2 96%  BMI 34.31 kg/m2   /80  Pulse 115  Resp 12  Ht 65\" (165.1 cm)  Wt 206 lb 3.2 oz (93.5 kg)  SpO2 91% Comment: while walking  BMI 34.31 kg/m2      Physical Exam   Constitutional: She appears well-developed and well-nourished. No distress.   HENT:   Head: Normocephalic and atraumatic.   Right Ear: Tympanic membrane, external ear and ear canal normal.   Left Ear: Tympanic membrane, external ear and ear canal normal.   Nose: Mucosal edema and rhinorrhea present.   Mouth/Throat: No uvula swelling. Posterior oropharyngeal erythema present. No tonsillar abscesses. No tonsillar exudate.   Eyes: Conjunctivae are normal. Pupils are equal, round, and reactive to light.   Cardiovascular: Regular rhythm.  Tachycardia present.    No murmur " heard.  Cap refill 3 seconds   Pulmonary/Chest: She has decreased breath sounds.   Prolonged expiratory phase           ASSESSMENT:   Diagnosis Plan   1. Recurrent bacterial pneumonia  levoFLOXacin (LEVAQUIN) 500 MG tablet    predniSONE (DELTASONE) 10 MG tablet    guaifenesin-codeine (GUAIFENESIN AC) 100-10 MG/5ML liquid   2. Pleurisy without effusion  predniSONE (DELTASONE) 10 MG tablet    guaifenesin-codeine (GUAIFENESIN AC) 100-10 MG/5ML liquid       Pt advised that PE is unlikely given her productive cough and her bronchitis type symptoms, however if she worsens and becomes short of breath or doesn't improve in next few days will likely have to get a CT chest to r/o PE.  D-dimer could help if negative, but with pt's background inflammatory disease it will very likely be positive.

## 2017-11-29 ENCOUNTER — OFFICE VISIT (OUTPATIENT)
Dept: INTERNAL MEDICINE | Facility: CLINIC | Age: 42
End: 2017-11-29

## 2017-11-29 VITALS
OXYGEN SATURATION: 97 % | HEART RATE: 95 BPM | BODY MASS INDEX: 34.99 KG/M2 | WEIGHT: 210 LBS | DIASTOLIC BLOOD PRESSURE: 78 MMHG | SYSTOLIC BLOOD PRESSURE: 122 MMHG | TEMPERATURE: 98.1 F | HEIGHT: 65 IN

## 2017-11-29 DIAGNOSIS — R09.1 PLEURISY WITHOUT EFFUSION: Primary | ICD-10-CM

## 2017-11-29 DIAGNOSIS — J15.9 RECURRENT BACTERIAL PNEUMONIA: ICD-10-CM

## 2017-11-29 PROCEDURE — 99213 OFFICE O/P EST LOW 20 MIN: CPT | Performed by: PHYSICIAN ASSISTANT

## 2017-11-29 NOTE — PROGRESS NOTES
Subjective     Chief Complaint: pain with coughing    History of Present Illness     April Judy Nicolas is a 42 y.o. female presenting with complaints of cough and pleuritic pain on the right side. She was seen by Dr. Fuentes on 11/14/17 and diagnosed with pneumonia. She was prescribed a 5 day course of levaquin and prednisone taper. Also given guaifenesin-codeine syrup for nighttime use. She completed the course of antibiotics and currently has 2 days left of the steroid taper. She's still using the cough syrup at night. Overall, the patient feels much better today than she did several weeks prior. She is coughing much less, but still experiencing pleuritic pain on the right side with cough. She cannot sleep on this side at night because of the pain. Pain is worse in the morning but improves some with ibuprofen. Also takes flexeril daily. No pain at rest or with deep breathing, just with cough and position changes. No pain on left side. Denies shortness of breath or wheezing. Denies hemoptysis. Denies fever, chills, facial pain, ear pain, sore throat, body aches, palpitations, N/V/D.    No further complaints today.    Past Medical History:   Diagnosis Date   • Hypertension    • Malignant melanoma     stage 1, left leg   • Sleep apnea        Allergies   Allergen Reactions   • Morphine And Related Nausea And Vomiting   • Penicillins    • Tetracyclines & Related        The following portions of the patient's history were reviewed and updated as appropriate: allergies, current medications and past medical history.    Review of Systems:   Constitutional: Fatigue.  Negative for appetite change, chills, fatigue, fever and unexpected weight change.   HENT: Negative for ear pain, nosebleeds, rhinorrhea, congestion, sore throat, and trouble swallowing.    Eyes: Negative for photophobia, discharge and visual disturbance.   Respiratory: Cough, pleuritic pain. Negative for chest tightness, shortness of breath and wheezing.   "  Cardiovascular: Negative for chest pain, palpitations and leg swelling.   Gastrointestinal: Negative for abdominal distention, abdominal pain, blood in stool, constipation, diarrhea, nausea and vomiting.   Genitourinary: Negative for dysuria, hematuria, urinary incontinence.  Musculoskeletal: Negative for arthralgias, back pain, joint swelling, myalgias, neck pain and neck stiffness.   Skin: Negative for color change, pallor, and rash.   Neurological: Negative for dizziness, tremors, seizures, weakness, numbness and headaches.   Hematological/ Lymphatic: Negative for adenopathy. Does not bruise/bleed easily.   Psychiatric/Behavioral: Negative for sleep disturbances, agitation, behavioral problems, confusion, hallucinations, self-injury and suicidal ideas. The patient is not nervous/anxious.      Objective     Vitals:    11/29/17 1051   BP: 122/78   Pulse: 95   Temp: 98.1 °F (36.7 °C)   SpO2: 97%   Weight: 210 lb (95.3 kg)   Height: 65\" (165.1 cm)       Physical Exam   Constitutional: She is oriented to person, place, and time. Vital signs are normal. She appears well-developed and well-nourished. No distress.   HENT:   Head: Normocephalic and atraumatic.   Right Ear: Tympanic membrane and external ear normal.   Left Ear: Tympanic membrane and external ear normal.   Nose: Rhinorrhea present. Right sinus exhibits no maxillary sinus tenderness and no frontal sinus tenderness. Left sinus exhibits no maxillary sinus tenderness and no frontal sinus tenderness.   Mouth/Throat: Posterior oropharyngeal erythema present. No oropharyngeal exudate.   Eyes: EOM are normal. Pupils are equal, round, and reactive to light.   Neck: Normal range of motion. Neck supple.   Cardiovascular: Normal rate, regular rhythm, normal heart sounds and intact distal pulses.    Pulmonary/Chest: Effort normal and breath sounds normal. No respiratory distress. She has no wheezes. She has no rales. She exhibits tenderness.   Abdominal: Soft. Bowel " sounds are normal. There is no tenderness.   Musculoskeletal: Normal range of motion.   Lymphadenopathy:     She has no cervical adenopathy.   Neurological: She is alert and oriented to person, place, and time.   Skin: Skin is warm and dry. No rash noted.   Psychiatric: She has a normal mood and affect. Her behavior is normal.   Nursing note and vitals reviewed.       Assessment/Plan     Diagnoses and all orders for this visit:    Pleurisy without effusion  - finish steroid taper as prescribed  - continue using guaifenesin-codeine syrup at night if needed for rest/ cough  - continue ibuprofen PRN pain       Recurrent bacterial pneumonia, resolving  - finished course of antibiotics  - vitals stable on exam today, lungs clear      Patient was again advised that PE is unlikely given her productive cough and her bronchitis type symptoms, lack of SOA, normal heart rate, pain localized to area of resolving pneumonia, lack of hemoptysis. Overall patient feeling much better. However if she worsens significantly, pain becomes severe, has any episodes of coughing up blood, or becomes acutely short of breath, she is advised to go to the ER. Patient verbalized understanding.    Follow up with Dr. Fuentes as scheduled.    Karlee Hilario PA-C  11/29/2017

## 2017-12-06 ENCOUNTER — TELEPHONE (OUTPATIENT)
Dept: INTERNAL MEDICINE | Facility: CLINIC | Age: 42
End: 2017-12-06

## 2017-12-06 NOTE — TELEPHONE ENCOUNTER
DR HAMMOND SAID SHE WILL SEE THIS PT TOMORROW @ 11:30. SHE NEEDS TO HAVE SHORT TERM DISABILITY PAPERS FILLED OUT.     DR HAMMOND TOLD ME TO LET YOU KNOW.

## 2017-12-07 ENCOUNTER — OFFICE VISIT (OUTPATIENT)
Dept: INTERNAL MEDICINE | Facility: CLINIC | Age: 42
End: 2017-12-07

## 2017-12-07 VITALS
HEIGHT: 65 IN | RESPIRATION RATE: 12 BRPM | HEART RATE: 115 BPM | WEIGHT: 206 LBS | OXYGEN SATURATION: 97 % | SYSTOLIC BLOOD PRESSURE: 114 MMHG | BODY MASS INDEX: 34.32 KG/M2 | DIASTOLIC BLOOD PRESSURE: 80 MMHG

## 2017-12-07 DIAGNOSIS — M79.7 FIBROMYALGIA: ICD-10-CM

## 2017-12-07 DIAGNOSIS — G62.89 OTHER POLYNEUROPATHY: ICD-10-CM

## 2017-12-07 DIAGNOSIS — R09.1 PLEURISY WITHOUT EFFUSION: ICD-10-CM

## 2017-12-07 DIAGNOSIS — G43.119 INTRACTABLE MIGRAINE WITH AURA WITHOUT STATUS MIGRAINOSUS: Primary | ICD-10-CM

## 2017-12-07 PROCEDURE — 96372 THER/PROPH/DIAG INJ SC/IM: CPT | Performed by: FAMILY MEDICINE

## 2017-12-07 PROCEDURE — 99213 OFFICE O/P EST LOW 20 MIN: CPT | Performed by: FAMILY MEDICINE

## 2017-12-07 RX ORDER — GABAPENTIN 600 MG/1
600 TABLET ORAL 3 TIMES DAILY PRN
Qty: 90 TABLET | Refills: 1 | Status: SHIPPED | OUTPATIENT
Start: 2017-12-07 | End: 2018-01-25 | Stop reason: SDUPTHER

## 2017-12-07 RX ORDER — KETOROLAC TROMETHAMINE 30 MG/ML
30 INJECTION, SOLUTION INTRAMUSCULAR; INTRAVENOUS ONCE
Status: COMPLETED | OUTPATIENT
Start: 2017-12-07 | End: 2017-12-07

## 2017-12-07 RX ORDER — VALPROIC ACID 250 MG/1
250 CAPSULE, LIQUID FILLED ORAL 2 TIMES DAILY
Qty: 60 CAPSULE | Refills: 1 | Status: SHIPPED | OUTPATIENT
Start: 2017-12-07 | End: 2017-12-28

## 2017-12-07 RX ADMIN — KETOROLAC TROMETHAMINE 30 MG: 30 INJECTION, SOLUTION INTRAMUSCULAR; INTRAVENOUS at 13:51

## 2017-12-12 ENCOUNTER — OFFICE VISIT (OUTPATIENT)
Dept: ORTHOPEDIC SURGERY | Facility: CLINIC | Age: 42
End: 2017-12-12

## 2017-12-12 VITALS — WEIGHT: 211.9 LBS | HEIGHT: 65 IN | BODY MASS INDEX: 35.31 KG/M2 | RESPIRATION RATE: 16 BRPM

## 2017-12-12 DIAGNOSIS — G56.03 BILATERAL CARPAL TUNNEL SYNDROME: Primary | ICD-10-CM

## 2017-12-12 DIAGNOSIS — M25.531 RIGHT WRIST PAIN: Primary | ICD-10-CM

## 2017-12-12 PROCEDURE — 99204 OFFICE O/P NEW MOD 45 MIN: CPT | Performed by: ORTHOPAEDIC SURGERY

## 2017-12-12 NOTE — PROGRESS NOTES
Subjective   Patient ID: April Judy Nicolas is a 42 y.o. female  Pain, Numbness, and Edema of the Left Wrist (Patient denies any injury. Patient states has been experiencing bilateral numbness, pain, and swelling since May of 2017. Patient is right hand dominant.) and Pain, Edema, and Numbness of the Right Wrist             History of Present Illness    Right-hand dominant  pain in right wrist and numbness both hands since May of this year.  Diagnosed with inflammatory condition and under care by Dr. Anderson currently receiving Plaquenil.  Mildly elevated CRP with other sero-markers of inflammatory disease negative in September.  Scheduled for follow-up in 3 months.  Denies acute fall or injury to either hand.  Tenderness occurs on the right wrist volarly over the radial side flexor carpi radialis tendon sheath with small ganglion cyst that been there since earlier this year, numbness in both hands median nerve distribution constant not relieve the splinting at night and difficulty with use repetitively doing  work she was recently moved to a non- position.    Review of Systems   Constitutional: Negative for diaphoresis, fever and unexpected weight change.   HENT: Negative for dental problem and sore throat.    Eyes: Negative for visual disturbance.   Respiratory: Negative for shortness of breath.    Cardiovascular: Negative for chest pain.   Gastrointestinal: Negative for abdominal pain, constipation, diarrhea, nausea and vomiting.   Genitourinary: Negative for difficulty urinating and frequency.   Musculoskeletal: Positive for arthralgias.   Neurological: Negative for headaches.   Hematological: Does not bruise/bleed easily.   All other systems reviewed and are negative.      Past Medical History:   Diagnosis Date   • Hypertension    • Malignant melanoma     stage 1, left leg   • Sleep apnea         Past Surgical History:   Procedure Laterality Date   • SKIN CANCER EXCISION     • TOTAL  "ABDOMINAL HYSTERECTOMY WITH SALPINGO OOPHORECTOMY  2004    ? Hormonal migraines       Family History   Problem Relation Age of Onset   • Rheum arthritis Mother    • Ulcerative colitis Mother    • COPD Father    • Crohn's disease Sister    • No Known Problems Maternal Aunt    • No Known Problems Maternal Uncle    • No Known Problems Paternal Aunt    • No Known Problems Paternal Uncle    • Kidney disease Maternal Grandmother    • Alzheimer's disease Maternal Grandfather    • No Known Problems Paternal Grandmother    • No Known Problems Paternal Grandfather    • Breast cancer Neg Hx    • Ovarian cancer Neg Hx        Social History     Social History   • Marital status: Single     Spouse name: N/A   • Number of children: N/A   • Years of education: N/A     Occupational History   • Not on file.     Social History Main Topics   • Smoking status: Current Every Day Smoker     Packs/day: 1.00     Years: 8.00     Types: Cigarettes     Last attempt to quit: 2/1/2016   • Smokeless tobacco: Never Used      Comment: trying to quit again   • Alcohol use No   • Drug use: No   • Sexual activity: Yes     Partners: Male     Other Topics Concern   • Not on file     Social History Narrative       I have reviewed all of the above social hx, family hx, surgical hx, medications, allergies & ROS and confirm that it is accurate.    Allergies   Allergen Reactions   • Morphine And Related Nausea And Vomiting   • Penicillins    • Tetracyclines & Related        Objective   Resp 16  Ht 165.1 cm (65\")  Wt 96.1 kg (211 lb 14.4 oz)  BMI 35.26 kg/m2   Physical Exam  Constitutional: Patient is oriented to person, place, and time. Patient appears well-developed and well-nourished.   HENT:Head: Normocephalic and atraumatic.   Eyes: EOM are normal. Pupils are equal, round, and reactive to light.   Neck: Normal range of motion. Neck supple.   Cardiovascular: Normal rate.    Pulmonary/Chest: Effort normal and breath sounds normal.   Abdominal: Soft. "   Neurological: Patient is alert and oriented to person, place, and time.   Skin: Skin is warm and dry.   Psychiatric: Patient has a normal mood and affect.   Nursing note and vitals reviewed.       Ortho Exam   Right hand with tenderness swelling over the flexor carpal radialis tendon sheath, several small ganglions present, full wrist range of motion no focal anatomic snuffbox tenderness negative finklestein maneuver no triggering in any fingers no palpable dorsal cysts at the wrist joint radial ulnar and supination pronation normal, normal circulation negative Phi testing.    Left hand with slight tenderness over the flex carpi radialis tendon sheath but no palpable ganglion cysts, full wrist range of motion negative for instability impingement or sign of anatomic snuffbox tenderness.    Both hands demonstrate positive Tinel's Phalen's and carpal compression tests over the median nerve at the carpal tunnels, mid forearm volarly and proximally no tenderness over the median nerve distribution in the forearm or elbow, elbows with bilateral symmetric full painless range of motion    Assessment/Plan   Review of Radiographic Studies:    Radiographic images today of affected area I personally viewed and showed no sign of acute fracture or dislocation.      Procedures     April was seen today for pain, numbness, edema, pain, edema and numbness.    Diagnoses and all orders for this visit:    Bilateral carpal tunnel syndrome     Risk, benefits, and merits of treatment alternatives reviewed with the patient and questions answered and Use brace as instructed      Recommendations/Plan:   Test/Studies: EMG study both hands    Patient agreeable to call or return sooner for any concerns.             Impression:  History of inflammatory markers undergoing care with Dr. Anderson rheumatology with plaquenil, right worse than left hand numbness probable carpal tunnel syndrome, right wrist ganglion cysts, bilateral shoulder and  bilateral knee pain.  Plan:  EMG studies both hands, discuss carpal tunnel release pending findings, continue medical management with Dr. Anderson

## 2017-12-21 DIAGNOSIS — E89.40 SURGICAL MENOPAUSE ON HORMONE REPLACEMENT THERAPY: ICD-10-CM

## 2017-12-21 DIAGNOSIS — Z79.890 SURGICAL MENOPAUSE ON HORMONE REPLACEMENT THERAPY: ICD-10-CM

## 2017-12-21 RX ORDER — ESTRADIOL 1 MG/1
TABLET ORAL
Qty: 30 TABLET | Refills: 1 | Status: SHIPPED | OUTPATIENT
Start: 2017-12-21 | End: 2017-12-28 | Stop reason: SDUPTHER

## 2017-12-27 ENCOUNTER — PROCEDURE VISIT (OUTPATIENT)
Dept: ORTHOPEDIC SURGERY | Facility: CLINIC | Age: 42
End: 2017-12-27

## 2017-12-27 DIAGNOSIS — G56.03 CARPAL TUNNEL SYNDROME, BILATERAL: ICD-10-CM

## 2017-12-27 DIAGNOSIS — R20.2 PARESTHESIA: ICD-10-CM

## 2017-12-27 PROCEDURE — 95911 NRV CNDJ TEST 9-10 STUDIES: CPT | Performed by: PHYSICAL THERAPIST

## 2017-12-27 PROCEDURE — 95886 MUSC TEST DONE W/N TEST COMP: CPT | Performed by: PHYSICAL THERAPIST

## 2017-12-28 ENCOUNTER — OFFICE VISIT (OUTPATIENT)
Dept: INTERNAL MEDICINE | Facility: CLINIC | Age: 42
End: 2017-12-28

## 2017-12-28 VITALS
HEART RATE: 84 BPM | SYSTOLIC BLOOD PRESSURE: 122 MMHG | BODY MASS INDEX: 36.85 KG/M2 | OXYGEN SATURATION: 98 % | WEIGHT: 221.2 LBS | HEIGHT: 65 IN | RESPIRATION RATE: 12 BRPM | DIASTOLIC BLOOD PRESSURE: 80 MMHG

## 2017-12-28 DIAGNOSIS — R60.0 BILATERAL LOWER EXTREMITY EDEMA: ICD-10-CM

## 2017-12-28 DIAGNOSIS — R00.2 PALPITATIONS: ICD-10-CM

## 2017-12-28 DIAGNOSIS — Z79.890 SURGICAL MENOPAUSE ON HORMONE REPLACEMENT THERAPY: ICD-10-CM

## 2017-12-28 DIAGNOSIS — E89.40 SURGICAL MENOPAUSE ON HORMONE REPLACEMENT THERAPY: ICD-10-CM

## 2017-12-28 DIAGNOSIS — G43.119 INTRACTABLE MIGRAINE WITH AURA WITHOUT STATUS MIGRAINOSUS: Primary | ICD-10-CM

## 2017-12-28 DIAGNOSIS — G56.03 CARPAL TUNNEL SYNDROME ON BOTH SIDES: ICD-10-CM

## 2017-12-28 DIAGNOSIS — Z23 FLU VACCINE NEED: ICD-10-CM

## 2017-12-28 DIAGNOSIS — R79.82 ELEVATED C-REACTIVE PROTEIN (CRP): ICD-10-CM

## 2017-12-28 DIAGNOSIS — I10 ESSENTIAL HYPERTENSION: ICD-10-CM

## 2017-12-28 DIAGNOSIS — G25.81 RESTLESS LEG SYNDROME: ICD-10-CM

## 2017-12-28 PROCEDURE — 90471 IMMUNIZATION ADMIN: CPT | Performed by: FAMILY MEDICINE

## 2017-12-28 PROCEDURE — 90686 IIV4 VACC NO PRSV 0.5 ML IM: CPT | Performed by: FAMILY MEDICINE

## 2017-12-28 PROCEDURE — 99214 OFFICE O/P EST MOD 30 MIN: CPT | Performed by: FAMILY MEDICINE

## 2017-12-28 RX ORDER — PRAMIPEXOLE DIHYDROCHLORIDE 1.5 MG/1
1.5 TABLET ORAL NIGHTLY
Qty: 30 TABLET | Refills: 2 | Status: SHIPPED | OUTPATIENT
Start: 2017-12-28 | End: 2018-04-05 | Stop reason: SDUPTHER

## 2017-12-28 RX ORDER — ESTRADIOL 1 MG/1
1 TABLET ORAL DAILY
Qty: 30 TABLET | Refills: 2 | Status: SHIPPED | OUTPATIENT
Start: 2017-12-28 | End: 2018-04-26 | Stop reason: SDUPTHER

## 2017-12-28 RX ORDER — TEMAZEPAM 30 MG/1
30 CAPSULE ORAL NIGHTLY PRN
Qty: 30 CAPSULE | Refills: 1 | Status: SHIPPED | OUTPATIENT
Start: 2017-12-28 | End: 2018-03-02 | Stop reason: SDUPTHER

## 2017-12-28 RX ORDER — DIVALPROEX SODIUM 500 MG/1
500 TABLET, EXTENDED RELEASE ORAL NIGHTLY
Qty: 30 TABLET | Refills: 1 | Status: SHIPPED | OUTPATIENT
Start: 2017-12-28 | End: 2018-01-25 | Stop reason: SDUPTHER

## 2018-01-03 LAB
BASOPHILS # BLD AUTO: 0.1 X10E3/UL (ref 0–0.2)
BASOPHILS NFR BLD AUTO: 1 %
BUN SERPL-MCNC: 17 MG/DL (ref 6–24)
BUN/CREAT SERPL: 26 (ref 9–23)
C-ANCA TITR SER IF: ABNORMAL TITER
CALCIUM SERPL-MCNC: 9.1 MG/DL (ref 8.7–10.2)
CENTROMERE B AB SER-ACNC: <0.2 AI (ref 0–0.9)
CHLORIDE SERPL-SCNC: 102 MMOL/L (ref 96–106)
CHROMATIN AB SERPL-ACNC: <0.2 AI (ref 0–0.9)
CK SERPL-CCNC: 65 U/L (ref 24–173)
CO2 SERPL-SCNC: 28 MMOL/L (ref 18–29)
CREAT SERPL-MCNC: 0.65 MG/DL (ref 0.57–1)
CRP SERPL-MCNC: 5.9 MG/L (ref 0–4.9)
DSDNA AB SER-ACNC: <1 IU/ML (ref 0–9)
ENA JO1 AB SER-ACNC: <0.2 AI (ref 0–0.9)
ENA RNP AB SER-ACNC: <0.2 AI (ref 0–0.9)
ENA SCL70 AB SER-ACNC: <0.2 AI (ref 0–0.9)
ENA SM AB SER-ACNC: <0.2 AI (ref 0–0.9)
ENA SM+RNP AB SER-ACNC: <0.2 AI (ref 0–0.9)
ENA SS-A AB SER-ACNC: 0.2 AI (ref 0–0.9)
ENA SS-B AB SER-ACNC: <0.2 AI (ref 0–0.9)
EOSINOPHIL # BLD AUTO: 0.3 X10E3/UL (ref 0–0.4)
EOSINOPHIL NFR BLD AUTO: 3 %
ERYTHROCYTE [DISTWIDTH] IN BLOOD BY AUTOMATED COUNT: 13.3 % (ref 12.3–15.4)
GLUCOSE SERPL-MCNC: 94 MG/DL (ref 65–99)
HCT VFR BLD AUTO: 42.2 % (ref 34–46.6)
HGB BLD-MCNC: 14.5 G/DL (ref 11.1–15.9)
HLA-B27 QL NAA+PROBE: NEGATIVE
IMM GRANULOCYTES # BLD: 0.1 X10E3/UL (ref 0–0.1)
IMM GRANULOCYTES NFR BLD: 1 %
LYMPHOCYTES # BLD AUTO: 1.7 X10E3/UL (ref 0.7–3.1)
LYMPHOCYTES NFR BLD AUTO: 18 %
Lab: NORMAL
MCH RBC QN AUTO: 30.4 PG (ref 26.6–33)
MCHC RBC AUTO-ENTMCNC: 34.4 G/DL (ref 31.5–35.7)
MCV RBC AUTO: 89 FL (ref 79–97)
MONOCYTES # BLD AUTO: 0.9 X10E3/UL (ref 0.1–0.9)
MONOCYTES NFR BLD AUTO: 9 %
MYELOPEROXIDASE AB SER IA-ACNC: <9 U/ML (ref 0–9)
NEUTROPHILS # BLD AUTO: 6.6 X10E3/UL (ref 1.4–7)
NEUTROPHILS NFR BLD AUTO: 68 %
P-ANCA ATYPICAL TITR SER IF: ABNORMAL TITER
P-ANCA TITR SER IF: ABNORMAL TITER
PLATELET # BLD AUTO: 235 X10E3/UL (ref 150–379)
POTASSIUM SERPL-SCNC: 4.4 MMOL/L (ref 3.5–5.2)
PROTEINASE3 AB SER IA-ACNC: <3.5 U/ML (ref 0–3.5)
RBC # BLD AUTO: 4.77 X10E6/UL (ref 3.77–5.28)
RIBOSOMAL P AB SER-ACNC: <0.2 AI (ref 0–0.9)
SODIUM SERPL-SCNC: 143 MMOL/L (ref 134–144)
WBC # BLD AUTO: 9.6 X10E3/UL (ref 3.4–10.8)

## 2018-01-04 ENCOUNTER — OFFICE VISIT (OUTPATIENT)
Dept: ORTHOPEDIC SURGERY | Facility: CLINIC | Age: 43
End: 2018-01-04

## 2018-01-04 ENCOUNTER — HOSPITAL ENCOUNTER (OUTPATIENT)
Dept: GENERAL RADIOLOGY | Facility: HOSPITAL | Age: 43
Discharge: HOME | End: 2018-01-04
Admitting: ORTHOPAEDIC SURGERY

## 2018-01-04 ENCOUNTER — APPOINTMENT (OUTPATIENT)
Dept: PREADMISSION TESTING | Facility: HOSPITAL | Age: 43
End: 2018-01-04

## 2018-01-04 VITALS — HEIGHT: 65 IN | WEIGHT: 220 LBS | BODY MASS INDEX: 36.65 KG/M2

## 2018-01-04 VITALS — HEIGHT: 65 IN | RESPIRATION RATE: 16 BRPM | WEIGHT: 220 LBS | BODY MASS INDEX: 36.65 KG/M2

## 2018-01-04 DIAGNOSIS — G56.01 CARPAL TUNNEL SYNDROME OF RIGHT WRIST: ICD-10-CM

## 2018-01-04 DIAGNOSIS — G56.01 CARPAL TUNNEL SYNDROME OF RIGHT WRIST: Primary | ICD-10-CM

## 2018-01-04 LAB
BILIRUB UR QL STRIP: NEGATIVE
CLARITY UR: CLEAR
COLOR UR: YELLOW
GLUCOSE UR STRIP-MCNC: NEGATIVE MG/DL
HGB UR QL STRIP.AUTO: NEGATIVE
KETONES UR QL STRIP: NEGATIVE
LEUKOCYTE ESTERASE UR QL STRIP.AUTO: NEGATIVE
NITRITE UR QL STRIP: NEGATIVE
PH UR STRIP.AUTO: 5.5 [PH] (ref 5–8)
PROT UR QL STRIP: NEGATIVE
SP GR UR STRIP: >=1.03 (ref 1–1.03)
UROBILINOGEN UR QL STRIP: NORMAL

## 2018-01-04 PROCEDURE — 87081 CULTURE SCREEN ONLY: CPT | Performed by: ORTHOPAEDIC SURGERY

## 2018-01-04 PROCEDURE — 71046 X-RAY EXAM CHEST 2 VIEWS: CPT

## 2018-01-04 PROCEDURE — 99214 OFFICE O/P EST MOD 30 MIN: CPT | Performed by: ORTHOPAEDIC SURGERY

## 2018-01-04 PROCEDURE — 81003 URINALYSIS AUTO W/O SCOPE: CPT | Performed by: ORTHOPAEDIC SURGERY

## 2018-01-04 RX ORDER — POTASSIUM CHLORIDE 20 MEQ/1
20 TABLET, EXTENDED RELEASE ORAL DAILY PRN
COMMUNITY
End: 2020-09-16

## 2018-01-04 RX ORDER — FUROSEMIDE 20 MG/1
20 TABLET ORAL DAILY PRN
COMMUNITY
End: 2020-09-16

## 2018-01-04 NOTE — PROGRESS NOTES
Subjective   Patient ID: April Judy Nicolas is a 42 y.o. female  Numbness, Pain, and Results of the Right Hand (EMG results) and Numbness, Pain, and Results of the Left Hand (EMG results)             History of Present Illness    Continue numbness tingling pain right wrist, recent EMG study confirm conduction delay consistent with carpal tunnel syndrome right hand, left hand negative carpal tunnel syndrome findings on EMG.  Slight swelling distal radius on the volar aspect consistent with ganglion cyst persists minimally painful.  Loss of sensation median nerve distribution, denies elbow shoulder pain neck pain.    Patient has history of migraine headaches was taking lots of Excedrin developed palpitations underwent workup with Dr. chand the summer with Holter monitoring and cardiac evaluation all negative and discharge from his care.  Since that episode this summer she's had no recurrence of palpitations chest pain or other cardiac issues.        Review of Systems   Constitutional: Negative for diaphoresis, fever and unexpected weight change.   HENT: Negative for dental problem and sore throat.    Eyes: Negative for visual disturbance.   Respiratory: Negative for shortness of breath.    Cardiovascular: Negative for chest pain.   Gastrointestinal: Negative for abdominal pain, constipation, diarrhea, nausea and vomiting.   Genitourinary: Negative for difficulty urinating and frequency.   Musculoskeletal: Positive for arthralgias.   Neurological: Negative for headaches.   Hematological: Does not bruise/bleed easily.   All other systems reviewed and are negative.      Past Medical History:   Diagnosis Date   • Hypertension    • Malignant melanoma     stage 1, left leg   • Sleep apnea         Past Surgical History:   Procedure Laterality Date   • SKIN CANCER EXCISION     • TOTAL ABDOMINAL HYSTERECTOMY WITH SALPINGO OOPHORECTOMY  2004    ? Hormonal migraines       Family History   Problem Relation Age of Onset   •  "Rheum arthritis Mother    • Ulcerative colitis Mother    • COPD Father    • Crohn's disease Sister    • No Known Problems Maternal Aunt    • No Known Problems Maternal Uncle    • No Known Problems Paternal Aunt    • No Known Problems Paternal Uncle    • Kidney disease Maternal Grandmother    • Alzheimer's disease Maternal Grandfather    • No Known Problems Paternal Grandmother    • No Known Problems Paternal Grandfather    • Breast cancer Neg Hx    • Ovarian cancer Neg Hx        Social History     Social History   • Marital status: Single     Spouse name: N/A   • Number of children: N/A   • Years of education: N/A     Occupational History   • Not on file.     Social History Main Topics   • Smoking status: Current Every Day Smoker     Packs/day: 1.00     Years: 8.00     Types: Cigarettes     Last attempt to quit: 2/1/2016   • Smokeless tobacco: Never Used      Comment: trying to quit again   • Alcohol use No   • Drug use: No   • Sexual activity: Yes     Partners: Male     Other Topics Concern   • Not on file     Social History Narrative       I have reviewed all of the above social hx, family hx, surgical hx, medications, allergies & ROS and confirm that it is accurate.    Allergies   Allergen Reactions   • Morphine And Related Nausea And Vomiting   • Penicillins    • Tetracyclines & Related        Objective   Resp 16  Ht 165.1 cm (65\")  Wt 99.8 kg (220 lb)  BMI 36.61 kg/m2   Physical Exam   Cardiovascular: Regular rhythm and normal heart sounds.    Pulmonary/Chest: Effort normal and breath sounds normal.   Musculoskeletal:        Right wrist: She exhibits tenderness.     Constitutional: Patient is oriented to person, place, and time. Patient appears well-developed and well-nourished.   HENT:Head: Normocephalic and atraumatic.   Eyes: EOM are normal. Pupils are equal, round, and reactive to light.   Neck: Normal range of motion. Neck supple.   Cardiovascular: Normal rate.    Pulmonary/Chest: Effort normal and " breath sounds normal.   Abdominal: Soft.   Neurological: Patient is alert and oriented to person, place, and time.   Skin: Skin is warm and dry.   Psychiatric: Patient has a normal mood and affect.   Nursing note and vitals reviewed.       Ortho Exam   Right hand with positive Tinel's and Phalen sign over the median nerve, slight swelling of the distal flexor current radialis tendon sheath consistent with small ganglion but no erythema minimal tenderness full wrist range of motion no carpal instability no anatomic snuffbox tenderness good capillary refill all fingers and normal circulation of the hand.    Assessment/Plan   Review of Radiographic Studies:    No new imaging done today.      Procedures     April was seen today for numbness, pain, results, numbness, pain and results.    Diagnoses and all orders for this visit:    Carpal tunnel syndrome of right wrist     Orthopedic activities reviewed and patient expressed appreciation, Risk, benefits, and merits of treatment alternatives reviewed with the patient and questions answered and The nature of the proposed surgery reviewed with the patient including risks, benefits, rehabilitation, recovery timeframe, and outcome expectations      Recommendations/Plan:   Exercise, medications, injections, other patient advice, and return appointment as noted.  Surgery: Right carpal tunnel release    Patient agreeable to call or return sooner for any concerns.       I discussed with the patient the diagnosis, condition, natural history and treatment alternatives, both surgical and nonsurgical.  I reviewed the surgical procedural details using models, diagrams and reviewing x-ray findings.  I explained the nature of the operation, anesthesia methods and the postoperative recovery.  I explained risks and complications including but not limited to infection, bleeding, blood clotting, poor healing, chronic pain, stiffness, failure of the procedure, possible recurrence of the  condition and anesthetic related risks.  The patient had opportunity to ask questions which were all answered to their satisfaction and decided to proceed with the plan for surgery.  I believe informed consent to proceed with the surgery was given verbally in my presence today.  The surgical consent form will be signed in the presence of the nursing staff prior to the surgery.      Impression:  Right hand carpal tunnel syndrome, small minimally painful ganglion cyst flexor carpal radialis tendon sheath  Plan:  Right hand carpal tunnel release

## 2018-01-07 LAB — MRSA SPEC QL CULT: NO GROWTH

## 2018-01-09 ENCOUNTER — ANESTHESIA EVENT (OUTPATIENT)
Dept: PERIOP | Facility: HOSPITAL | Age: 43
End: 2018-01-09

## 2018-01-10 ENCOUNTER — ANESTHESIA (OUTPATIENT)
Dept: PERIOP | Facility: HOSPITAL | Age: 43
End: 2018-01-10

## 2018-01-10 ENCOUNTER — HOSPITAL ENCOUNTER (OUTPATIENT)
Facility: HOSPITAL | Age: 43
Setting detail: HOSPITAL OUTPATIENT SURGERY
Discharge: HOME | End: 2018-01-10
Attending: ORTHOPAEDIC SURGERY | Admitting: ORTHOPAEDIC SURGERY

## 2018-01-10 VITALS
RESPIRATION RATE: 20 BRPM | DIASTOLIC BLOOD PRESSURE: 79 MMHG | OXYGEN SATURATION: 97 % | SYSTOLIC BLOOD PRESSURE: 123 MMHG | TEMPERATURE: 98.2 F | HEART RATE: 79 BPM

## 2018-01-10 DIAGNOSIS — G56.01 CARPAL TUNNEL SYNDROME OF RIGHT WRIST: ICD-10-CM

## 2018-01-10 PROCEDURE — 25010000002 PROPOFOL 200 MG/20ML EMULSION: Performed by: NURSE ANESTHETIST, CERTIFIED REGISTERED

## 2018-01-10 PROCEDURE — 25010000002 KETOROLAC TROMETHAMINE PER 15 MG: Performed by: NURSE ANESTHETIST, CERTIFIED REGISTERED

## 2018-01-10 PROCEDURE — 64721 CARPAL TUNNEL SURGERY: CPT | Performed by: ORTHOPAEDIC SURGERY

## 2018-01-10 PROCEDURE — 25010000002 MIDAZOLAM PER 1 MG: Performed by: NURSE ANESTHETIST, CERTIFIED REGISTERED

## 2018-01-10 RX ORDER — KETOROLAC TROMETHAMINE 30 MG/ML
INJECTION, SOLUTION INTRAMUSCULAR; INTRAVENOUS AS NEEDED
Status: DISCONTINUED | OUTPATIENT
Start: 2018-01-10 | End: 2018-01-10 | Stop reason: SURG

## 2018-01-10 RX ORDER — ACETAMINOPHEN 325 MG/1
650 TABLET ORAL ONCE
Status: COMPLETED | OUTPATIENT
Start: 2018-01-10 | End: 2018-01-10

## 2018-01-10 RX ORDER — HYDROCODONE BITARTRATE AND ACETAMINOPHEN 5; 325 MG/1; MG/1
1 TABLET ORAL ONCE AS NEEDED
Status: CANCELLED | OUTPATIENT
Start: 2018-01-10 | End: 2018-01-20

## 2018-01-10 RX ORDER — OXYCODONE AND ACETAMINOPHEN 10; 325 MG/1; MG/1
TABLET ORAL
Status: COMPLETED
Start: 2018-01-10 | End: 2018-01-10

## 2018-01-10 RX ORDER — MEPERIDINE HYDROCHLORIDE 50 MG/ML
INJECTION INTRAMUSCULAR; INTRAVENOUS; SUBCUTANEOUS AS NEEDED
Status: DISCONTINUED | OUTPATIENT
Start: 2018-01-10 | End: 2018-01-10 | Stop reason: SURG

## 2018-01-10 RX ORDER — SODIUM CHLORIDE, SODIUM LACTATE, POTASSIUM CHLORIDE, CALCIUM CHLORIDE 600; 310; 30; 20 MG/100ML; MG/100ML; MG/100ML; MG/100ML
1000 INJECTION, SOLUTION INTRAVENOUS CONTINUOUS PRN
Status: DISCONTINUED | OUTPATIENT
Start: 2018-01-10 | End: 2018-01-10 | Stop reason: HOSPADM

## 2018-01-10 RX ORDER — PROPOFOL 10 MG/ML
INJECTION, EMULSION INTRAVENOUS AS NEEDED
Status: DISCONTINUED | OUTPATIENT
Start: 2018-01-10 | End: 2018-01-10 | Stop reason: SURG

## 2018-01-10 RX ORDER — SODIUM CHLORIDE 0.9 % (FLUSH) 0.9 %
3 SYRINGE (ML) INJECTION AS NEEDED
Status: DISCONTINUED | OUTPATIENT
Start: 2018-01-10 | End: 2018-01-10 | Stop reason: HOSPADM

## 2018-01-10 RX ORDER — BUPIVACAINE HYDROCHLORIDE 5 MG/ML
INJECTION, SOLUTION EPIDURAL; INTRACAUDAL AS NEEDED
Status: DISCONTINUED | OUTPATIENT
Start: 2018-01-10 | End: 2018-01-10 | Stop reason: HOSPADM

## 2018-01-10 RX ORDER — HYDROCODONE BITARTRATE AND ACETAMINOPHEN 5; 325 MG/1; MG/1
1-2 TABLET ORAL EVERY 6 HOURS PRN
Qty: 20 TABLET | Refills: 0 | Status: SHIPPED | OUTPATIENT
Start: 2018-01-10 | End: 2018-02-22

## 2018-01-10 RX ORDER — ACETAMINOPHEN 325 MG/1
TABLET ORAL
Status: COMPLETED
Start: 2018-01-10 | End: 2018-01-10

## 2018-01-10 RX ORDER — OXYCODONE AND ACETAMINOPHEN 10; 325 MG/1; MG/1
1 TABLET ORAL ONCE
Status: COMPLETED | OUTPATIENT
Start: 2018-01-10 | End: 2018-01-10

## 2018-01-10 RX ORDER — CLINDAMYCIN PHOSPHATE 900 MG/50ML
900 INJECTION, SOLUTION INTRAVENOUS
Status: COMPLETED | OUTPATIENT
Start: 2018-01-10 | End: 2018-01-10

## 2018-01-10 RX ORDER — LIDOCAINE HYDROCHLORIDE 10 MG/ML
INJECTION, SOLUTION INFILTRATION; PERINEURAL AS NEEDED
Status: DISCONTINUED | OUTPATIENT
Start: 2018-01-10 | End: 2018-01-10 | Stop reason: HOSPADM

## 2018-01-10 RX ORDER — MIDAZOLAM HYDROCHLORIDE 1 MG/ML
INJECTION INTRAMUSCULAR; INTRAVENOUS AS NEEDED
Status: DISCONTINUED | OUTPATIENT
Start: 2018-01-10 | End: 2018-01-10 | Stop reason: SURG

## 2018-01-10 RX ADMIN — OXYCODONE HYDROCHLORIDE AND ACETAMINOPHEN 1 TABLET: 10; 325 TABLET ORAL at 09:00

## 2018-01-10 RX ADMIN — MIDAZOLAM HYDROCHLORIDE 2 MG: 1 INJECTION, SOLUTION INTRAMUSCULAR; INTRAVENOUS at 09:30

## 2018-01-10 RX ADMIN — OXYCODONE AND ACETAMINOPHEN 1 TABLET: 10; 325 TABLET ORAL at 09:00

## 2018-01-10 RX ADMIN — SODIUM CHLORIDE, POTASSIUM CHLORIDE, SODIUM LACTATE AND CALCIUM CHLORIDE 1000 ML: 600; 310; 30; 20 INJECTION, SOLUTION INTRAVENOUS at 09:02

## 2018-01-10 RX ADMIN — CLINDAMYCIN PHOSPHATE 900 MG: 900 INJECTION, SOLUTION INTRAVENOUS at 09:27

## 2018-01-10 RX ADMIN — FAMOTIDINE 20 MG: 10 INJECTION, SOLUTION INTRAVENOUS at 09:12

## 2018-01-10 RX ADMIN — ACETAMINOPHEN 650 MG: 325 TABLET ORAL at 09:00

## 2018-01-10 RX ADMIN — MEPERIDINE HYDROCHLORIDE 50 MG: 50 INJECTION INTRAMUSCULAR; INTRAVENOUS; SUBCUTANEOUS at 09:30

## 2018-01-10 RX ADMIN — PROPOFOL 50 MG: 10 INJECTION, EMULSION INTRAVENOUS at 09:34

## 2018-01-10 RX ADMIN — ACETAMINOPHEN 650 MG: 325 TABLET, FILM COATED ORAL at 09:00

## 2018-01-10 RX ADMIN — KETOROLAC TROMETHAMINE 30 MG: 30 INJECTION, SOLUTION INTRAMUSCULAR at 09:30

## 2018-01-10 NOTE — ANESTHESIA PREPROCEDURE EVALUATION
Anesthesia Evaluation     Patient summary reviewed and Nursing notes reviewed   no history of anesthetic complications:  NPO Solid Status: > 8 hours  NPO Liquid Status: > 8 hours     Airway   Mallampati: II  TM distance: >3 FB  Neck ROM: full  no difficulty expected  Dental - normal exam     Pulmonary - normal exam    breath sounds clear to auscultation  (+) a smoker Current, shortness of breath, sleep apnea on CPAP,   Cardiovascular - normal exam    Rhythm: regular  Rate: normal    (+) hypertension, hyperlipidemia      Neuro/Psych  (+) headaches, psychiatric history (OCD) Anxiety and Depression,     GI/Hepatic/Renal/Endo    (+) obesity,  GERD,     Musculoskeletal     (+) arthralgias,   Abdominal    Substance History - negative use     OB/GYN negative ob/gyn ROS         Other - negative ROS                                             Anesthesia Plan    ASA 3     MAC   (Pt told that intravenous sedation will be used as the primary anesthetic along with local anesthesia if necessary. Every effort will be made to make sure the patient is comfortable.     The patient was told they may or may not have recall for the procedure. It was further explained that if the MAC was not adequate that a general anesthetic with either an LMA or endotracheal tube would be required.   Will proceed with the plan of care.)  intravenous induction   Anesthetic plan and risks discussed with patient.

## 2018-01-10 NOTE — OP NOTE
[address], [address]  [place]  [address] [phone]      OPERATIVE REPORT           PATIENT NAME:   Vanna Nicolas                            YOB: 1975      PREOP DIAGNOSIS:             Right Carpal Tunnel Syndrome.    POSTOP DIAGNOSIS:             Right Carpal Tunnel Syndrome.    PROCEDURE:     Right Carpal Tunnel Release (cpt 38341)    SURGEON:    Gus Ramos M.D.    OPERATIVE TEAM:   Circulator: Jaswinder Medrano RN  Scrub Person: Gaye Ledesma CSA    ANESTHESIA:   Local with sedation    ESTIM BLOOD LOSS:  0 cc    TOURNIQUET TIME:  8  minutes @ 250 mm Hg    SPECIMENS:    None.    DRAINS:    None.    COMPLICATIONS:     None.    DISPOSITION:     Stable to recovery.    INDICATIONS AND NARRATIVE:   The patient presents for planned elective carpal tunnel release surgery to address the ongoing wrist and hand condition.  Risks and benefits of the surgery were discussed and an informed consent obtained.  Risks were discussed including, but not limited to, anesthesia risks, infection, nerve/vessel/tendon injury, chronic pain, persistent symptoms or possible recurrence of symptoms or limitations of the wrist and hand.  Goals include pain relief, improved sensation, strength, mobility and potential for improved function of the wrist and hand but no guarantee made as to these outcomes.      OPERATIVE PROCEDURE:  Antibiotic prophylaxis given.  Surgeon site marking and a time out were performed.  Anesthesia was effective and well tolerated.  The arm and hand was prepped and draped in the usual sterile fashion.      After sterile prep and drape and  arm exsanguination and with tourniquet inflation, an incision was made along the ulna border of the thenar crease of the hand in line with the radial border of the ring finger.  Careful soft tissue dissection and blunt Ragnell retractors were used to protect the soft tissues and expose the transverse carpal  tunnel ligament and protecting the superficial palmar arch.  While keeping a blunt freer under the ligament, the ligament was released proximally and distally, decompressing the median nerve and flexor tendons, taking care to identify and protect the recurrent branch of the median nerve and the superficial palmar arch with loupe magnification. All structures within the carpal tunnel could be easily visualized, and were intact within the carpal tunnel after release of the transverse carpal ligament. The wound was irrigated copiously and skin closure consisted of running 4-0 nylon sutures..The tourniquet was taken down and there was excellent hemostasis with bipolar electrocautery during the procedure and prompt return of normal perfusion at the conclusion of the procedure.  A sterile adaptic padded gauze, soft roll and two inch ace wrap forearm based dressing was applied.  Anesthesia was effective and well tolerated.  There were no complications of the procedure.  The patient was transferred in stable condition to the recovery room.

## 2018-01-10 NOTE — PLAN OF CARE
Problem: Perioperative Period (Adult)  Goal: Signs and Symptoms of Listed Potential Problems Will be Absent or Manageable (Perioperative Period)   01/10/18 0826   Perioperative Period   Problems Assessed (Perioperative Period) all   Problems Present (Perioperative Period) none

## 2018-01-10 NOTE — ANESTHESIA POSTPROCEDURE EVALUATION
Patient: April Judy Nicolas    Procedure Summary     Date Anesthesia Start Anesthesia Stop Room / Location    01/10/18 0929 [address] [facility] OR 4 /  [facility]       Procedure Diagnosis Surgeon Provider    ELECTIVE RIGHT CARPAL TUNNEL RELEASE (Right Wrist) Carpal tunnel syndrome of right wrist  (Carpal tunnel syndrome of right wrist [G56.01]) MD Rip Mosley CRNA          Anesthesia Type: MAC  Last vitals  BP   114/76   Temp   98.2   Pulse   84   Resp   16    SpO2   94     Post Anesthesia Care and Evaluation    Patient location during evaluation: bedside  Patient participation: complete - patient participated  Level of consciousness: awake and awake and alert  Pain score: 0  Pain management: satisfactory to patient  Airway patency: patent  Anesthetic complications: No anesthetic complications  PONV Status: none  Cardiovascular status: acceptable and stable  Respiratory status: acceptable, spontaneous ventilation, room air and nonlabored ventilation  Hydration status: acceptable

## 2018-01-18 ENCOUNTER — OFFICE VISIT (OUTPATIENT)
Dept: ORTHOPEDIC SURGERY | Facility: CLINIC | Age: 43
End: 2018-01-18

## 2018-01-18 VITALS — WEIGHT: 200 LBS | BODY MASS INDEX: 33.32 KG/M2 | HEIGHT: 65 IN | RESPIRATION RATE: 18 BRPM

## 2018-01-18 DIAGNOSIS — G56.01 CARPAL TUNNEL SYNDROME OF RIGHT WRIST: Primary | ICD-10-CM

## 2018-01-18 PROCEDURE — 99024 POSTOP FOLLOW-UP VISIT: CPT | Performed by: ORTHOPAEDIC SURGERY

## 2018-01-18 NOTE — PROGRESS NOTES
Subjective   Patient ID: Vanna Nicolas is a 42 y.o. female  Pain and Post-op of the Right Hand (Right hand CTR on 01/10/18 some discomfort still with certain movements and we will remove sutures today.) and Suture / Staple Removal             History of Present Illness    Patient doing very well no incisional issues feeling returning to fingers    Review of Systems   Constitutional: Negative for fever.   HENT: Negative for voice change.    Eyes: Negative for visual disturbance.   Respiratory: Negative for shortness of breath.    Cardiovascular: Negative for chest pain.   Gastrointestinal: Negative for abdominal distention and abdominal pain.   Genitourinary: Negative for dysuria.   Musculoskeletal: Positive for arthralgias. Negative for gait problem and joint swelling.   Skin: Negative for rash.   Neurological: Negative for speech difficulty.   Hematological: Does not bruise/bleed easily.   Psychiatric/Behavioral: Negative for confusion.       Past Medical History:   Diagnosis Date   • Anxiety and depression    • Body piercing     EARS BILATERAL   • Bursitis    • Chest pain     WITH EXCEDRINE TOO MUCH CAFFEINE   • Fibromyalgia    • GERD (gastroesophageal reflux disease)    • Hernia, umbilical    • High cholesterol    • Hypertension    • Knee pain, bilateral    • Malignant melanoma     stage 1, left leg   • Migraines    • OCD (obsessive compulsive disorder)    • PTSD (post-traumatic stress disorder)    • Sleep apnea     CPAP USAGE   • Wears glasses         Past Surgical History:   Procedure Laterality Date   • CARPAL TUNNEL RELEASE Right 1/10/2018    Procedure: ELECTIVE RIGHT CARPAL TUNNEL RELEASE;  Surgeon: Gus Ramos MD;  Location: [facility];  Service:    • SKIN BIOPSY      STAGE 1 LEFT THIGH   • SKIN CANCER EXCISION     • TOTAL ABDOMINAL HYSTERECTOMY WITH SALPINGO OOPHORECTOMY  2004    ? Hormonal migraines       Family History   Problem Relation Age of Onset   • Rheum arthritis Mother    • Ulcerative  "colitis Mother    • COPD Father    • Crohn's disease Sister    • No Known Problems Maternal Aunt    • No Known Problems Maternal Uncle    • No Known Problems Paternal Aunt    • No Known Problems Paternal Uncle    • Kidney disease Maternal Grandmother    • Alzheimer's disease Maternal Grandfather    • No Known Problems Paternal Grandmother    • No Known Problems Paternal Grandfather    • Breast cancer Neg Hx    • Ovarian cancer Neg Hx        Social History     Social History   • Marital status: Single     Spouse name: N/A   • Number of children: N/A   • Years of education: N/A     Occupational History   • Not on file.     Social History Main Topics   • Smoking status: Current Every Day Smoker     Packs/day: 1.00     Years: 9.00     Types: Cigarettes   • Smokeless tobacco: Never Used   • Alcohol use No   • Drug use: No   • Sexual activity: Defer     Other Topics Concern   • Not on file     Social History Narrative       I have reviewed all of the above social hx, family hx, surgical hx, medications, allergies & ROS and confirm that it is accurate.    Allergies   Allergen Reactions   • Morphine And Related Nausea And Vomiting   • Penicillins    • Tetracyclines & Related        Objective   Resp 18  Ht 165.1 cm (65\")  Wt 90.7 kg (200 lb)  BMI 33.28 kg/m2   Physical Exam  Constitutional: Patient is oriented to person, place, and time. Patient appears well-developed and well-nourished.   HENT:Head: Normocephalic and atraumatic.   Eyes: EOM are normal. Pupils are equal, round, and reactive to light.   Neck: Normal range of motion. Neck supple.   Cardiovascular: Normal rate.    Pulmonary/Chest: Effort normal and breath sounds normal.   Abdominal: Soft.   Neurological: Patient is alert and oriented to person, place, and time.   Skin: Skin is warm and dry.   Psychiatric: Patient has a normal mood and affect.   Nursing note and vitals reviewed.       Ortho Exam     Right-hand sutures removed Steri-Strips applied fingers with " improved sensation no erythema around social area full wrist range of motion full finger range of motion  Assessment/Plan   Review of Radiographic Studies:    No new imaging done today.      Procedures     April was seen today for suture / staple removal, pain and post-op.    Diagnoses and all orders for this visit:    Carpal tunnel syndrome of right wrist     Work status form completed and provided to patient      Recommendations/Plan:   Work/Activity Status: Unable to return to work until next evaluation    Patient agreeable to call or return sooner for any concerns.             Impression:  Status post right hand carpal tunnel release  Plan:  Remain out of work until next visit which time anticipate release to regular work duties as a

## 2018-01-23 ENCOUNTER — OFFICE VISIT (OUTPATIENT)
Dept: ORTHOPEDIC SURGERY | Facility: CLINIC | Age: 43
End: 2018-01-23

## 2018-01-23 VITALS — HEIGHT: 65 IN | WEIGHT: 203.3 LBS | RESPIRATION RATE: 16 BRPM | BODY MASS INDEX: 33.87 KG/M2

## 2018-01-23 DIAGNOSIS — G56.01 CARPAL TUNNEL SYNDROME OF RIGHT WRIST: Primary | ICD-10-CM

## 2018-01-23 PROCEDURE — 99024 POSTOP FOLLOW-UP VISIT: CPT | Performed by: ORTHOPAEDIC SURGERY

## 2018-01-23 NOTE — PROGRESS NOTES
Subjective   Patient ID: Vanna Nicolas is a 42 y.o. female  Post-op Problem of the Right Wrist (Incision site re-opened.)             History of Present Illness    Patient concerned about her incision widening no drainage or erythema pain seems to localize more the radial side of the distal forearm but no swelling or redness in that area.    Review of Systems   Constitutional: Negative for diaphoresis, fever and unexpected weight change.   HENT: Negative for dental problem and sore throat.    Eyes: Negative for visual disturbance.   Respiratory: Negative for shortness of breath.    Cardiovascular: Negative for chest pain.   Gastrointestinal: Negative for abdominal pain, constipation, diarrhea, nausea and vomiting.   Genitourinary: Negative for difficulty urinating and frequency.   Musculoskeletal: Positive for arthralgias.   Neurological: Negative for headaches.   Hematological: Does not bruise/bleed easily.   All other systems reviewed and are negative.      Past Medical History:   Diagnosis Date   • Anxiety and depression    • Body piercing     EARS BILATERAL   • Bursitis    • Chest pain     WITH EXCEDRINE TOO MUCH CAFFEINE   • Fibromyalgia    • GERD (gastroesophageal reflux disease)    • Hernia, umbilical    • High cholesterol    • Hypertension    • Knee pain, bilateral    • Malignant melanoma     stage 1, left leg   • Migraines    • OCD (obsessive compulsive disorder)    • PTSD (post-traumatic stress disorder)    • Sleep apnea     CPAP USAGE   • Wears glasses         Past Surgical History:   Procedure Laterality Date   • CARPAL TUNNEL RELEASE Right 1/10/2018    Procedure: ELECTIVE RIGHT CARPAL TUNNEL RELEASE;  Surgeon: Gus Ramos MD;  Location: [facility];  Service:    • SKIN BIOPSY      STAGE 1 LEFT THIGH   • SKIN CANCER EXCISION     • TOTAL ABDOMINAL HYSTERECTOMY WITH SALPINGO OOPHORECTOMY  2004    ? Hormonal migraines       Family History   Problem Relation Age of Onset   • Rheum arthritis Mother   "  • Ulcerative colitis Mother    • COPD Father    • Crohn's disease Sister    • No Known Problems Maternal Aunt    • No Known Problems Maternal Uncle    • No Known Problems Paternal Aunt    • No Known Problems Paternal Uncle    • Kidney disease Maternal Grandmother    • Alzheimer's disease Maternal Grandfather    • No Known Problems Paternal Grandmother    • No Known Problems Paternal Grandfather    • Breast cancer Neg Hx    • Ovarian cancer Neg Hx        Social History     Social History   • Marital status: Single     Spouse name: N/A   • Number of children: N/A   • Years of education: N/A     Occupational History   • Not on file.     Social History Main Topics   • Smoking status: Current Every Day Smoker     Packs/day: 1.00     Years: 9.00     Types: Cigarettes   • Smokeless tobacco: Never Used   • Alcohol use No   • Drug use: No   • Sexual activity: Defer     Other Topics Concern   • Not on file     Social History Narrative       I have reviewed all of the above social hx, family hx, surgical hx, medications, allergies & ROS and confirm that it is accurate.    Allergies   Allergen Reactions   • Morphine And Related Nausea And Vomiting   • Penicillins    • Tetracyclines & Related        Objective   Resp 16  Ht 165.1 cm (65\")  Wt 92.2 kg (203 lb 4.8 oz)  BMI 33.83 kg/m2   Physical Exam  Constitutional: Patient is oriented to person, place, and time. Patient appears well-developed and well-nourished.   HENT:Head: Normocephalic and atraumatic.   Eyes: EOM are normal. Pupils are equal, round, and reactive to light.   Neck: Normal range of motion. Neck supple.   Cardiovascular: Normal rate.    Pulmonary/Chest: Effort normal and breath sounds normal.   Abdominal: Soft.   Neurological: Patient is alert and oriented to person, place, and time.   Skin: Skin is warm and dry.   Psychiatric: Patient has a normal mood and affect.   Nursing note and vitals reviewed.       Ortho Exam   Right hand carpal tunnel incision " slightly widened but no erythema no drainage no sign of significant full-thickness dehiscence hand neurovascularly intact, very slight tenderness over the flexor carpal radialis tendon sheath but no redness swelling or pain with passive stretch.    Assessment/Plan   Review of Radiographic Studies:    No new imaging done today.      Procedures     April was seen today for post-op problem.    Diagnoses and all orders for this visit:    Carpal tunnel syndrome of right wrist     Orthopedic activities reviewed and patient expressed appreciation      Recommendations/Plan:   Work/Activity Status: Unable to return to work until next evaluation    Patient agreeable to call or return sooner for any concerns.       Patient instructed on wound care and reinforcement as necessary with Steri-Strips and Mastisol, no sign of infection no need for antibiotics at this time      Impression:  Status post work-related right hand carpal tunnel syndrome status post surgical release  Plan:  Local wound care follow-up as scheduled consider release to work at that time

## 2018-01-25 ENCOUNTER — OFFICE VISIT (OUTPATIENT)
Dept: INTERNAL MEDICINE | Facility: CLINIC | Age: 43
End: 2018-01-25

## 2018-01-25 VITALS
WEIGHT: 226.6 LBS | DIASTOLIC BLOOD PRESSURE: 80 MMHG | RESPIRATION RATE: 12 BRPM | HEIGHT: 65 IN | BODY MASS INDEX: 37.75 KG/M2 | OXYGEN SATURATION: 97 % | SYSTOLIC BLOOD PRESSURE: 130 MMHG | HEART RATE: 98 BPM

## 2018-01-25 DIAGNOSIS — Z83.79 FAMILY HISTORY OF CROHN'S DISEASE: ICD-10-CM

## 2018-01-25 DIAGNOSIS — Z83.79 FAMILY HISTORY OF ULCERATIVE COLITIS: ICD-10-CM

## 2018-01-25 DIAGNOSIS — M79.7 FIBROMYALGIA: ICD-10-CM

## 2018-01-25 DIAGNOSIS — R19.7 DIARRHEA, UNSPECIFIED TYPE: ICD-10-CM

## 2018-01-25 DIAGNOSIS — F33.1 MODERATE EPISODE OF RECURRENT MAJOR DEPRESSIVE DISORDER (HCC): ICD-10-CM

## 2018-01-25 DIAGNOSIS — G43.109 MIGRAINE WITH AURA AND WITHOUT STATUS MIGRAINOSUS, NOT INTRACTABLE: Primary | ICD-10-CM

## 2018-01-25 DIAGNOSIS — R76.8 P-ANCA TITER POSITIVE: ICD-10-CM

## 2018-01-25 DIAGNOSIS — R79.82 ELEVATED C-REACTIVE PROTEIN (CRP): ICD-10-CM

## 2018-01-25 PROCEDURE — 99213 OFFICE O/P EST LOW 20 MIN: CPT | Performed by: FAMILY MEDICINE

## 2018-01-25 RX ORDER — SERTRALINE HYDROCHLORIDE 100 MG/1
100 TABLET, FILM COATED ORAL DAILY
Qty: 30 TABLET | Refills: 2 | Status: SHIPPED | OUTPATIENT
Start: 2018-01-25 | End: 2018-05-08 | Stop reason: SDUPTHER

## 2018-01-25 RX ORDER — DIVALPROEX SODIUM 500 MG/1
500 TABLET, EXTENDED RELEASE ORAL NIGHTLY
Qty: 30 TABLET | Refills: 1 | Status: SHIPPED | OUTPATIENT
Start: 2018-01-25 | End: 2018-03-01 | Stop reason: SDUPTHER

## 2018-01-25 RX ORDER — GABAPENTIN 600 MG/1
600 TABLET ORAL 3 TIMES DAILY PRN
Qty: 90 TABLET | Refills: 1 | Status: SHIPPED | OUTPATIENT
Start: 2018-01-25 | End: 2018-06-19 | Stop reason: SDUPTHER

## 2018-01-25 RX ORDER — BUTORPHANOL TARTRATE 10 MG/ML
1 SPRAY, METERED NASAL DAILY PRN
Qty: 2.5 ML | Refills: 0 | Status: SHIPPED | OUTPATIENT
Start: 2018-01-25 | End: 2018-03-01

## 2018-02-01 ENCOUNTER — OFFICE VISIT (OUTPATIENT)
Dept: ORTHOPEDIC SURGERY | Facility: CLINIC | Age: 43
End: 2018-02-01

## 2018-02-01 VITALS — WEIGHT: 229.6 LBS | RESPIRATION RATE: 16 BRPM | HEIGHT: 65 IN | BODY MASS INDEX: 38.25 KG/M2

## 2018-02-01 DIAGNOSIS — IMO0002 BURSITIS/TENDONITIS, SHOULDER: Primary | ICD-10-CM

## 2018-02-01 DIAGNOSIS — G56.01 CARPAL TUNNEL SYNDROME OF RIGHT WRIST: ICD-10-CM

## 2018-02-01 PROCEDURE — 99213 OFFICE O/P EST LOW 20 MIN: CPT | Performed by: ORTHOPAEDIC SURGERY

## 2018-02-01 PROCEDURE — 99024 POSTOP FOLLOW-UP VISIT: CPT | Performed by: ORTHOPAEDIC SURGERY

## 2018-02-01 NOTE — PROGRESS NOTES
Subjective   Patient ID: April Judy Nicolas is a 42 y.o. female  Post-op and Follow-up of the Right Wrist (Right hand CTR on 01/10/18 )             History of Present Illness  Right-hand incision now well-healed slightly itchy she feels better with her protected, notices both shoulders pop and snap periodically with pain no recent injury or dislocation either shoulder.      Review of Systems   Constitutional: Negative for diaphoresis, fever and unexpected weight change.   HENT: Negative for dental problem and sore throat.    Eyes: Negative for visual disturbance.   Respiratory: Negative for shortness of breath.    Cardiovascular: Negative for chest pain.   Gastrointestinal: Negative for abdominal pain, constipation, diarrhea, nausea and vomiting.   Genitourinary: Negative for difficulty urinating and frequency.   Musculoskeletal: Positive for arthralgias.   Neurological: Negative for headaches.   Hematological: Does not bruise/bleed easily.   All other systems reviewed and are negative.      Past Medical History:   Diagnosis Date   • Anxiety and depression    • Body piercing     EARS BILATERAL   • Bursitis    • Chest pain     WITH EXCEDRINE TOO MUCH CAFFEINE   • Fibromyalgia    • GERD (gastroesophageal reflux disease)    • Hernia, umbilical    • High cholesterol    • Hypertension    • Knee pain, bilateral    • Malignant melanoma     stage 1, left leg   • Migraines    • OCD (obsessive compulsive disorder)    • PTSD (post-traumatic stress disorder)    • Sleep apnea     CPAP USAGE   • Wears glasses         Past Surgical History:   Procedure Laterality Date   • CARPAL TUNNEL RELEASE Right 1/10/2018    Procedure: ELECTIVE RIGHT CARPAL TUNNEL RELEASE;  Surgeon: Gus Ramos MD;  Location: [facility];  Service:    • SKIN BIOPSY      STAGE 1 LEFT THIGH   • SKIN CANCER EXCISION     • TOTAL ABDOMINAL HYSTERECTOMY WITH SALPINGO OOPHORECTOMY  2004    ? Hormonal migraines       Family History   Problem Relation Age of Onset  "  • Rheum arthritis Mother    • Ulcerative colitis Mother    • COPD Father    • Crohn's disease Sister    • No Known Problems Maternal Aunt    • No Known Problems Maternal Uncle    • No Known Problems Paternal Aunt    • No Known Problems Paternal Uncle    • Kidney disease Maternal Grandmother    • Alzheimer's disease Maternal Grandfather    • No Known Problems Paternal Grandmother    • No Known Problems Paternal Grandfather    • Breast cancer Neg Hx    • Ovarian cancer Neg Hx        Social History     Social History   • Marital status: Single     Spouse name: N/A   • Number of children: N/A   • Years of education: N/A     Occupational History   • Not on file.     Social History Main Topics   • Smoking status: Current Every Day Smoker     Packs/day: 1.00     Years: 9.00     Types: Cigarettes   • Smokeless tobacco: Never Used   • Alcohol use No   • Drug use: No   • Sexual activity: Defer     Other Topics Concern   • Not on file     Social History Narrative       I have reviewed all of the above social hx, family hx, surgical hx, medications, allergies & ROS and confirm that it is accurate.    Allergies   Allergen Reactions   • Morphine And Related Nausea And Vomiting   • Penicillins    • Tetracyclines & Related        Objective   Resp 16  Ht 165.1 cm (65\")  Wt 104 kg (229 lb 9.6 oz)  BMI 38.21 kg/m2   Physical Exam  Constitutional: Patient is oriented to person, place, and time. Patient appears well-developed and well-nourished.   HENT:Head: Normocephalic and atraumatic.   Eyes: EOM are normal. Pupils are equal, round, and reactive to light.   Neck: Normal range of motion. Neck supple.   Cardiovascular: Normal rate.    Pulmonary/Chest: Effort normal and breath sounds normal.   Abdominal: Soft.   Neurological: Patient is alert and oriented to person, place, and time.   Skin: Skin is warm and dry.   Psychiatric: Patient has a normal mood and affect.   Nursing note and vitals reviewed.       Ortho Exam   Right-hand " incision well healed fingers neurovascularly intact minimal erythema surrounding the incisional site    Both shoulders demonstrate full active range of motion with minimal signs of impingement of focal tenderness instability or swelling atrophy or change in neurovascular status noted in either shoulder    Assessment/Plan   Review of Radiographic Studies:    No new imaging done today.      Procedures     April was seen today for post-op and follow-up.    Diagnoses and all orders for this visit:    Bursitis/tendonitis, shoulder    Carpal tunnel syndrome of right wrist     Orthopedic activities reviewed and patient expressed appreciation and Risk, benefits, and merits of treatment alternatives reviewed with the patient and questions answered      Recommendations/Plan:   Work/Activity Status: Unable to return to work until next evaluation    Patient agreeable to call or return sooner for any concerns.             Impression:  Status post right hand carpal tunnel release, bilateral shoulder impingement  Plan:  Home exercises for both shoulders, appropriate strengthening for the right hand recheck in 2-3 weeks release to work at that time

## 2018-02-20 RX ORDER — HYDROXYCHLOROQUINE SULFATE 200 MG/1
200 TABLET, FILM COATED ORAL DAILY
Qty: 30 TABLET | Refills: 2 | Status: SHIPPED | OUTPATIENT
Start: 2018-02-20 | End: 2018-06-15 | Stop reason: SDUPTHER

## 2018-02-22 ENCOUNTER — OFFICE VISIT (OUTPATIENT)
Dept: ORTHOPEDIC SURGERY | Facility: CLINIC | Age: 43
End: 2018-02-22

## 2018-02-22 VITALS — WEIGHT: 232 LBS | RESPIRATION RATE: 16 BRPM | BODY MASS INDEX: 38.65 KG/M2 | HEIGHT: 65 IN

## 2018-02-22 DIAGNOSIS — G56.03 BILATERAL CARPAL TUNNEL SYNDROME: Primary | ICD-10-CM

## 2018-02-22 PROCEDURE — 99024 POSTOP FOLLOW-UP VISIT: CPT | Performed by: ORTHOPAEDIC SURGERY

## 2018-02-22 PROCEDURE — 99213 OFFICE O/P EST LOW 20 MIN: CPT | Performed by: ORTHOPAEDIC SURGERY

## 2018-02-22 NOTE — PROGRESS NOTES
Subjective   Patient ID: Vanna Nicolas is a 42 y.o. female  Post-op and Follow-up of the Right Wrist (Right hand CTR on 01/10/18 )             History of Present Illness  Patient still complains of soreness and pain in the incisional area of the right hand, sensation is returned to near normal and the fingertips, also bothered by anterior bilateral knee pain worse going up and down stairs denies recent fall or injury to either knee.  She has taken Voltaren in the past but unsure whether she has any at home currently.  Also has used topical gels to the hand with minimal improvement.      Review of Systems   Constitutional: Negative for diaphoresis, fever and unexpected weight change.   HENT: Negative for dental problem and sore throat.    Eyes: Negative for visual disturbance.   Respiratory: Negative for shortness of breath.    Cardiovascular: Negative for chest pain.   Gastrointestinal: Negative for abdominal pain, constipation, diarrhea, nausea and vomiting.   Genitourinary: Negative for difficulty urinating and frequency.   Musculoskeletal: Positive for arthralgias.   Neurological: Negative for headaches.   Hematological: Does not bruise/bleed easily.   All other systems reviewed and are negative.      Past Medical History:   Diagnosis Date   • Anxiety and depression    • Body piercing     EARS BILATERAL   • Bursitis    • Chest pain     WITH EXCEDRINE TOO MUCH CAFFEINE   • Fibromyalgia    • GERD (gastroesophageal reflux disease)    • Hernia, umbilical    • High cholesterol    • Hypertension    • Knee pain, bilateral    • Malignant melanoma     stage 1, left leg   • Migraines    • OCD (obsessive compulsive disorder)    • PTSD (post-traumatic stress disorder)    • Sleep apnea     CPAP USAGE   • Wears glasses         Past Surgical History:   Procedure Laterality Date   • CARPAL TUNNEL RELEASE Right 1/10/2018    Procedure: ELECTIVE RIGHT CARPAL TUNNEL RELEASE;  Surgeon: Gus Ramos MD;  Location: [facility]  "OR;  Service:    • SKIN BIOPSY      STAGE 1 LEFT THIGH   • SKIN CANCER EXCISION     • TOTAL ABDOMINAL HYSTERECTOMY WITH SALPINGO OOPHORECTOMY  2004    ? Hormonal migraines       Family History   Problem Relation Age of Onset   • Rheum arthritis Mother    • Ulcerative colitis Mother    • COPD Father    • Crohn's disease Sister    • No Known Problems Maternal Aunt    • No Known Problems Maternal Uncle    • No Known Problems Paternal Aunt    • No Known Problems Paternal Uncle    • Kidney disease Maternal Grandmother    • Alzheimer's disease Maternal Grandfather    • No Known Problems Paternal Grandmother    • No Known Problems Paternal Grandfather    • Breast cancer Neg Hx    • Ovarian cancer Neg Hx        Social History     Social History   • Marital status: Single     Spouse name: N/A   • Number of children: N/A   • Years of education: N/A     Occupational History   • Not on file.     Social History Main Topics   • Smoking status: Current Every Day Smoker     Packs/day: 1.00     Years: 9.00     Types: Cigarettes   • Smokeless tobacco: Never Used   • Alcohol use No   • Drug use: No   • Sexual activity: Defer     Other Topics Concern   • Not on file     Social History Narrative       I have reviewed all of the above social hx, family hx, surgical hx, medications, allergies & ROS and confirm that it is accurate.    Allergies   Allergen Reactions   • Morphine And Related Nausea And Vomiting   • Penicillins    • Tetracyclines & Related        Objective   Resp 16  Ht 165.1 cm (65\")  Wt 105 kg (232 lb)  BMI 38.61 kg/m2   Physical Exam  Constitutional: Patient is oriented to person, place, and time. Patient appears well-developed and well-nourished.   HENT:Head: Normocephalic and atraumatic.   Eyes: EOM are normal. Pupils are equal, round, and reactive to light.   Neck: Normal range of motion. Neck supple.   Cardiovascular: Normal rate.    Pulmonary/Chest: Effort normal and breath sounds normal.   Abdominal: Soft. "   Neurological: Patient is alert and oriented to person, place, and time.   Skin: Skin is warm and dry.   Psychiatric: Patient has a normal mood and affect.   Nursing note and vitals reviewed.       Ortho Exam   Right hand with well-healed incisions slight tenderness over the incisional area only full wrist range of motion full finger range of motion negative Tinel sign over the median nerve sensation in median nerve distribution to the hand normal    Assessment/Plan   Review of Radiographic Studies:    No new imaging done today.      Procedures     April was seen today for post-op and follow-up.    Diagnoses and all orders for this visit:    Bilateral carpal tunnel syndrome     Physical therapy referral given      Recommendations/Plan:   Work/Activity Status: Unable to return to work until next evaluation    Patient agreeable to call or return sooner for any concerns.             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

## 2018-03-01 ENCOUNTER — OFFICE VISIT (OUTPATIENT)
Dept: INTERNAL MEDICINE | Facility: CLINIC | Age: 43
End: 2018-03-01

## 2018-03-01 VITALS
OXYGEN SATURATION: 98 % | HEIGHT: 65 IN | HEART RATE: 114 BPM | BODY MASS INDEX: 38.95 KG/M2 | SYSTOLIC BLOOD PRESSURE: 124 MMHG | WEIGHT: 233.8 LBS | RESPIRATION RATE: 12 BRPM | DIASTOLIC BLOOD PRESSURE: 80 MMHG

## 2018-03-01 DIAGNOSIS — R60.0 BILATERAL LOWER EXTREMITY EDEMA: ICD-10-CM

## 2018-03-01 DIAGNOSIS — R76.8 P-ANCA TITER POSITIVE: ICD-10-CM

## 2018-03-01 DIAGNOSIS — M62.830 MUSCLE SPASM OF BACK: ICD-10-CM

## 2018-03-01 DIAGNOSIS — M22.2X2 PATELLOFEMORAL STRESS SYNDROME OF LEFT KNEE: ICD-10-CM

## 2018-03-01 DIAGNOSIS — M53.3 SACROILIAC JOINT DYSFUNCTION OF RIGHT SIDE: ICD-10-CM

## 2018-03-01 DIAGNOSIS — R00.2 PALPITATIONS: ICD-10-CM

## 2018-03-01 DIAGNOSIS — R19.7 FREQUENT DIARRHEA: Primary | ICD-10-CM

## 2018-03-01 DIAGNOSIS — R00.0 TACHYCARDIA: ICD-10-CM

## 2018-03-01 DIAGNOSIS — G43.119 INTRACTABLE MIGRAINE WITH AURA WITHOUT STATUS MIGRAINOSUS: ICD-10-CM

## 2018-03-01 PROCEDURE — 99214 OFFICE O/P EST MOD 30 MIN: CPT | Performed by: FAMILY MEDICINE

## 2018-03-01 RX ORDER — DIVALPROEX SODIUM 500 MG/1
500 TABLET, EXTENDED RELEASE ORAL NIGHTLY
Qty: 90 TABLET | Refills: 1 | Status: SHIPPED | OUTPATIENT
Start: 2018-03-01

## 2018-03-01 NOTE — PROGRESS NOTES
Follow up visit.     SUBJECTIVE: April Judy Nicolas is a 42 y.o. female seen for a follow up visit;      LE edema: Not improving, tried stopping water pills but did worsen a bit.  Doesn't resolve w/ putting legs up or w/ compression stockings.      Diarrhea: has appt w/ GI in 2 weeks.  Now having diarrhea, loose stools for past few weeks.  Some cramping pain, no obvious blood in stools.  Has fam h/o IBD.      Migraines: still having once a week, but resolve w/ just phenergan.  Hasn't used stadol spray, forgot she had it.       The following portions of the patient's history were reviewed and updated as appropriate: She  has a past medical history of Anxiety and depression; Body piercing; Bursitis; Chest pain; Fibromyalgia; GERD (gastroesophageal reflux disease); Hernia, umbilical; High cholesterol; Hypertension; Knee pain, bilateral; Malignant melanoma; Migraines; OCD (obsessive compulsive disorder); PTSD (post-traumatic stress disorder); Sleep apnea; and Wears glasses.  She has Moderate episode of recurrent major depressive disorder; Migraine with aura and without status migrainosus, not intractable; Anxiety; and Surgical menopause on hormone replacement therapy on her pertinent problem list.  She  has a past surgical history that includes Skin cancer excision; Total abdominal hysterectomy w/ bilateral salpingoophorectomy (2004); Skin biopsy; and Carpal tunnel release (Right, 1/10/2018).  Her family history includes Alzheimer's disease in her maternal grandfather; COPD in her father; Crohn's disease in her sister; Kidney disease in her maternal grandmother; No Known Problems in her maternal aunt, maternal uncle, paternal aunt, paternal grandfather, paternal grandmother, and paternal uncle; Rheum arthritis in her mother; Ulcerative colitis in her mother. There is no history of Breast cancer or Ovarian cancer.  She  reports that she has been smoking Cigarettes.  She has a 9.00 pack-year smoking history. She has  "never used smokeless tobacco. She reports that she does not drink alcohol or use illicit drugs.  She has a current medication list which includes the following prescription(s): cyclobenzaprine, diclofenac, divalproex, estradiol, furosemide, gabapentin, hydroxychloroquine, potassium chloride, pramipexole, promethazine, sertraline, triamterene-hydrochlorothiazide, and temazepam..    Review of Systems   Constitutional: Positive for fatigue.   Cardiovascular: Positive for leg swelling.   Musculoskeletal: Positive for arthralgias.   Neurological: Positive for headaches.   Psychiatric/Behavioral: Positive for dysphoric mood.         OBJECTIVE:  /80  Pulse 114  Resp 12  Ht 165.1 cm (65\")  Wt 106 kg (233 lb 12.8 oz)  SpO2 98%  BMI 38.91 kg/m2     Physical Exam   Constitutional: She appears well-developed and well-nourished. No distress.           ASSESSMENT:   Diagnosis Plan   1. Frequent diarrhea  Fecal Leukocytes - Stool, Per Rectum    Giardia Antigen - Stool, Per Rectum    Ova & Parasite Examination - Stool, Per Rectum    Shiga-like Toxin Antigen, EIA - Stool, Per Rectum    Stool Culture With Yersinia - Stool, Per Rectum    Campylobacter Culture, Stool - Stool, Per Rectum    Inflammatory Bowel Disease Panel   2. P-ANCA titer positive  Comprehensive Metabolic Panel    CBC & Differential    Inflammatory Bowel Disease Panel    C-reactive Protein    Sedimentation Rate   3. Intractable migraine with aura without status migrainosus  divalproex (DEPAKOTE) 500 MG 24 hr tablet   4. Tachycardia  Inflammatory Bowel Disease Panel    C-reactive Protein    Sedimentation Rate    T4    T3, Free    TSH   5. Palpitations  Comprehensive Metabolic Panel    CBC & Differential   6. Patellofemoral stress syndrome of left knee     7. Sacroiliac joint dysfunction of right side     8. Bilateral lower extremity edema  Comprehensive Metabolic Panel    CBC & Differential   9. Muscle spasm of back           Suspect UC as cause for " arthritis symptoms and now diarrhea.  Has appt w/ GI, will do diarrhea workup, will likely be negative.

## 2018-03-02 ENCOUNTER — PATIENT MESSAGE (OUTPATIENT)
Dept: INTERNAL MEDICINE | Facility: CLINIC | Age: 43
End: 2018-03-02

## 2018-03-02 DIAGNOSIS — G25.81 RESTLESS LEG SYNDROME: ICD-10-CM

## 2018-03-02 DIAGNOSIS — G43.119 INTRACTABLE MIGRAINE WITH AURA WITHOUT STATUS MIGRAINOSUS: ICD-10-CM

## 2018-03-02 RX ORDER — TEMAZEPAM 30 MG/1
30 CAPSULE ORAL NIGHTLY PRN
Qty: 30 CAPSULE | Refills: 2 | OUTPATIENT
Start: 2018-03-02 | End: 2018-06-26 | Stop reason: SDUPTHER

## 2018-03-02 NOTE — TELEPHONE ENCOUNTER
From: April Judy Nicoals  To: Fouzia Fuentes MD  Sent: 3/2/2018 2:01 AM EST  Subject: Prescription Question    I need a refill called in for Temazepam 30 mg.

## 2018-03-05 PROBLEM — R76.8 P-ANCA TITER POSITIVE: Status: ACTIVE | Noted: 2018-03-05

## 2018-03-07 LAB
ALBUMIN SERPL-MCNC: 3.8 G/DL (ref 3.5–5.5)
ALBUMIN/GLOB SERPL: 1.5 {RATIO} (ref 1.2–2.2)
ALP SERPL-CCNC: 100 IU/L (ref 39–117)
ALT SERPL-CCNC: 21 IU/L (ref 0–32)
AST SERPL-CCNC: 18 IU/L (ref 0–40)
BACTERIA SPEC CULT: NORMAL
BAKER'S YEAST IGA QN: <20 UNITS (ref 0–24.9)
BAKER'S YEAST IGG QN: <20 UNITS (ref 0–24.9)
BASOPHILS # BLD AUTO: 0 X10E3/UL (ref 0–0.2)
BASOPHILS NFR BLD AUTO: 1 %
BILIRUB SERPL-MCNC: <0.2 MG/DL (ref 0–1.2)
BUN SERPL-MCNC: 19 MG/DL (ref 6–24)
BUN/CREAT SERPL: 26 (ref 9–23)
CALCIUM SERPL-MCNC: 9.7 MG/DL (ref 8.7–10.2)
CAMPYLOBACTER STL CULT: NORMAL
CHLORIDE SERPL-SCNC: 103 MMOL/L (ref 96–106)
CO2 SERPL-SCNC: 26 MMOL/L (ref 18–29)
CREAT SERPL-MCNC: 0.74 MG/DL (ref 0.57–1)
CRP SERPL-MCNC: 7.4 MG/L (ref 0–4.9)
E COLI SXT STL QL IA: NEGATIVE
EOSINOPHIL # BLD AUTO: 0.4 X10E3/UL (ref 0–0.4)
EOSINOPHIL NFR BLD AUTO: 5 %
ERYTHROCYTE [DISTWIDTH] IN BLOOD BY AUTOMATED COUNT: 13.6 % (ref 12.3–15.4)
ERYTHROCYTE [SEDIMENTATION RATE] IN BLOOD BY WESTERGREN METHOD: 19 MM/HR (ref 0–32)
G LAMBLIA AG STL QL IA: NEGATIVE
GFR SERPLBLD CREATININE-BSD FMLA CKD-EPI: 101 ML/MIN/1.73
GFR SERPLBLD CREATININE-BSD FMLA CKD-EPI: 116 ML/MIN/1.73
GLOBULIN SER CALC-MCNC: 2.6 G/DL (ref 1.5–4.5)
GLUCOSE SERPL-MCNC: 130 MG/DL (ref 65–99)
HCT VFR BLD AUTO: 41.7 % (ref 34–46.6)
HGB BLD-MCNC: 14.3 G/DL (ref 11.1–15.9)
IMM GRANULOCYTES # BLD: 0 X10E3/UL (ref 0–0.1)
IMM GRANULOCYTES NFR BLD: 0 %
LYMPHOCYTES # BLD AUTO: 1.6 X10E3/UL (ref 0.7–3.1)
LYMPHOCYTES NFR BLD AUTO: 21 %
MCH RBC QN AUTO: 30.8 PG (ref 26.6–33)
MCHC RBC AUTO-ENTMCNC: 34.3 G/DL (ref 31.5–35.7)
MCV RBC AUTO: 90 FL (ref 79–97)
MONOCYTES # BLD AUTO: 0.5 X10E3/UL (ref 0.1–0.9)
MONOCYTES NFR BLD AUTO: 7 %
NEUTROPHILS # BLD AUTO: 5.1 X10E3/UL (ref 1.4–7)
NEUTROPHILS NFR BLD AUTO: 66 %
O+P SPEC MICRO: NORMAL
O+P STL CONC: NORMAL
P-ANCA ATYPICAL TITR SER IF: NORMAL TITER
PLATELET # BLD AUTO: 225 X10E3/UL (ref 150–379)
POTASSIUM SERPL-SCNC: 4.3 MMOL/L (ref 3.5–5.2)
PROT SERPL-MCNC: 6.4 G/DL (ref 6–8.5)
RBC # BLD AUTO: 4.65 X10E6/UL (ref 3.77–5.28)
SODIUM SERPL-SCNC: 144 MMOL/L (ref 134–144)
T3FREE SERPL-MCNC: 3.4 PG/ML (ref 2–4.4)
T4 SERPL-MCNC: 5.2 UG/DL (ref 4.5–12)
TSH SERPL DL<=0.005 MIU/L-ACNC: 2.3 UIU/ML (ref 0.45–4.5)
WBC # BLD AUTO: 7.7 X10E3/UL (ref 3.4–10.8)
WBC STL QL MICRO: NORMAL
WBC STL QL MICRO: NORMAL

## 2018-03-08 ENCOUNTER — OFFICE VISIT (OUTPATIENT)
Dept: ORTHOPEDIC SURGERY | Facility: CLINIC | Age: 43
End: 2018-03-08

## 2018-03-08 VITALS — HEIGHT: 65 IN | BODY MASS INDEX: 38.82 KG/M2 | RESPIRATION RATE: 18 BRPM | WEIGHT: 233 LBS

## 2018-03-08 DIAGNOSIS — G56.03 CARPAL TUNNEL SYNDROME, BILATERAL: Primary | ICD-10-CM

## 2018-03-08 PROCEDURE — 99024 POSTOP FOLLOW-UP VISIT: CPT | Performed by: ORTHOPAEDIC SURGERY

## 2018-03-08 NOTE — PROGRESS NOTES
Subjective   Patient ID: Vanna Nicolas is a 42 y.o. female  Post-op and Pain of the Right Hand (Patient is here today for a post op follow up from surgery on 01/10/18 of right CTR and states she seems to not be getting any better.)             History of Present Illness    Sensation is improved right hand soreness persists around surgical incision, also complains of increasing numbness pain and burning in her left nondominant hand.    Review of Systems   Constitutional: Negative for fever.   HENT: Negative for voice change.    Eyes: Negative for visual disturbance.   Respiratory: Negative for shortness of breath.    Cardiovascular: Negative for chest pain.   Gastrointestinal: Negative for abdominal distention and abdominal pain.   Genitourinary: Negative for dysuria.   Musculoskeletal: Positive for arthralgias. Negative for gait problem and joint swelling.   Skin: Negative for rash.   Neurological: Negative for speech difficulty.   Hematological: Does not bruise/bleed easily.   Psychiatric/Behavioral: Negative for confusion.       Past Medical History:   Diagnosis Date   • Anxiety and depression    • Body piercing     EARS BILATERAL   • Bursitis    • Chest pain     WITH EXCEDRINE TOO MUCH CAFFEINE   • Fibromyalgia    • GERD (gastroesophageal reflux disease)    • Hernia, umbilical    • High cholesterol    • Hypertension    • Knee pain, bilateral    • Malignant melanoma     stage 1, left leg   • Migraines    • OCD (obsessive compulsive disorder)    • PTSD (post-traumatic stress disorder)    • Sleep apnea     CPAP USAGE   • Wears glasses         Past Surgical History:   Procedure Laterality Date   • CARPAL TUNNEL RELEASE Right 1/10/2018    Procedure: ELECTIVE RIGHT CARPAL TUNNEL RELEASE;  Surgeon: Gus Ramos MD;  Location: [facility];  Service:    • SKIN BIOPSY      STAGE 1 LEFT THIGH   • SKIN CANCER EXCISION     • TOTAL ABDOMINAL HYSTERECTOMY WITH SALPINGO OOPHORECTOMY  2004    ? Hormonal migraines  "      Family History   Problem Relation Age of Onset   • Rheum arthritis Mother    • Ulcerative colitis Mother    • COPD Father    • Crohn's disease Sister    • No Known Problems Maternal Aunt    • No Known Problems Maternal Uncle    • No Known Problems Paternal Aunt    • No Known Problems Paternal Uncle    • Kidney disease Maternal Grandmother    • Alzheimer's disease Maternal Grandfather    • No Known Problems Paternal Grandmother    • No Known Problems Paternal Grandfather    • Breast cancer Neg Hx    • Ovarian cancer Neg Hx        Social History     Social History   • Marital status: Single     Spouse name: N/A   • Number of children: N/A   • Years of education: N/A     Occupational History   • Not on file.     Social History Main Topics   • Smoking status: Current Every Day Smoker     Packs/day: 1.00     Years: 9.00     Types: Cigarettes   • Smokeless tobacco: Never Used   • Alcohol use No   • Drug use: No   • Sexual activity: Defer     Other Topics Concern   • Not on file     Social History Narrative       I have reviewed all of the above social hx, family hx, surgical hx, medications, allergies & ROS and confirm that it is accurate.    Allergies   Allergen Reactions   • Morphine And Related Nausea And Vomiting   • Penicillins    • Tetracyclines & Related        Objective   Resp 18  Ht 165.1 cm (65\")  Wt 106 kg (233 lb)  BMI 38.77 kg/m2   Physical Exam  Constitutional: Patient is oriented to person, place, and time. Patient appears well-developed and well-nourished.   HENT:Head: Normocephalic and atraumatic.   Eyes: EOM are normal. Pupils are equal, round, and reactive to light.   Neck: Normal range of motion. Neck supple.   Cardiovascular: Normal rate.    Pulmonary/Chest: Effort normal and breath sounds normal.   Abdominal: Soft.   Neurological: Patient is alert and oriented to person, place, and time.   Skin: Skin is warm and dry.   Psychiatric: Patient has a normal mood and affect.   Nursing note and " vitals reviewed.       Ortho Exam     Right hand: incisional area with tenderness ulnarly and radially no swelling fluctuance full passive and active range of motion all fingers, sensation normal in median nerve distribution, Phi testing negative good capillary refill all fingers, wrist with full range of motion  Assessment/Plan   Review of Radiographic Studies:    No new imaging done today.      Procedures     April was seen today for post-op and pain.    Diagnoses and all orders for this visit:    Carpal tunnel syndrome, bilateral  -     Ambulatory Referral to Occupational Therapy     Physical therapy referral given and Work status form completed and provided to patient      Recommendations/Plan:   Work/Activity Status: Unable to return to work until next evaluation    Patient agreeable to call or return sooner for any concerns.             Impression:  Continued right hand pain status post carpal tunnel release January, left hand carpal tunnel syndrome with mild findings on EMG study, generalized inflammatory condition continued  Plan:  Continue Voltaren medication, refer to therapy for hand therapy right hand incisional pain, discuss scheduling her  left hand carpal tunnel release next visit

## 2018-03-15 ENCOUNTER — OFFICE VISIT (OUTPATIENT)
Dept: NEUROLOGY | Facility: CLINIC | Age: 43
End: 2018-03-15

## 2018-03-15 VITALS
BODY MASS INDEX: 38.82 KG/M2 | SYSTOLIC BLOOD PRESSURE: 114 MMHG | HEART RATE: 100 BPM | DIASTOLIC BLOOD PRESSURE: 72 MMHG | OXYGEN SATURATION: 98 % | WEIGHT: 233 LBS | HEIGHT: 65 IN

## 2018-03-15 DIAGNOSIS — G43.019 INTRACTABLE MIGRAINE WITHOUT AURA AND WITHOUT STATUS MIGRAINOSUS: Primary | ICD-10-CM

## 2018-03-15 PROCEDURE — 99203 OFFICE O/P NEW LOW 30 MIN: CPT | Performed by: NURSE PRACTITIONER

## 2018-04-05 ENCOUNTER — TELEPHONE (OUTPATIENT)
Dept: INTERNAL MEDICINE | Facility: CLINIC | Age: 43
End: 2018-04-05

## 2018-04-05 DIAGNOSIS — G25.81 RESTLESS LEG SYNDROME: ICD-10-CM

## 2018-04-05 RX ORDER — PRAMIPEXOLE DIHYDROCHLORIDE 1.5 MG/1
1.5 TABLET ORAL NIGHTLY
Qty: 30 TABLET | Refills: 2 | Status: SHIPPED | OUTPATIENT
Start: 2018-04-05 | End: 2018-08-02 | Stop reason: SDUPTHER

## 2018-04-05 NOTE — TELEPHONE ENCOUNTER
----- Message from Vanna Nicolas sent at 4/5/2018 11:27 AM EDT -----  Regarding: Prescription Question  Contact: [phone]  I need a refill called in for Pramipexole Dihydrochloride 1.5 mg.

## 2018-04-10 ENCOUNTER — OFFICE VISIT (OUTPATIENT)
Dept: ORTHOPEDIC SURGERY | Facility: CLINIC | Age: 43
End: 2018-04-10

## 2018-04-10 ENCOUNTER — APPOINTMENT (OUTPATIENT)
Dept: PREADMISSION TESTING | Facility: HOSPITAL | Age: 43
End: 2018-04-10

## 2018-04-10 ENCOUNTER — HOSPITAL ENCOUNTER (OUTPATIENT)
Dept: GENERAL RADIOLOGY | Facility: HOSPITAL | Age: 43
Discharge: HOME | End: 2018-04-10
Admitting: ORTHOPAEDIC SURGERY

## 2018-04-10 VITALS — HEIGHT: 65 IN | WEIGHT: 230 LBS | BODY MASS INDEX: 38.32 KG/M2

## 2018-04-10 VITALS — WEIGHT: 233 LBS | HEIGHT: 65 IN | RESPIRATION RATE: 18 BRPM | BODY MASS INDEX: 38.82 KG/M2

## 2018-04-10 DIAGNOSIS — G56.03 CARPAL TUNNEL SYNDROME, BILATERAL: ICD-10-CM

## 2018-04-10 DIAGNOSIS — R52 PAIN: Primary | ICD-10-CM

## 2018-04-10 DIAGNOSIS — IMO0002 BURSITIS/TENDONITIS, SHOULDER: ICD-10-CM

## 2018-04-10 LAB
ALBUMIN SERPL-MCNC: 4.4 G/DL (ref 3.5–5)
ALBUMIN/GLOB SERPL: 1.3 G/DL (ref 1–2)
ALP SERPL-CCNC: 103 U/L (ref 38–126)
ALT SERPL W P-5'-P-CCNC: 43 U/L (ref 13–69)
ANION GAP SERPL CALCULATED.3IONS-SCNC: 16.1 MMOL/L (ref 10–20)
AST SERPL-CCNC: 23 U/L (ref 15–46)
BASOPHILS # BLD AUTO: 0.05 10*3/MM3 (ref 0–0.2)
BASOPHILS NFR BLD AUTO: 0.5 % (ref 0–2.5)
BILIRUB SERPL-MCNC: 0.3 MG/DL (ref 0.2–1.3)
BILIRUB UR QL STRIP: NEGATIVE
BUN BLD-MCNC: 22 MG/DL (ref 7–20)
BUN/CREAT SERPL: 31.4 (ref 7.1–23.5)
CALCIUM SPEC-SCNC: 10 MG/DL (ref 8.4–10.2)
CHLORIDE SERPL-SCNC: 104 MMOL/L (ref 98–107)
CLARITY UR: CLEAR
CO2 SERPL-SCNC: 26 MMOL/L (ref 26–30)
COLOR UR: YELLOW
CREAT BLD-MCNC: 0.7 MG/DL (ref 0.6–1.3)
DEPRECATED RDW RBC AUTO: 41.1 FL (ref 37–54)
EOSINOPHIL # BLD AUTO: 0.43 10*3/MM3 (ref 0–0.7)
EOSINOPHIL NFR BLD AUTO: 4.7 % (ref 0–7)
ERYTHROCYTE [DISTWIDTH] IN BLOOD BY AUTOMATED COUNT: 12.4 % (ref 11.5–14.5)
GFR SERPL CREATININE-BSD FRML MDRD: 92 ML/MIN/1.73
GLOBULIN UR ELPH-MCNC: 3.5 GM/DL
GLUCOSE BLD-MCNC: 107 MG/DL (ref 74–98)
GLUCOSE UR STRIP-MCNC: NEGATIVE MG/DL
HCT VFR BLD AUTO: 43.7 % (ref 37–47)
HGB BLD-MCNC: 14.8 G/DL (ref 12–16)
HGB UR QL STRIP.AUTO: NEGATIVE
IMM GRANULOCYTES # BLD: 0.06 10*3/MM3 (ref 0–0.06)
IMM GRANULOCYTES NFR BLD: 0.7 % (ref 0–0.6)
KETONES UR QL STRIP: NEGATIVE
LEUKOCYTE ESTERASE UR QL STRIP.AUTO: NEGATIVE
LYMPHOCYTES # BLD AUTO: 1.27 10*3/MM3 (ref 0.6–3.4)
LYMPHOCYTES NFR BLD AUTO: 13.8 % (ref 10–50)
MCH RBC QN AUTO: 30.7 PG (ref 27–31)
MCHC RBC AUTO-ENTMCNC: 33.9 G/DL (ref 30–37)
MCV RBC AUTO: 90.7 FL (ref 81–99)
MONOCYTES # BLD AUTO: 0.87 10*3/MM3 (ref 0–0.9)
MONOCYTES NFR BLD AUTO: 9.5 % (ref 0–12)
NEUTROPHILS # BLD AUTO: 6.51 10*3/MM3 (ref 2–6.9)
NEUTROPHILS NFR BLD AUTO: 70.8 % (ref 37–80)
NITRITE UR QL STRIP: NEGATIVE
NRBC BLD MANUAL-RTO: 0 /100 WBC (ref 0–0)
PH UR STRIP.AUTO: 6 [PH] (ref 5–8)
PLATELET # BLD AUTO: 220 10*3/MM3 (ref 130–400)
PMV BLD AUTO: 10.7 FL (ref 6–12)
POTASSIUM BLD-SCNC: 4.1 MMOL/L (ref 3.5–5.1)
PROT SERPL-MCNC: 7.9 G/DL (ref 6.3–8.2)
PROT UR QL STRIP: NEGATIVE
RBC # BLD AUTO: 4.82 10*6/MM3 (ref 4.2–5.4)
SODIUM BLD-SCNC: 142 MMOL/L (ref 137–145)
SP GR UR STRIP: >=1.03 (ref 1–1.03)
UROBILINOGEN UR QL STRIP: NORMAL
WBC NRBC COR # BLD: 9.19 10*3/MM3 (ref 4.8–10.8)

## 2018-04-10 PROCEDURE — 85025 COMPLETE CBC W/AUTO DIFF WBC: CPT | Performed by: ORTHOPAEDIC SURGERY

## 2018-04-10 PROCEDURE — 99024 POSTOP FOLLOW-UP VISIT: CPT | Performed by: ORTHOPAEDIC SURGERY

## 2018-04-10 PROCEDURE — 87081 CULTURE SCREEN ONLY: CPT | Performed by: ORTHOPAEDIC SURGERY

## 2018-04-10 PROCEDURE — 81003 URINALYSIS AUTO W/O SCOPE: CPT | Performed by: ORTHOPAEDIC SURGERY

## 2018-04-10 PROCEDURE — 71046 X-RAY EXAM CHEST 2 VIEWS: CPT

## 2018-04-10 PROCEDURE — 36415 COLL VENOUS BLD VENIPUNCTURE: CPT | Performed by: ORTHOPAEDIC SURGERY

## 2018-04-10 PROCEDURE — 20610 DRAIN/INJ JOINT/BURSA W/O US: CPT | Performed by: ORTHOPAEDIC SURGERY

## 2018-04-10 PROCEDURE — 99214 OFFICE O/P EST MOD 30 MIN: CPT | Performed by: ORTHOPAEDIC SURGERY

## 2018-04-10 PROCEDURE — 80053 COMPREHEN METABOLIC PANEL: CPT | Performed by: ORTHOPAEDIC SURGERY

## 2018-04-10 RX ORDER — LIDOCAINE HYDROCHLORIDE 10 MG/ML
2 INJECTION, SOLUTION INFILTRATION; PERINEURAL
Status: COMPLETED | OUTPATIENT
Start: 2018-04-10 | End: 2018-04-10

## 2018-04-10 RX ORDER — TRIAMCINOLONE ACETONIDE 40 MG/ML
40 INJECTION, SUSPENSION INTRA-ARTICULAR; INTRAMUSCULAR
Status: COMPLETED | OUTPATIENT
Start: 2018-04-10 | End: 2018-04-10

## 2018-04-10 RX ORDER — POTASSIUM CHLORIDE 750 MG/1
CAPSULE, EXTENDED RELEASE ORAL
Refills: 1 | COMMUNITY
Start: 2018-04-05 | End: 2020-09-16

## 2018-04-10 RX ORDER — DIPHENOXYLATE HYDROCHLORIDE AND ATROPINE SULFATE 2.5; .025 MG/1; MG/1
2 TABLET ORAL 4 TIMES DAILY PRN
Refills: 1 | COMMUNITY
Start: 2018-03-29 | End: 2020-09-16

## 2018-04-10 RX ADMIN — LIDOCAINE HYDROCHLORIDE 2 ML: 10 INJECTION, SOLUTION INFILTRATION; PERINEURAL at 14:48

## 2018-04-10 RX ADMIN — TRIAMCINOLONE ACETONIDE 40 MG: 40 INJECTION, SUSPENSION INTRA-ARTICULAR; INTRAMUSCULAR at 14:48

## 2018-04-10 NOTE — PROGRESS NOTES
Subjective   Patient ID: Vanna Nicolas is a 42 y.o. female  Post-op and Pain of the Right Hand (01/10/18  Right Carpal Tunnel Release. Patient is here today to follow up on her right hand and states she is having a lot of pain, and the pain was worse yesterday but hasn't improved.)             History of Present Illness  Still complains of right thenar pain and incisional area pain but overall improved sensation in the right hand status post carpal tunnel release.    Complains of bilateral shoulder pain stiffness tightness not improved at home exercise denies recent fall or injury history of dislocation or associated neck pain or paresthesias.      Review of Systems   Constitutional: Negative for fever.   HENT: Negative for voice change.    Eyes: Negative for visual disturbance.   Respiratory: Negative for shortness of breath.    Cardiovascular: Negative for chest pain.   Gastrointestinal: Negative for abdominal distention and abdominal pain.   Genitourinary: Negative for dysuria.   Musculoskeletal: Positive for arthralgias. Negative for gait problem and joint swelling.   Skin: Negative for rash.   Neurological: Negative for speech difficulty.   Hematological: Does not bruise/bleed easily.   Psychiatric/Behavioral: Negative for confusion.       Past Medical History:   Diagnosis Date   • Anxiety and depression    • Body piercing     EARS BILATERAL   • Bursitis    • Chest pain     WITH EXCEDRINE TOO MUCH CAFFEINE   • Fibromyalgia    • GERD (gastroesophageal reflux disease)    • Hernia, umbilical    • High cholesterol    • Hypertension    • Knee pain, bilateral    • Malignant melanoma     stage 1, left leg   • Migraines    • OCD (obsessive compulsive disorder)    • PTSD (post-traumatic stress disorder)    • Sleep apnea     CPAP USAGE   • Wears glasses         Past Surgical History:   Procedure Laterality Date   • CARPAL TUNNEL RELEASE Right 1/10/2018    Procedure: ELECTIVE RIGHT CARPAL TUNNEL RELEASE;  Surgeon:  "Gus Ramos MD;  Location: [facility] OR;  Service:    • SKIN BIOPSY      STAGE 1 LEFT THIGH   • SKIN CANCER EXCISION     • TOTAL ABDOMINAL HYSTERECTOMY WITH SALPINGO OOPHORECTOMY  2004    ? Hormonal migraines       Family History   Problem Relation Age of Onset   • Rheum arthritis Mother    • Ulcerative colitis Mother    • COPD Father    • Crohn's disease Sister    • No Known Problems Maternal Aunt    • No Known Problems Maternal Uncle    • No Known Problems Paternal Aunt    • No Known Problems Paternal Uncle    • Kidney disease Maternal Grandmother    • Alzheimer's disease Maternal Grandfather    • No Known Problems Paternal Grandmother    • No Known Problems Paternal Grandfather    • Breast cancer Neg Hx    • Ovarian cancer Neg Hx        Social History     Social History   • Marital status: Single     Spouse name: N/A   • Number of children: N/A   • Years of education: N/A     Occupational History   • Not on file.     Social History Main Topics   • Smoking status: Current Every Day Smoker     Packs/day: 1.00     Years: 9.00     Types: Cigarettes   • Smokeless tobacco: Never Used   • Alcohol use No   • Drug use: No   • Sexual activity: Defer     Other Topics Concern   • Not on file     Social History Narrative   • No narrative on file       I have reviewed all of the above social hx, family hx, surgical hx, medications, allergies & ROS and confirm that it is accurate.    Allergies   Allergen Reactions   • Morphine And Related Nausea And Vomiting   • Penicillins    • Tetracyclines & Related        Objective   Resp 18   Ht 165.1 cm (65\")   Wt 106 kg (233 lb)   BMI 38.77 kg/m²    Physical Exam  Constitutional: Patient is oriented to person, place, and time. Patient appears well-developed and well-nourished.   HENT:Head: Normocephalic and atraumatic.   Eyes: EOM are normal. Pupils are equal, round, and reactive to light.   Neck: Normal range of motion. Neck supple.   Cardiovascular: Normal rate.    Pulmonary/Chest: " Effort normal and breath sounds normal.   Abdominal: Soft.   Neurological: Patient is alert and oriented to person, place, and time.   Skin: Skin is warm and dry.   Psychiatric: Patient has a normal mood and affect.   Nursing note and vitals reviewed.       Ortho Exam   Left shoulder: Mildly positive impingement sign full active range of motion negative inferior sulcus sign negative anterior apprehension sign negative tenderness anterior acromial clavicle joint or biceps tendon.    Shoulder positive impingement sign full active range of motion negative anterior sulcus sign negative anterior apprehension sign negative anterior acromial clavicle joint tenderness or bicipital tenderness.    Left hand: Positive Tinel's Phalen's and carpal compression tests, full wrist range of motion, no focal proximal carpal instability or focal tenderness hand neurovascularly intact with regard to vascular status diminished sensation median nerve distribution.    Assessment/Plan   Review of Radiographic Studies:    Radiographic images today of affected area I personally viewed and showed no sign of acute fracture or dislocation.      Large Joint Arthrocentesis  Date/Time: 4/10/2018 2:48 PM  Consent given by: patient  Site marked: site marked  Timeout: Immediately prior to procedure a time out was called to verify the correct patient, procedure, equipment, support staff and site/side marked as required   Supporting Documentation  Indications: pain   Procedure Details  Location: shoulder - R subacromial bursa  Medications administered: 2 mL lidocaine 1 %; 40 mg triamcinolone acetonide 40 MG/ML  Patient tolerance: patient tolerated the procedure well with no immediate complications    Large Joint Arthrocentesis  Date/Time: 4/10/2018 2:48 PM  Consent given by: patient  Site marked: site marked  Timeout: Immediately prior to procedure a time out was called to verify the correct patient, procedure, equipment, support staff and site/side  marked as required   Supporting Documentation  Indications: pain   Procedure Details  Location: shoulder - L subacromial bursa  Needle size: 22 G  Medications administered: 2 mL lidocaine 1 %; 40 mg triamcinolone acetonide 40 MG/ML  Patient tolerance: patient tolerated the procedure well with no immediate complications           April was seen today for post-op and pain.    Diagnoses and all orders for this visit:    Pain  -     XR Shoulder 2+ View Bilateral  -     Large Joint Arthrocentesis  -     Large Joint Arthrocentesis    Carpal tunnel syndrome, bilateral    Bursitis/tendonitis, shoulder       Physical therapy referral given      Recommendations/Plan:   Exercise, medications, injections, other patient advice, and return appointment as noted.    Patient agreeable to call or return sooner for any concerns.         I discussed with the patient the diagnosis, condition, natural history and treatment alternatives, both surgical and nonsurgical.  I reviewed the surgical procedural details using models, diagrams and reviewing x-ray findings.  I explained the nature of the operation, anesthesia methods and the postoperative recovery.  I explained risks and complications including but not limited to infection, bleeding, blood clotting, poor healing, chronic pain, stiffness, failure of the procedure, possible recurrence of the condition and anesthetic related risks.  The patient had opportunity to ask questions which were all answered to their satisfaction and decided to proceed with the plan for surgery.  I believe informed consent to proceed with the surgery was given verbally in my presence today.  The surgical consent form will be signed in the presence of the nursing staff prior to the surgery.    Impression:  Status post right hand carpal tunnel release, bilateral shoulder impingement syndrome subacromial bursitis, continued left hand carpal tunnel syndrome  Plan:  Left hand carpal tunnel release will be  scheduled, continue home exercise for both shoulders, if shoulder pain continues we'll order bilateral shoulder MRIs next visit

## 2018-04-13 LAB — MRSA SPEC QL CULT: NORMAL

## 2018-04-16 ENCOUNTER — HOSPITAL ENCOUNTER (OUTPATIENT)
Dept: GENERAL RADIOLOGY | Facility: HOSPITAL | Age: 43
Discharge: HOME | End: 2018-04-16
Attending: INTERNAL MEDICINE | Admitting: INTERNAL MEDICINE

## 2018-04-16 ENCOUNTER — OFFICE VISIT (OUTPATIENT)
Dept: INTERNAL MEDICINE | Facility: CLINIC | Age: 43
End: 2018-04-16

## 2018-04-16 VITALS
OXYGEN SATURATION: 96 % | TEMPERATURE: 98.3 F | HEART RATE: 82 BPM | HEIGHT: 65 IN | RESPIRATION RATE: 14 BRPM | DIASTOLIC BLOOD PRESSURE: 82 MMHG | SYSTOLIC BLOOD PRESSURE: 162 MMHG

## 2018-04-16 DIAGNOSIS — M25.551 PAIN OF RIGHT HIP JOINT: Primary | ICD-10-CM

## 2018-04-16 PROCEDURE — 99213 OFFICE O/P EST LOW 20 MIN: CPT | Performed by: INTERNAL MEDICINE

## 2018-04-16 PROCEDURE — 73502 X-RAY EXAM HIP UNI 2-3 VIEWS: CPT

## 2018-04-16 NOTE — PROGRESS NOTES
Subjective   April Judy Nicolas is a 42 y.o. female.     Chief Complaint   Patient presents with   • Hip Pain     right side        Hip Pain    The incident occurred more than 1 week ago. The incident occurred at home. The injury mechanism was a twisting injury. The pain is present in the right hip. The pain is at a severity of 7/10. The pain is moderate. The pain has been intermittent since onset. Associated symptoms include an inability to bear weight. Pertinent negatives include no loss of sensation, muscle weakness, numbness or tingling. The symptoms are aggravated by weight bearing. She has tried NSAIDs for the symptoms. The treatment provided no relief.          Current Outpatient Prescriptions:   •  cyclobenzaprine (FLEXERIL) 10 MG tablet, Take 1 tablet by mouth 2 (Two) Times a Day As Needed for Muscle Spasms., Disp: 30 tablet, Rfl: 1  •  diclofenac (VOLTAREN) 75 MG EC tablet, TAKE 1 TABLET BY MOUTH TWO TIMES A DAY AS NEEDED for back pain, Disp: 60 tablet, Rfl: 5  •  diphenoxylate-atropine (LOMOTIL) 2.5-0.025 MG per tablet, Take 2 tablets by mouth 4 (Four) Times a Day As Needed for Diarrhea., Disp: , Rfl: 1  •  divalproex (DEPAKOTE) 500 MG 24 hr tablet, Take 1 tablet by mouth Every Night., Disp: 90 tablet, Rfl: 1  •  estradiol (ESTRACE) 1 MG tablet, Take 1 tablet by mouth Daily., Disp: 30 tablet, Rfl: 2  •  furosemide (LASIX) 10 MG half tablet, Take 20 mg by mouth Daily As Needed (SWELLING)., Disp: , Rfl:   •  gabapentin (NEURONTIN) 600 MG tablet, Take 1 tablet by mouth 3 (Three) Times a Day As Needed (nerve pain, fibromyalgia)., Disp: 90 tablet, Rfl: 1  •  hydroxychloroquine (PLAQUENIL) 200 MG tablet, Take 1 tablet by mouth Daily., Disp: 30 tablet, Rfl: 2  •  potassium chloride (K-DUR,KLOR-CON) 20 MEQ CR tablet, Take 20 mEq by mouth Daily As Needed (TAKES WHEN TAKING LASIX)., Disp: , Rfl:   •  potassium chloride (MICRO-K) 10 MEQ CR capsule, , Disp: , Rfl: 1  •  pramipexole (MIRAPEX) 1.5 MG tablet, Take 1  tablet by mouth Every Night., Disp: 30 tablet, Rfl: 2  •  promethazine (PHENERGAN) 25 MG tablet, Take 1 tablet by mouth Daily As Needed (migraine)., Disp: 30 tablet, Rfl: 1  •  sertraline (ZOLOFT) 100 MG tablet, Take 1 tablet by mouth Daily., Disp: 30 tablet, Rfl: 2  •  temazepam (RESTORIL) 30 MG capsule, Take 1 capsule by mouth At Night As Needed for Sleep., Disp: 30 capsule, Rfl: 2  •  triamterene-hydrochlorothiazide (MAXZIDE-25) 37.5-25 MG per tablet, Take 1 tablet by mouth Daily., Disp: 30 tablet, Rfl: 5    The following portions of the patient's history were reviewed and updated as appropriate: allergies, current medications, past family history, past medical history, past social history, past surgical history and problem list.    Review of Systems   Constitutional: Negative.    Respiratory: Negative.    Cardiovascular: Negative.    Gastrointestinal: Negative.    Musculoskeletal: Positive for arthralgias.   Skin: Negative.    Neurological: Negative for tingling and numbness.   Psychiatric/Behavioral: Negative.        Objective   Physical Exam   Constitutional: She is oriented to person, place, and time. She appears well-nourished.   Neck: Neck supple.   Cardiovascular: Normal rate, regular rhythm and normal heart sounds.    Pulmonary/Chest: Effort normal and breath sounds normal.   Abdominal: Bowel sounds are normal.   Musculoskeletal: She exhibits tenderness (right ant groin tender).   Neurological: She is alert and oriented to person, place, and time.   Skin: Skin is warm.   Psychiatric: She has a normal mood and affect.       All tests have been reviewed.    Assessment/Plan   There are no diagnoses linked to this encounter.          flexeril, diclofenac, bid po prn patient has, do XR and ice and rest for now, call if no better, refer to PT

## 2018-04-17 ENCOUNTER — TELEPHONE (OUTPATIENT)
Dept: INTERNAL MEDICINE | Facility: CLINIC | Age: 43
End: 2018-04-17

## 2018-04-17 NOTE — TELEPHONE ENCOUNTER
PATIENT INFORMED OF X RAY RESULTS. SHE STARTS PT ON Monday - STILL HAVING HIP PAIN. DO YOU HAVE ANY OTHER RECOMMENDATIONS?

## 2018-04-18 ENCOUNTER — ANESTHESIA (OUTPATIENT)
Dept: PERIOP | Facility: HOSPITAL | Age: 43
End: 2018-04-18

## 2018-04-18 ENCOUNTER — HOSPITAL ENCOUNTER (OUTPATIENT)
Facility: HOSPITAL | Age: 43
Setting detail: HOSPITAL OUTPATIENT SURGERY
Discharge: HOME | End: 2018-04-18
Attending: ORTHOPAEDIC SURGERY | Admitting: ORTHOPAEDIC SURGERY

## 2018-04-18 ENCOUNTER — ANESTHESIA EVENT (OUTPATIENT)
Dept: PERIOP | Facility: HOSPITAL | Age: 43
End: 2018-04-18

## 2018-04-18 VITALS
RESPIRATION RATE: 16 BRPM | TEMPERATURE: 100.2 F | SYSTOLIC BLOOD PRESSURE: 141 MMHG | HEART RATE: 69 BPM | OXYGEN SATURATION: 96 % | DIASTOLIC BLOOD PRESSURE: 79 MMHG

## 2018-04-18 DIAGNOSIS — G56.03 CARPAL TUNNEL SYNDROME, BILATERAL: ICD-10-CM

## 2018-04-18 PROCEDURE — 64721 CARPAL TUNNEL SURGERY: CPT | Performed by: ORTHOPAEDIC SURGERY

## 2018-04-18 PROCEDURE — 25010000002 PROPOFOL 1000 MG/ML EMULSION: Performed by: NURSE ANESTHETIST, CERTIFIED REGISTERED

## 2018-04-18 RX ORDER — BUPIVACAINE HYDROCHLORIDE 5 MG/ML
INJECTION, SOLUTION EPIDURAL; INTRACAUDAL AS NEEDED
Status: DISCONTINUED | OUTPATIENT
Start: 2018-04-18 | End: 2018-04-18 | Stop reason: HOSPADM

## 2018-04-18 RX ORDER — CLINDAMYCIN PHOSPHATE 900 MG/50ML
900 INJECTION, SOLUTION INTRAVENOUS
Status: COMPLETED | OUTPATIENT
Start: 2018-04-19 | End: 2018-04-18

## 2018-04-18 RX ORDER — ACETAMINOPHEN 500 MG
1000 TABLET ORAL ONCE
Status: COMPLETED | OUTPATIENT
Start: 2018-04-18 | End: 2018-04-18

## 2018-04-18 RX ORDER — HYDROCODONE BITARTRATE AND ACETAMINOPHEN 5; 325 MG/1; MG/1
1-2 TABLET ORAL EVERY 6 HOURS PRN
Qty: 20 TABLET | Refills: 0 | Status: SHIPPED | OUTPATIENT
Start: 2018-04-18 | End: 2018-05-08

## 2018-04-18 RX ORDER — LIDOCAINE HYDROCHLORIDE 10 MG/ML
INJECTION, SOLUTION INFILTRATION; PERINEURAL AS NEEDED
Status: DISCONTINUED | OUTPATIENT
Start: 2018-04-18 | End: 2018-04-18 | Stop reason: HOSPADM

## 2018-04-18 RX ORDER — SODIUM CHLORIDE 0.9 % (FLUSH) 0.9 %
3 SYRINGE (ML) INJECTION AS NEEDED
Status: DISCONTINUED | OUTPATIENT
Start: 2018-04-18 | End: 2018-04-18 | Stop reason: HOSPADM

## 2018-04-18 RX ORDER — HYDROCODONE BITARTRATE AND ACETAMINOPHEN 5; 325 MG/1; MG/1
1 TABLET ORAL ONCE AS NEEDED
Status: CANCELLED | OUTPATIENT
Start: 2018-04-18 | End: 2018-04-28

## 2018-04-18 RX ORDER — SODIUM CHLORIDE, SODIUM LACTATE, POTASSIUM CHLORIDE, CALCIUM CHLORIDE 600; 310; 30; 20 MG/100ML; MG/100ML; MG/100ML; MG/100ML
1000 INJECTION, SOLUTION INTRAVENOUS CONTINUOUS
Status: DISCONTINUED | OUTPATIENT
Start: 2018-04-18 | End: 2018-04-18 | Stop reason: HOSPADM

## 2018-04-18 RX ADMIN — CLINDAMYCIN PHOSPHATE 900 MG: 900 INJECTION, SOLUTION INTRAVENOUS at 11:34

## 2018-04-18 RX ADMIN — ACETAMINOPHEN 1000 MG: 500 TABLET, FILM COATED ORAL at 10:01

## 2018-04-18 RX ADMIN — SODIUM CHLORIDE, POTASSIUM CHLORIDE, SODIUM LACTATE AND CALCIUM CHLORIDE 1000 ML: 600; 310; 30; 20 INJECTION, SOLUTION INTRAVENOUS at 09:59

## 2018-04-18 RX ADMIN — PROPOFOL 200 MCG/KG/MIN: 10 INJECTION, EMULSION INTRAVENOUS at 11:34

## 2018-04-18 NOTE — ANESTHESIA POSTPROCEDURE EVALUATION
Patient: Vanna Nicolas    Procedure Summary     Date:  04/18/18 Room / Location:  [facility] OR  /  [facility]    Anesthesia Start:  1134 Anesthesia Stop:  1153    Procedure:  ELECTIVE LEFT CARPAL TUNNEL RELEASE (Left Wrist) Diagnosis:       Carpal tunnel syndrome, bilateral      (Carpal tunnel syndrome, bilateral [G56.03])    Surgeon:  Gus Ramos MD Provider:  Jovanny Mckeon CRNA    Anesthesia Type:  MAC ASA Status:  2          Anesthesia Type: MAC  Last vitals  BP   141/79 (04/18/18 1226)   Temp   100.2 °F (37.9 °C) (04/18/18 1156)   Pulse   69 (04/18/18 1226)   Resp   16 (04/18/18 1226)     SpO2   96 % (04/18/18 1226)     Post Anesthesia Care and Evaluation    Patient location during evaluation: bedside  Patient participation: complete - patient participated  Level of consciousness: awake  Pain score: 0  Pain management: adequate  Airway patency: patent  Anesthetic complications: No anesthetic complications  PONV Status: controlled  Cardiovascular status: acceptable and stable  Respiratory status: acceptable and room air  Hydration status: acceptable

## 2018-04-18 NOTE — OP NOTE
[address], [address]  [place]  [address] [phone]      OPERATIVE REPORT           PATIENT NAME:   Vanna Nicolas                            YOB: 1975      PREOP DIAGNOSIS:             Left Carpal Tunnel Syndrome.    POSTOP DIAGNOSIS:  Left  Carpal Tunnel Syndrome.    PROCEDURE:     Left  Carpal Tunnel Release (cpt 04690)    SURGEON:    Gus Ramos M.D.    OPERATIVE TEAM:   Circulator: Jaswinder Medrano RN; Taryn Wilson RN  Scrub Person: John Wolff; Ellyn Barton    ANESTHETIST:   CRNA: Jovanny Mckeon CRNA    ANESTHESIA:   Choice    ESTIM BLOOD LOSS:  0 cc    TOURNIQUET TIME:  8  minutes @ 250 mm Hg    SPECIMENS:    None.    DRAINS:    None.    COMPLICATIONS:     None.    DISPOSITION:     Stable to recovery.    INDICATIONS AND NARRATIVE:   The patient presents for planned elective carpal tunnel release surgery to address the ongoing wrist and hand condition.  Risks and benefits of the surgery were discussed and an informed consent obtained.  Risks were discussed including, but not limited to, anesthesia risks, infection, nerve/vessel/tendon injury, chronic pain, persistent symptoms or possible recurrence of symptoms or limitations of the wrist and hand.  Goals include pain relief, improved sensation, strength, mobility and potential for improved function of the wrist and hand but no guarantee made as to these outcomes.      OPERATIVE PROCEDURE:  Antibiotic prophylaxis given.  Surgeon site marking and a time out were performed.  Anesthesia was effective and well tolerated.  The arm and hand was prepped and draped in the usual sterile fashion.      After sterile prep and drape and  arm exsanguination and with tourniquet inflation, an incision was made along the ulna border of the thenar crease of the hand in line with the radial border of the ring finger.  Careful soft tissue dissection and blunt Ragnell retractors were used  to protect the soft tissues and expose the transverse carpal tunnel ligament and protecting the superficial palmar arch.  While keeping a blunt freer under the ligament, the ligament was released proximally and distally, decompressing the median nerve and flexor tendons, taking care to identify and protect the recurrent branch of the median nerve and the superficial palmar arch with loupe magnification. All structures within the carpal tunnel could be easily visualized, and were intact within the carpal tunnel after release of the transverse carpal ligament. The wound was irrigated copiously and skin closure consisted of running 4-0 nylon sutures..The tourniquet was taken down and there was excellent hemostasis with bipolar electrocautery during the procedure and prompt return of normal perfusion at the conclusion of the procedure.  A sterile adaptic padded gauze, soft roll and two inch ace wrap forearm based dressing was applied.  Anesthesia was effective and well tolerated.  There were no complications of the procedure.  The patient was transferred in stable condition to the recovery room.

## 2018-04-18 NOTE — ANESTHESIA PREPROCEDURE EVALUATION
Anesthesia Evaluation     Patient summary reviewed and Nursing notes reviewed   NPO Solid Status: > 8 hours  NPO Liquid Status: > 8 hours           Airway   Mallampati: II  TM distance: >3 FB  Neck ROM: full  No difficulty expected  Dental    (+) poor dentation    Pulmonary - normal exam    breath sounds clear to auscultation  (+) a smoker Current Abstained day of surgery, shortness of breath, sleep apnea,   Cardiovascular - normal exam    Rhythm: regular  Rate: normal    (+) hypertension, hyperlipidemia,       Neuro/Psych  (+) headaches, numbness, psychiatric history Depression and Anxiety,     GI/Hepatic/Renal/Endo    (+) obesity,  GERD,      Musculoskeletal     Abdominal   (+) obese,    Substance History - negative use     OB/GYN negative ob/gyn ROS         Other   (+) arthritis   history of cancer    ROS/Med Hx Other: PTSD  Anesthesia record from previous CTR reviewed and no changes.  She tolerated MAC well                Anesthesia Plan    ASA 2     MAC   (1 gm PO tylenol)  intravenous induction   Anesthetic plan and risks discussed with patient.

## 2018-04-18 NOTE — H&P (VIEW-ONLY)
Subjective   Patient ID: Vanna Nicolas is a 42 y.o. female  Post-op and Pain of the Right Hand (01/10/18  Right Carpal Tunnel Release. Patient is here today to follow up on her right hand and states she is having a lot of pain, and the pain was worse yesterday but hasn't improved.)             History of Present Illness  Still complains of right thenar pain and incisional area pain but overall improved sensation in the right hand status post carpal tunnel release.    Complains of bilateral shoulder pain stiffness tightness not improved at home exercise denies recent fall or injury history of dislocation or associated neck pain or paresthesias.      Review of Systems   Constitutional: Negative for fever.   HENT: Negative for voice change.    Eyes: Negative for visual disturbance.   Respiratory: Negative for shortness of breath.    Cardiovascular: Negative for chest pain.   Gastrointestinal: Negative for abdominal distention and abdominal pain.   Genitourinary: Negative for dysuria.   Musculoskeletal: Positive for arthralgias. Negative for gait problem and joint swelling.   Skin: Negative for rash.   Neurological: Negative for speech difficulty.   Hematological: Does not bruise/bleed easily.   Psychiatric/Behavioral: Negative for confusion.       Past Medical History:   Diagnosis Date   • Anxiety and depression    • Body piercing     EARS BILATERAL   • Bursitis    • Chest pain     WITH EXCEDRINE TOO MUCH CAFFEINE   • Fibromyalgia    • GERD (gastroesophageal reflux disease)    • Hernia, umbilical    • High cholesterol    • Hypertension    • Knee pain, bilateral    • Malignant melanoma     stage 1, left leg   • Migraines    • OCD (obsessive compulsive disorder)    • PTSD (post-traumatic stress disorder)    • Sleep apnea     CPAP USAGE   • Wears glasses         Past Surgical History:   Procedure Laterality Date   • CARPAL TUNNEL RELEASE Right 1/10/2018    Procedure: ELECTIVE RIGHT CARPAL TUNNEL RELEASE;  Surgeon:  "Gus Ramos MD;  Location: [facility] OR;  Service:    • SKIN BIOPSY      STAGE 1 LEFT THIGH   • SKIN CANCER EXCISION     • TOTAL ABDOMINAL HYSTERECTOMY WITH SALPINGO OOPHORECTOMY  2004    ? Hormonal migraines       Family History   Problem Relation Age of Onset   • Rheum arthritis Mother    • Ulcerative colitis Mother    • COPD Father    • Crohn's disease Sister    • No Known Problems Maternal Aunt    • No Known Problems Maternal Uncle    • No Known Problems Paternal Aunt    • No Known Problems Paternal Uncle    • Kidney disease Maternal Grandmother    • Alzheimer's disease Maternal Grandfather    • No Known Problems Paternal Grandmother    • No Known Problems Paternal Grandfather    • Breast cancer Neg Hx    • Ovarian cancer Neg Hx        Social History     Social History   • Marital status: Single     Spouse name: N/A   • Number of children: N/A   • Years of education: N/A     Occupational History   • Not on file.     Social History Main Topics   • Smoking status: Current Every Day Smoker     Packs/day: 1.00     Years: 9.00     Types: Cigarettes   • Smokeless tobacco: Never Used   • Alcohol use No   • Drug use: No   • Sexual activity: Defer     Other Topics Concern   • Not on file     Social History Narrative   • No narrative on file       I have reviewed all of the above social hx, family hx, surgical hx, medications, allergies & ROS and confirm that it is accurate.    Allergies   Allergen Reactions   • Morphine And Related Nausea And Vomiting   • Penicillins    • Tetracyclines & Related        Objective   Resp 18   Ht 165.1 cm (65\")   Wt 106 kg (233 lb)   BMI 38.77 kg/m²    Physical Exam  Constitutional: Patient is oriented to person, place, and time. Patient appears well-developed and well-nourished.   HENT:Head: Normocephalic and atraumatic.   Eyes: EOM are normal. Pupils are equal, round, and reactive to light.   Neck: Normal range of motion. Neck supple.   Cardiovascular: Normal rate.    Pulmonary/Chest: " Effort normal and breath sounds normal.   Abdominal: Soft.   Neurological: Patient is alert and oriented to person, place, and time.   Skin: Skin is warm and dry.   Psychiatric: Patient has a normal mood and affect.   Nursing note and vitals reviewed.       Ortho Exam   Left shoulder: Mildly positive impingement sign full active range of motion negative inferior sulcus sign negative anterior apprehension sign negative tenderness anterior acromial clavicle joint or biceps tendon.    Shoulder positive impingement sign full active range of motion negative anterior sulcus sign negative anterior apprehension sign negative anterior acromial clavicle joint tenderness or bicipital tenderness.    Left hand: Positive Tinel's Phalen's and carpal compression tests, full wrist range of motion, no focal proximal carpal instability or focal tenderness hand neurovascularly intact with regard to vascular status diminished sensation median nerve distribution.    Assessment/Plan   Review of Radiographic Studies:    Radiographic images today of affected area I personally viewed and showed no sign of acute fracture or dislocation.      Large Joint Arthrocentesis  Date/Time: 4/10/2018 2:48 PM  Consent given by: patient  Site marked: site marked  Timeout: Immediately prior to procedure a time out was called to verify the correct patient, procedure, equipment, support staff and site/side marked as required   Supporting Documentation  Indications: pain   Procedure Details  Location: shoulder - R subacromial bursa  Medications administered: 2 mL lidocaine 1 %; 40 mg triamcinolone acetonide 40 MG/ML  Patient tolerance: patient tolerated the procedure well with no immediate complications    Large Joint Arthrocentesis  Date/Time: 4/10/2018 2:48 PM  Consent given by: patient  Site marked: site marked  Timeout: Immediately prior to procedure a time out was called to verify the correct patient, procedure, equipment, support staff and site/side  marked as required   Supporting Documentation  Indications: pain   Procedure Details  Location: shoulder - L subacromial bursa  Needle size: 22 G  Medications administered: 2 mL lidocaine 1 %; 40 mg triamcinolone acetonide 40 MG/ML  Patient tolerance: patient tolerated the procedure well with no immediate complications           April was seen today for post-op and pain.    Diagnoses and all orders for this visit:    Pain  -     XR Shoulder 2+ View Bilateral  -     Large Joint Arthrocentesis  -     Large Joint Arthrocentesis    Carpal tunnel syndrome, bilateral    Bursitis/tendonitis, shoulder       Physical therapy referral given      Recommendations/Plan:   Exercise, medications, injections, other patient advice, and return appointment as noted.    Patient agreeable to call or return sooner for any concerns.         I discussed with the patient the diagnosis, condition, natural history and treatment alternatives, both surgical and nonsurgical.  I reviewed the surgical procedural details using models, diagrams and reviewing x-ray findings.  I explained the nature of the operation, anesthesia methods and the postoperative recovery.  I explained risks and complications including but not limited to infection, bleeding, blood clotting, poor healing, chronic pain, stiffness, failure of the procedure, possible recurrence of the condition and anesthetic related risks.  The patient had opportunity to ask questions which were all answered to their satisfaction and decided to proceed with the plan for surgery.  I believe informed consent to proceed with the surgery was given verbally in my presence today.  The surgical consent form will be signed in the presence of the nursing staff prior to the surgery.    Impression:  Status post right hand carpal tunnel release, bilateral shoulder impingement syndrome subacromial bursitis, continued left hand carpal tunnel syndrome  Plan:  Left hand carpal tunnel release will be  scheduled, continue home exercise for both shoulders, if shoulder pain continues we'll order bilateral shoulder MRIs next visit

## 2018-04-18 NOTE — DISCHARGE INSTRUCTIONS
Please follow all post op instructions and follow up appointment time from your physician's office included in your discharge packet.  Keep the affected extremity elevated above  level of the heart.  Use your ice pack as instructed, do not use continuously.  Use your sling as directed  Follow your physicians instructions as previously directed.  Rest today  No pushing,pulling,tugging,heavy lifting, or strenuous activity   No major decision making,driving,or drinking alcoholic beverages for 24 hours due to the sedation you received  Always use good hand hygiene/washing technique  No driving on pain medication.    To assist you in voiding:  Drink plenty of fluids  Listen to running water while attempting to void.    If you are unable to urinate and you have an uncomfortable urge to void or it has been   6 hours since you were discharged, return to the Emergency Room.    Keep left hand elevated and use ice pack as directed.  Apply ice for 20-30 minutes, remove, and reapply in 2-3 hours. Do not use ice continuously.

## 2018-04-20 ENCOUNTER — HOSPITAL ENCOUNTER (OUTPATIENT)
Dept: MRI IMAGING | Facility: HOSPITAL | Age: 43
Discharge: HOME | End: 2018-04-20
Admitting: NURSE PRACTITIONER

## 2018-04-20 DIAGNOSIS — G43.019 INTRACTABLE MIGRAINE WITHOUT AURA AND WITHOUT STATUS MIGRAINOSUS: ICD-10-CM

## 2018-04-20 PROCEDURE — 0 GADOBENATE DIMEGLUMINE 529 MG/ML SOLUTION: Performed by: NURSE PRACTITIONER

## 2018-04-20 PROCEDURE — A9577 INJ MULTIHANCE: HCPCS | Performed by: NURSE PRACTITIONER

## 2018-04-20 PROCEDURE — 70553 MRI BRAIN STEM W/O & W/DYE: CPT

## 2018-04-20 RX ADMIN — GADOBENATE DIMEGLUMINE 15 ML: 529 INJECTION, SOLUTION INTRAVENOUS at 15:45

## 2018-04-26 DIAGNOSIS — E89.40 SURGICAL MENOPAUSE ON HORMONE REPLACEMENT THERAPY: ICD-10-CM

## 2018-04-26 DIAGNOSIS — Z79.890 SURGICAL MENOPAUSE ON HORMONE REPLACEMENT THERAPY: ICD-10-CM

## 2018-04-27 ENCOUNTER — OFFICE VISIT (OUTPATIENT)
Dept: NEUROLOGY | Facility: CLINIC | Age: 43
End: 2018-04-27

## 2018-04-27 VITALS
WEIGHT: 230 LBS | SYSTOLIC BLOOD PRESSURE: 138 MMHG | OXYGEN SATURATION: 98 % | DIASTOLIC BLOOD PRESSURE: 76 MMHG | HEART RATE: 108 BPM | BODY MASS INDEX: 38.32 KG/M2 | HEIGHT: 65 IN

## 2018-04-27 DIAGNOSIS — G43.109 MIGRAINE WITH AURA AND WITHOUT STATUS MIGRAINOSUS, NOT INTRACTABLE: Primary | ICD-10-CM

## 2018-04-27 PROCEDURE — 99213 OFFICE O/P EST LOW 20 MIN: CPT | Performed by: NURSE PRACTITIONER

## 2018-04-27 RX ORDER — ZONISAMIDE 100 MG/1
CAPSULE ORAL
Qty: 60 CAPSULE | Refills: 3 | Status: SHIPPED | OUTPATIENT
Start: 2018-04-27 | End: 2020-09-16

## 2018-04-27 RX ORDER — ESTRADIOL 1 MG/1
TABLET ORAL
Qty: 30 TABLET | Refills: 5 | Status: SHIPPED | OUTPATIENT
Start: 2018-04-27 | End: 2018-05-08 | Stop reason: SDUPTHER

## 2018-05-01 ENCOUNTER — OFFICE VISIT (OUTPATIENT)
Dept: ORTHOPEDIC SURGERY | Facility: CLINIC | Age: 43
End: 2018-05-01

## 2018-05-01 VITALS — WEIGHT: 230 LBS | HEIGHT: 65 IN | BODY MASS INDEX: 38.32 KG/M2 | RESPIRATION RATE: 18 BRPM

## 2018-05-01 DIAGNOSIS — G56.03 BILATERAL CARPAL TUNNEL SYNDROME: Primary | ICD-10-CM

## 2018-05-01 PROCEDURE — 99024 POSTOP FOLLOW-UP VISIT: CPT | Performed by: ORTHOPAEDIC SURGERY

## 2018-05-01 NOTE — PROGRESS NOTES
Subjective   Patient ID: Vanna Nicolas is a 43 y.o. female  Pain and Post-op of the Left Hand (04/18/18 Left  Carpal Tunnel Release. Patient states her hand is still numb and and under her thumb and down  Her index finger it burns really bad like a sun burn. ) and Suture / Staple Removal             History of Present Illness    Still complains of loss of feeling on the radial side of her index finger and the ulnar side of her thumb finger but overall incisional area well healing no drainage pain really satisfactory.    Review of Systems   Constitutional: Negative for fever.   HENT: Negative for voice change.    Eyes: Negative for visual disturbance.   Respiratory: Negative for shortness of breath.    Cardiovascular: Negative for chest pain.   Gastrointestinal: Negative for abdominal distention and abdominal pain.   Genitourinary: Negative for dysuria.   Musculoskeletal: Positive for arthralgias. Negative for gait problem and joint swelling.   Skin: Negative for rash.   Neurological: Negative for speech difficulty.   Hematological: Does not bruise/bleed easily.   Psychiatric/Behavioral: Negative for confusion.       Past Medical History:   Diagnosis Date   • Anxiety and depression    • Body piercing     EARS BILATERAL   • Bursitis    • Chest pain     WITH EXCEDRINE TOO MUCH CAFFEINE   • Diarrhea    • Fibromyalgia    • GERD (gastroesophageal reflux disease)    • Hernia, umbilical    • High cholesterol    • Hip pain, acute, right    • Hypertension    • IBS (irritable bowel syndrome)    • Knee pain, bilateral    • Malignant melanoma     stage 1, left leg   • Migraines    • OCD (obsessive compulsive disorder)    • PTSD (post-traumatic stress disorder)    • Sleep apnea     CPAP USAGE   • Wears glasses         Past Surgical History:   Procedure Laterality Date   • CARPAL TUNNEL RELEASE Right 1/10/2018    Procedure: ELECTIVE RIGHT CARPAL TUNNEL RELEASE;  Surgeon: Gus Ramos MD;  Location: [facility];  Service:     • CARPAL TUNNEL RELEASE Left 4/18/2018    Procedure: ELECTIVE LEFT CARPAL TUNNEL RELEASE;  Surgeon: Gus Ramos MD;  Location: [facility];  Service: Orthopedics   • CARPAL TUNNEL RELEASE     • COLONOSCOPY     • SKIN BIOPSY      STAGE 1 LEFT THIGH   • SKIN CANCER EXCISION     • TOTAL ABDOMINAL HYSTERECTOMY WITH SALPINGO OOPHORECTOMY  2004    ? Hormonal migraines       Family History   Problem Relation Age of Onset   • Rheum arthritis Mother    • Ulcerative colitis Mother    • COPD Father    • Crohn's disease Sister    • No Known Problems Maternal Aunt    • No Known Problems Maternal Uncle    • No Known Problems Paternal Aunt    • No Known Problems Paternal Uncle    • Kidney disease Maternal Grandmother    • Alzheimer's disease Maternal Grandfather    • No Known Problems Paternal Grandmother    • No Known Problems Paternal Grandfather    • Breast cancer Neg Hx    • Ovarian cancer Neg Hx        Social History     Social History   • Marital status:      Spouse name: N/A   • Number of children: N/A   • Years of education: N/A     Occupational History   • Not on file.     Social History Main Topics   • Smoking status: Current Every Day Smoker     Packs/day: 1.00     Years: 9.00     Types: Cigarettes   • Smokeless tobacco: Never Used   • Alcohol use No   • Drug use: No   • Sexual activity: Defer     Other Topics Concern   • Not on file     Social History Narrative   • No narrative on file       I have reviewed all of the above social hx, family hx, surgical hx, medications, allergies & ROS and confirm that it is accurate.    Allergies   Allergen Reactions   • Penicillins Unknown (See Comments)     'AS A BABY ALMOST PUT ME INTO A COMA'   • Morphine And Related Nausea And Vomiting   • Tetracyclines & Related Hives         Current Outpatient Prescriptions:   •  cyclobenzaprine (FLEXERIL) 10 MG tablet, Take 1 tablet by mouth 2 (Two) Times a Day As Needed for Muscle Spasms., Disp: 30 tablet, Rfl: 1  •  diclofenac  "(VOLTAREN) 75 MG EC tablet, TAKE 1 TABLET BY MOUTH TWO TIMES A DAY AS NEEDED for back pain, Disp: 60 tablet, Rfl: 5  •  diphenoxylate-atropine (LOMOTIL) 2.5-0.025 MG per tablet, Take 2 tablets by mouth 4 (Four) Times a Day As Needed for Diarrhea., Disp: , Rfl: 1  •  divalproex (DEPAKOTE) 500 MG 24 hr tablet, Take 1 tablet by mouth Every Night., Disp: 90 tablet, Rfl: 1  •  estradiol (ESTRACE) 1 MG tablet, TAKE 1 TABLET BY MOUTH ONE TIME A DAY , Disp: 30 tablet, Rfl: 5  •  furosemide (LASIX) 10 MG half tablet, Take 20 mg by mouth Daily As Needed (SWELLING)., Disp: , Rfl:   •  gabapentin (NEURONTIN) 600 MG tablet, Take 1 tablet by mouth 3 (Three) Times a Day As Needed (nerve pain, fibromyalgia)., Disp: 90 tablet, Rfl: 1  •  HYDROcodone-acetaminophen (NORCO) 5-325 MG per tablet, Take 1-2 tablets by mouth Every 6 (Six) Hours As Needed for Moderate Pain, Disp: 20 tablet, Rfl: 0  •  hydroxychloroquine (PLAQUENIL) 200 MG tablet, Take 1 tablet by mouth Daily., Disp: 30 tablet, Rfl: 2  •  potassium chloride (K-DUR,KLOR-CON) 20 MEQ CR tablet, Take 20 mEq by mouth Daily As Needed (TAKES WHEN TAKING LASIX)., Disp: , Rfl:   •  potassium chloride (MICRO-K) 10 MEQ CR capsule, , Disp: , Rfl: 1  •  pramipexole (MIRAPEX) 1.5 MG tablet, Take 1 tablet by mouth Every Night., Disp: 30 tablet, Rfl: 2  •  promethazine (PHENERGAN) 25 MG tablet, Take 1 tablet by mouth Daily As Needed (migraine)., Disp: 30 tablet, Rfl: 1  •  sertraline (ZOLOFT) 100 MG tablet, Take 1 tablet by mouth Daily., Disp: 30 tablet, Rfl: 2  •  temazepam (RESTORIL) 30 MG capsule, Take 1 capsule by mouth At Night As Needed for Sleep., Disp: 30 capsule, Rfl: 2  •  triamterene-hydrochlorothiazide (MAXZIDE-25) 37.5-25 MG per tablet, Take 1 tablet by mouth Daily., Disp: 30 tablet, Rfl: 5  •  zonisamide (ZONEGRAN) 100 MG capsule, Take one tablet at bed for one week, then increase to two tablets at bed, Disp: 60 capsule, Rfl: 3    Objective   Resp 18   Ht 165.1 cm (65\")   Wt " 104 kg (230 lb)   BMI 38.27 kg/m²    Physical Exam  Constitutional: Patient is oriented to person, place, and time. Patient appears well-developed and well-nourished.   HENT:Head: Normocephalic and atraumatic.   Eyes: EOM are normal. Pupils are equal, round, and reactive to light.   Neck: Normal range of motion. Neck supple.   Cardiovascular: Normal rate.    Pulmonary/Chest: Effort normal and breath sounds normal.   Abdominal: Soft.   Neurological: Patient is alert and oriented to person, place, and time.   Skin: Skin is warm and dry.   Psychiatric: Patient has a normal mood and affect.   Nursing note and vitals reviewed.       Ortho Exam   Left hand incision well-healed sutures removed Steri-Strips applied, diminished sensation on the radial border of the index finger and ulnar side of the thumb but intact motor control throughout, good circulation of the fingertips.  Wrist range of motion is somewhat restricted but appears to be consistent with prior surgery.    Assessment/Plan   Review of Radiographic Studies:    No new imaging done today.      Procedures     April was seen today for suture / staple removal, pain and post-op.    Diagnoses and all orders for this visit:    Bilateral carpal tunnel syndrome       Physical therapy referral given and Work status form completed and provided to patient      Recommendations/Plan:   Exercise, medications, injections, other patient advice, and return appointment as noted.    Patient agreeable to call or return sooner for any concerns.             Impression:   Status post left hand carpal tunnel release  Plan:  Therapy remain out of work recheck 4 weeks at that time consider release to work activities

## 2018-05-07 ENCOUNTER — PATIENT MESSAGE (OUTPATIENT)
Dept: INTERNAL MEDICINE | Facility: CLINIC | Age: 43
End: 2018-05-07

## 2018-05-08 ENCOUNTER — OFFICE VISIT (OUTPATIENT)
Dept: INTERNAL MEDICINE | Facility: CLINIC | Age: 43
End: 2018-05-08

## 2018-05-08 VITALS
SYSTOLIC BLOOD PRESSURE: 150 MMHG | OXYGEN SATURATION: 98 % | TEMPERATURE: 98.3 F | DIASTOLIC BLOOD PRESSURE: 88 MMHG | BODY MASS INDEX: 39.38 KG/M2 | WEIGHT: 236.38 LBS | HEIGHT: 65 IN | HEART RATE: 87 BPM

## 2018-05-08 DIAGNOSIS — F33.1 MODERATE EPISODE OF RECURRENT MAJOR DEPRESSIVE DISORDER (HCC): ICD-10-CM

## 2018-05-08 DIAGNOSIS — Z79.890 SURGICAL MENOPAUSE ON HORMONE REPLACEMENT THERAPY: ICD-10-CM

## 2018-05-08 DIAGNOSIS — G43.109 MIGRAINE WITH AURA AND WITHOUT STATUS MIGRAINOSUS, NOT INTRACTABLE: Primary | ICD-10-CM

## 2018-05-08 DIAGNOSIS — E89.40 SURGICAL MENOPAUSE ON HORMONE REPLACEMENT THERAPY: ICD-10-CM

## 2018-05-08 PROCEDURE — 96372 THER/PROPH/DIAG INJ SC/IM: CPT | Performed by: FAMILY MEDICINE

## 2018-05-08 PROCEDURE — 99214 OFFICE O/P EST MOD 30 MIN: CPT | Performed by: FAMILY MEDICINE

## 2018-05-08 RX ORDER — PROMETHAZINE HYDROCHLORIDE 25 MG/ML
25 INJECTION, SOLUTION INTRAMUSCULAR; INTRAVENOUS ONCE
Status: COMPLETED | OUTPATIENT
Start: 2018-05-08 | End: 2018-05-08

## 2018-05-08 RX ORDER — SERTRALINE HYDROCHLORIDE 100 MG/1
150 TABLET, FILM COATED ORAL DAILY
Qty: 45 TABLET | Refills: 5 | Status: SHIPPED | OUTPATIENT
Start: 2018-05-08 | End: 2020-09-16

## 2018-05-08 RX ORDER — ESTRADIOL 1 MG/1
1 TABLET ORAL DAILY
Qty: 30 TABLET | Refills: 5 | Status: SHIPPED | OUTPATIENT
Start: 2018-05-08

## 2018-05-08 RX ORDER — SERTRALINE HYDROCHLORIDE 100 MG/1
100 TABLET, FILM COATED ORAL DAILY
Qty: 30 TABLET | Refills: 5 | Status: SHIPPED | OUTPATIENT
Start: 2018-05-08 | End: 2018-05-08 | Stop reason: SDUPTHER

## 2018-05-08 RX ORDER — KETOROLAC TROMETHAMINE 30 MG/ML
60 INJECTION, SOLUTION INTRAMUSCULAR; INTRAVENOUS ONCE
Status: DISCONTINUED | OUTPATIENT
Start: 2018-05-08 | End: 2018-05-14

## 2018-05-08 RX ADMIN — KETOROLAC TROMETHAMINE 60 MG: 30 INJECTION, SOLUTION INTRAMUSCULAR; INTRAVENOUS at 15:54

## 2018-05-08 RX ADMIN — PROMETHAZINE HYDROCHLORIDE 25 MG: 25 INJECTION, SOLUTION INTRAMUSCULAR; INTRAVENOUS at 12:05

## 2018-05-08 NOTE — PROGRESS NOTES
SUBJECTIVE: Vanna Nicolas is a 43 y.o. female seen for a follow up visit;          Migraines: have been worsening again - started zonegran and started getting headaches every morning rather than once a week.  She took it last night and only just connected the two.  She denies worsening of the headaches w/ barometric pressure drops over the past few days (ie nights of 5/4 and 5/6).  Has photophobia, nausea.  She hasn't tried the nasal imitrex because when she takes it she gets too dizzy to tolerate it.      CTS: just had left CTS done, thumb numbness hasn't resolved but pain better.  Right hand is better.        The following portions of the patient's history were reviewed and updated as appropriate: She  has a past medical history of Anxiety and depression; Body piercing; Bursitis; Chest pain; Diarrhea; Fibromyalgia; GERD (gastroesophageal reflux disease); Hernia, umbilical; High cholesterol; Hip pain, acute, right; Hypertension; IBS (irritable bowel syndrome); Knee pain, bilateral; Malignant melanoma; Migraines; OCD (obsessive compulsive disorder); PTSD (post-traumatic stress disorder); Sleep apnea; and Wears glasses.  She has Moderate episode of recurrent major depressive disorder; Migraine with aura and without status migrainosus, not intractable; Anxiety; and Surgical menopause on hormone replacement therapy on her pertinent problem list.  She  has a past surgical history that includes Skin cancer excision; Total abdominal hysterectomy w/ bilateral salpingoophorectomy (2004); Skin biopsy; Carpal tunnel release (Right, 1/10/2018); Colonoscopy; Carpal tunnel release (Left, 4/18/2018); and Carpal tunnel release.  Her family history includes Alzheimer's disease in her maternal grandfather; COPD in her father; Crohn's disease in her sister; Kidney disease in her maternal grandmother; No Known Problems in her maternal aunt, maternal uncle, paternal aunt, paternal grandfather, paternal grandmother, and paternal  "uncle; Rheum arthritis in her mother; Ulcerative colitis in her mother.  She  reports that she has been smoking Cigarettes.  She has a 9.00 pack-year smoking history. She has never used smokeless tobacco. She reports that she does not drink alcohol or use drugs.  She has a current medication list which includes the following prescription(s): cyclobenzaprine, diclofenac, diphenoxylate-atropine, divalproex, estradiol, furosemide, gabapentin, hydroxychloroquine, potassium chloride, pramipexole, promethazine, sertraline, temazepam, triamterene-hydrochlorothiazide, zonisamide, and potassium chloride..    Review of Systems   Constitutional: Positive for fatigue.   Respiratory: Negative.    Cardiovascular: Negative.    Neurological: Positive for headaches.         OBJECTIVE:  /88   Pulse 87   Temp 98.3 °F (36.8 °C)   Ht 165.1 cm (65\")   Wt 107 kg (236 lb 6 oz)   SpO2 98%   BMI 39.33 kg/m²      Physical Exam   Constitutional: She appears well-developed and well-nourished. No distress.           ASSESSMENT:   Diagnosis Plan   1. Migraine with aura and without status migrainosus, not intractable  promethazine (PHENERGAN) injection 25 mg    ketorolac (TORADOL) injection 60 mg   2. Surgical menopause on hormone replacement therapy  estradiol (ESTRACE) 1 MG tablet   3. Moderate episode of recurrent major depressive disorder  sertraline (ZOLOFT) 100 MG tablet     Recommended cutting estrogen - migraines could be partially caused by or worsening because of it.        Medications Discontinued During This Encounter   Medication Reason   • HYDROcodone-acetaminophen (NORCO) 5-325 MG per tablet *Therapy completed   • estradiol (ESTRACE) 1 MG tablet Reorder   • sertraline (ZOLOFT) 100 MG tablet Reorder   • sertraline (ZOLOFT) 100 MG tablet Reorder   • ketorolac (TORADOL) injection 60 mg          Fouzia KLEIN MD, hereby attest that the medical record entry above accurately reflects signatures/notations that I made in " my capacity as Fouzia Fuentes MD when I treated and diagnosed the above patient.  I do hereby attest that his information is true, accurate, and complete to the best of my knowledge and I understand that any falsification, omission, or concealment of material fact may subject me to administrative, civil, or criminal liability.

## 2018-05-08 NOTE — TELEPHONE ENCOUNTER
From: April Judy Nicolas  To: Fouzia Fuentes MD  Sent: 5/7/2018 11:15 AM EDT  Subject: Prescription Question    I need refills called in for Sertraline Hcl 100 mg. And Estradiol 1 mg.

## 2018-05-14 RX ORDER — KETOROLAC TROMETHAMINE 30 MG/ML
60 INJECTION, SOLUTION INTRAMUSCULAR; INTRAVENOUS ONCE
Status: COMPLETED | OUTPATIENT
Start: 2018-05-14 | End: 2018-05-08

## 2018-05-16 PROBLEM — S06.9XAA TBI (TRAUMATIC BRAIN INJURY) (HCC): Status: ACTIVE | Noted: 2018-05-16

## 2018-05-29 ENCOUNTER — OFFICE VISIT (OUTPATIENT)
Dept: ORTHOPEDIC SURGERY | Facility: CLINIC | Age: 43
End: 2018-05-29

## 2018-05-29 VITALS — RESPIRATION RATE: 18 BRPM | HEIGHT: 65 IN | WEIGHT: 236 LBS | BODY MASS INDEX: 39.32 KG/M2

## 2018-05-29 DIAGNOSIS — G56.03 CARPAL TUNNEL SYNDROME, BILATERAL: Primary | ICD-10-CM

## 2018-05-29 PROCEDURE — 99024 POSTOP FOLLOW-UP VISIT: CPT | Performed by: ORTHOPAEDIC SURGERY

## 2018-05-29 NOTE — PROGRESS NOTES
Subjective   Patient ID: April Judy Nicolas is a 43 y.o. female  Follow-up, Pain, and Numbness of the Left Hand (Patient is here to follow up on her left had CTR surgery from April 2018 and states her hand only hurts when she tries to use it, the hand is still weak, her thumb and index finger are still numb.)             History of Present Illness    Still having loss of feeling on the thumb and index fingers but good improved range of motion responded well to therapy less pain since surgery incisional area little lumpy but she's applying topical vitamin E oil.    Review of Systems   Constitutional: Negative for fever.   HENT: Negative for voice change.    Eyes: Negative for visual disturbance.   Respiratory: Negative for shortness of breath.    Cardiovascular: Negative for chest pain.   Gastrointestinal: Negative for abdominal distention and abdominal pain.   Genitourinary: Negative for dysuria.   Musculoskeletal: Positive for arthralgias. Negative for gait problem and joint swelling.   Skin: Negative for rash.   Neurological: Negative for speech difficulty.   Hematological: Does not bruise/bleed easily.   Psychiatric/Behavioral: Negative for confusion.       Past Medical History:   Diagnosis Date   • Anxiety and depression    • Body piercing     EARS BILATERAL   • Bursitis    • Chest pain     WITH EXCEDRINE TOO MUCH CAFFEINE   • Diarrhea    • Fibromyalgia    • GERD (gastroesophageal reflux disease)    • Hernia, umbilical    • High cholesterol    • Hip pain, acute, right    • Hypertension    • IBS (irritable bowel syndrome)    • Knee pain, bilateral    • Malignant melanoma     stage 1, left leg   • Migraines    • OCD (obsessive compulsive disorder)    • PTSD (post-traumatic stress disorder)    • Sleep apnea     CPAP USAGE   • Wears glasses         Past Surgical History:   Procedure Laterality Date   • CARPAL TUNNEL RELEASE Right 1/10/2018    Procedure: ELECTIVE RIGHT CARPAL TUNNEL RELEASE;  Surgeon: Gus HOGUE  MD Richard;  Location: [facility];  Service:    • CARPAL TUNNEL RELEASE Left 4/18/2018    Procedure: ELECTIVE LEFT CARPAL TUNNEL RELEASE;  Surgeon: Gus Ramos MD;  Location: [facility];  Service: Orthopedics   • CARPAL TUNNEL RELEASE     • COLONOSCOPY     • SKIN BIOPSY      STAGE 1 LEFT THIGH   • SKIN CANCER EXCISION     • TOTAL ABDOMINAL HYSTERECTOMY WITH SALPINGO OOPHORECTOMY  2004    ? Hormonal migraines       Family History   Problem Relation Age of Onset   • Rheum arthritis Mother    • Ulcerative colitis Mother    • COPD Father    • Crohn's disease Sister    • No Known Problems Maternal Aunt    • No Known Problems Maternal Uncle    • No Known Problems Paternal Aunt    • No Known Problems Paternal Uncle    • Kidney disease Maternal Grandmother    • Alzheimer's disease Maternal Grandfather    • No Known Problems Paternal Grandmother    • No Known Problems Paternal Grandfather    • Breast cancer Neg Hx    • Ovarian cancer Neg Hx        Social History     Social History   • Marital status:      Spouse name: N/A   • Number of children: N/A   • Years of education: N/A     Occupational History   • Not on file.     Social History Main Topics   • Smoking status: Current Every Day Smoker     Packs/day: 1.00     Years: 9.00     Types: Cigarettes   • Smokeless tobacco: Never Used   • Alcohol use No   • Drug use: No   • Sexual activity: Defer     Other Topics Concern   • Not on file     Social History Narrative   • No narrative on file       I have reviewed all of the above social hx, family hx, surgical hx, medications, allergies & ROS and confirm that it is accurate.    Allergies   Allergen Reactions   • Penicillins Unknown (See Comments)     'AS A BABY ALMOST PUT ME INTO A COMA'   • Morphine And Related Nausea And Vomiting   • Tetracyclines & Related Hives         Current Outpatient Prescriptions:   •  cyclobenzaprine (FLEXERIL) 10 MG tablet, Take 1 tablet by mouth 2 (Two) Times a Day As Needed for Muscle  "Spasms., Disp: 30 tablet, Rfl: 1  •  diclofenac (VOLTAREN) 75 MG EC tablet, TAKE 1 TABLET BY MOUTH TWO TIMES A DAY AS NEEDED for back pain, Disp: 60 tablet, Rfl: 5  •  diphenoxylate-atropine (LOMOTIL) 2.5-0.025 MG per tablet, Take 2 tablets by mouth 4 (Four) Times a Day As Needed for Diarrhea., Disp: , Rfl: 1  •  divalproex (DEPAKOTE) 500 MG 24 hr tablet, Take 1 tablet by mouth Every Night., Disp: 90 tablet, Rfl: 1  •  estradiol (ESTRACE) 1 MG tablet, Take 1 tablet by mouth Daily. Take 1/2 tablet to 1 tablet by mouth daily, Disp: 30 tablet, Rfl: 5  •  furosemide (LASIX) 10 MG half tablet, Take 20 mg by mouth Daily As Needed (SWELLING)., Disp: , Rfl:   •  gabapentin (NEURONTIN) 600 MG tablet, Take 1 tablet by mouth 3 (Three) Times a Day As Needed (nerve pain, fibromyalgia)., Disp: 90 tablet, Rfl: 1  •  hydroxychloroquine (PLAQUENIL) 200 MG tablet, Take 1 tablet by mouth Daily., Disp: 30 tablet, Rfl: 2  •  potassium chloride (K-DUR,KLOR-CON) 20 MEQ CR tablet, Take 20 mEq by mouth Daily As Needed (TAKES WHEN TAKING LASIX)., Disp: , Rfl:   •  potassium chloride (MICRO-K) 10 MEQ CR capsule, , Disp: , Rfl: 1  •  pramipexole (MIRAPEX) 1.5 MG tablet, Take 1 tablet by mouth Every Night., Disp: 30 tablet, Rfl: 2  •  promethazine (PHENERGAN) 25 MG tablet, Take 1 tablet by mouth Daily As Needed (migraine)., Disp: 30 tablet, Rfl: 1  •  sertraline (ZOLOFT) 100 MG tablet, Take 1.5 tablets by mouth Daily., Disp: 45 tablet, Rfl: 5  •  temazepam (RESTORIL) 30 MG capsule, Take 1 capsule by mouth At Night As Needed for Sleep., Disp: 30 capsule, Rfl: 2  •  triamterene-hydrochlorothiazide (MAXZIDE-25) 37.5-25 MG per tablet, Take 1 tablet by mouth Daily., Disp: 30 tablet, Rfl: 5  •  zonisamide (ZONEGRAN) 100 MG capsule, Take one tablet at bed for one week, then increase to two tablets at bed, Disp: 60 capsule, Rfl: 3    Objective   Resp 18   Ht 165.1 cm (65\")   Wt 107 kg (236 lb)   BMI 39.27 kg/m²    Physical Exam  Constitutional: " Patient is oriented to person, place, and time. Patient appears well-developed and well-nourished.   HENT:Head: Normocephalic and atraumatic.   Eyes: EOM are normal. Pupils are equal, round, and reactive to light.   Neck: Normal range of motion. Neck supple.   Cardiovascular: Normal rate.    Pulmonary/Chest: Effort normal and breath sounds normal.   Abdominal: Soft.   Neurological: Patient is alert and oriented to person, place, and time.   Skin: Skin is warm and dry.   Psychiatric: Patient has a normal mood and affect.   Nursing note and vitals reviewed.       Ortho Exam   Left hand surgical incision well healed very slight sensitivity along the scar line, full range of motion all fingers, negative Tinel sign over the median nerve at the wrist, the meniscus sensation thumb and index fingertips but full active range of motion.    Assessment/Plan   Review of Radiographic Studies:    No new imaging done today.      Procedures     April was seen today for follow-up, pain and numbness.    Diagnoses and all orders for this visit:    Carpal tunnel syndrome, bilateral       Work status form completed and provided to patient      Recommendations/Plan:   Work/Activity Status: Unable to return to work until next evaluation    Patient agreeable to call or return sooner for any concerns.             Impression:  Status post left hand carpal tunnel release  4/18/18 with residual paresthesias thumb and index fingers  Plan:  Continue out of work status recheck 1 month for possible release to [email or web address] work at [employer]

## 2018-06-15 RX ORDER — HYDROXYCHLOROQUINE SULFATE 200 MG/1
200 TABLET, FILM COATED ORAL DAILY
Qty: 30 TABLET | Refills: 2 | Status: SHIPPED | OUTPATIENT
Start: 2018-06-15

## 2018-06-19 DIAGNOSIS — M79.7 FIBROMYALGIA: ICD-10-CM

## 2018-06-19 RX ORDER — GABAPENTIN 600 MG/1
600 TABLET ORAL 3 TIMES DAILY PRN
Qty: 90 TABLET | Refills: 3 | Status: SHIPPED | OUTPATIENT
Start: 2018-06-19 | End: 2018-07-02 | Stop reason: SDUPTHER

## 2018-06-19 NOTE — TELEPHONE ENCOUNTER
----- Message from Vanna Nicolas sent at 6/18/2018 10:39 PM EDT -----  Regarding: Prescription Question  Contact: [phone]  I need a refill called in for Gabapentin 600 mg.

## 2018-06-22 DIAGNOSIS — M79.671 BILATERAL FOOT PAIN: Primary | ICD-10-CM

## 2018-06-22 DIAGNOSIS — M79.672 BILATERAL FOOT PAIN: Primary | ICD-10-CM

## 2018-06-26 ENCOUNTER — OFFICE VISIT (OUTPATIENT)
Dept: ORTHOPEDIC SURGERY | Facility: CLINIC | Age: 43
End: 2018-06-26

## 2018-06-26 VITALS — RESPIRATION RATE: 18 BRPM | HEIGHT: 65 IN | BODY MASS INDEX: 40.15 KG/M2 | WEIGHT: 241 LBS

## 2018-06-26 DIAGNOSIS — G43.119 INTRACTABLE MIGRAINE WITH AURA WITHOUT STATUS MIGRAINOSUS: ICD-10-CM

## 2018-06-26 DIAGNOSIS — G89.29 HIP PAIN, CHRONIC, RIGHT: ICD-10-CM

## 2018-06-26 DIAGNOSIS — G56.03 CARPAL TUNNEL SYNDROME, BILATERAL: ICD-10-CM

## 2018-06-26 DIAGNOSIS — M25.562 ARTHRALGIA OF LEFT KNEE: Primary | ICD-10-CM

## 2018-06-26 DIAGNOSIS — M25.551 HIP PAIN, CHRONIC, RIGHT: ICD-10-CM

## 2018-06-26 DIAGNOSIS — G25.81 RESTLESS LEG SYNDROME: ICD-10-CM

## 2018-06-26 PROCEDURE — 99024 POSTOP FOLLOW-UP VISIT: CPT | Performed by: ORTHOPAEDIC SURGERY

## 2018-06-26 PROCEDURE — 99213 OFFICE O/P EST LOW 20 MIN: CPT | Performed by: ORTHOPAEDIC SURGERY

## 2018-06-26 PROCEDURE — 20610 DRAIN/INJ JOINT/BURSA W/O US: CPT | Performed by: ORTHOPAEDIC SURGERY

## 2018-06-26 RX ORDER — TEMAZEPAM 30 MG/1
CAPSULE ORAL
Qty: 30 CAPSULE | Refills: 2 | Status: SHIPPED | OUTPATIENT
Start: 2018-06-26 | End: 2020-09-16

## 2018-06-26 RX ORDER — LIDOCAINE HYDROCHLORIDE 10 MG/ML
2 INJECTION, SOLUTION INFILTRATION; PERINEURAL
Status: COMPLETED | OUTPATIENT
Start: 2018-06-26 | End: 2018-06-26

## 2018-06-26 RX ORDER — TRIAMCINOLONE ACETONIDE 40 MG/ML
40 INJECTION, SUSPENSION INTRA-ARTICULAR; INTRAMUSCULAR
Status: COMPLETED | OUTPATIENT
Start: 2018-06-26 | End: 2018-06-26

## 2018-06-26 RX ADMIN — LIDOCAINE HYDROCHLORIDE 2 ML: 10 INJECTION, SOLUTION INFILTRATION; PERINEURAL at 13:31

## 2018-06-26 RX ADMIN — TRIAMCINOLONE ACETONIDE 40 MG: 40 INJECTION, SUSPENSION INTRA-ARTICULAR; INTRAMUSCULAR at 13:31

## 2018-06-26 NOTE — PROGRESS NOTES
Subjective   Patient ID: April Judy Nicolas is a 43 y.o. female  Follow-up, Numbness, and Pain of the Left Hand (Patient states her incision is still very tender and thumb and index finger are still numb. She states she doesn't have much strength in that hand so it's hard for her to open a gallon of milk or others things.)             History of Present Illness    She returns for 3 different problems one is a postoperative left carpal tunnel for which she still has decreased sensation on the radial border of the index finger and thumb, no pain in the wrist and full use of the wrist but feels weak at times.  The problem is pain in her right hip which is not improving with therapy x-rays in April were normal she does not recall specific injury but she was climbing up in an attic in March or April thinks this might have aggravated her hip.  Denies paresthesias in the right lower leg pain in the right hip is more deeper down in the groin hurts to stand and twist.  Her therapist things maybe she has a labral or tendinitis problem of the hip.    Other problems her left knee which pops and grinds pain is anterior worse with bending squatting and stairs, no giving way, no associated paresthesias or ipsilateral hip or ankle pain.    Review of Systems   Constitutional: Negative for fever.   HENT: Negative for voice change.    Eyes: Negative for visual disturbance.   Respiratory: Negative for shortness of breath.    Cardiovascular: Negative for chest pain.   Gastrointestinal: Negative for abdominal distention and abdominal pain.   Genitourinary: Negative for dysuria.   Musculoskeletal: Positive for arthralgias. Negative for gait problem and joint swelling.   Skin: Negative for rash.   Neurological: Negative for speech difficulty.   Hematological: Does not bruise/bleed easily.   Psychiatric/Behavioral: Negative for confusion.       Past Medical History:   Diagnosis Date   • Anxiety and depression    • Body piercing     EARS  BILATERAL   • Bursitis    • Chest pain     WITH EXCEDRINE TOO MUCH CAFFEINE   • Diarrhea    • Fibromyalgia    • GERD (gastroesophageal reflux disease)    • Hernia, umbilical    • High cholesterol    • Hip pain, acute, right    • Hypertension    • IBS (irritable bowel syndrome)    • Knee pain, bilateral    • Malignant melanoma     stage 1, left leg   • Migraines    • OCD (obsessive compulsive disorder)    • PTSD (post-traumatic stress disorder)    • Sleep apnea     CPAP USAGE   • Wears glasses         Past Surgical History:   Procedure Laterality Date   • CARPAL TUNNEL RELEASE Right 1/10/2018    Procedure: ELECTIVE RIGHT CARPAL TUNNEL RELEASE;  Surgeon: Gus Ramos MD;  Location: [facility];  Service:    • CARPAL TUNNEL RELEASE Left 4/18/2018    Procedure: ELECTIVE LEFT CARPAL TUNNEL RELEASE;  Surgeon: Gus Ramos MD;  Location: [facility];  Service: Orthopedics   • CARPAL TUNNEL RELEASE     • COLONOSCOPY     • SKIN BIOPSY      STAGE 1 LEFT THIGH   • SKIN CANCER EXCISION     • TOTAL ABDOMINAL HYSTERECTOMY WITH SALPINGO OOPHORECTOMY  2004    ? Hormonal migraines       Family History   Problem Relation Age of Onset   • Rheum arthritis Mother    • Ulcerative colitis Mother    • COPD Father    • Crohn's disease Sister    • No Known Problems Maternal Aunt    • No Known Problems Maternal Uncle    • No Known Problems Paternal Aunt    • No Known Problems Paternal Uncle    • Kidney disease Maternal Grandmother    • Alzheimer's disease Maternal Grandfather    • No Known Problems Paternal Grandmother    • No Known Problems Paternal Grandfather    • Breast cancer Neg Hx    • Ovarian cancer Neg Hx        Social History     Social History   • Marital status:      Spouse name: N/A   • Number of children: N/A   • Years of education: N/A     Occupational History   • Not on file.     Social History Main Topics   • Smoking status: Current Every Day Smoker     Packs/day: 1.00     Years: 9.00     Types: Cigarettes   •  Smokeless tobacco: Never Used   • Alcohol use No   • Drug use: No   • Sexual activity: Defer     Other Topics Concern   • Not on file     Social History Narrative   • No narrative on file       I have reviewed all of the above social hx, family hx, surgical hx, medications, allergies & ROS and confirm that it is accurate.    Allergies   Allergen Reactions   • Penicillins Unknown (See Comments)     'AS A BABY ALMOST PUT ME INTO A COMA'   • Morphine And Related Nausea And Vomiting   • Tetracyclines & Related Hives         Current Outpatient Prescriptions:   •  cyclobenzaprine (FLEXERIL) 10 MG tablet, Take 1 tablet by mouth 2 (Two) Times a Day As Needed for Muscle Spasms., Disp: 30 tablet, Rfl: 1  •  diclofenac (VOLTAREN) 75 MG EC tablet, TAKE 1 TABLET BY MOUTH TWO TIMES A DAY AS NEEDED for back pain, Disp: 60 tablet, Rfl: 5  •  diphenoxylate-atropine (LOMOTIL) 2.5-0.025 MG per tablet, Take 2 tablets by mouth 4 (Four) Times a Day As Needed for Diarrhea., Disp: , Rfl: 1  •  divalproex (DEPAKOTE) 500 MG 24 hr tablet, Take 1 tablet by mouth Every Night., Disp: 90 tablet, Rfl: 1  •  estradiol (ESTRACE) 1 MG tablet, Take 1 tablet by mouth Daily. Take 1/2 tablet to 1 tablet by mouth daily, Disp: 30 tablet, Rfl: 5  •  furosemide (LASIX) 10 MG half tablet, Take 20 mg by mouth Daily As Needed (SWELLING)., Disp: , Rfl:   •  gabapentin (NEURONTIN) 600 MG tablet, Take 1 tablet by mouth 3 (Three) Times a Day As Needed (nerve pain, fibromyalgia)., Disp: 90 tablet, Rfl: 3  •  hydroxychloroquine (PLAQUENIL) 200 MG tablet, Take 1 tablet by mouth Daily., Disp: 30 tablet, Rfl: 2  •  potassium chloride (K-DUR,KLOR-CON) 20 MEQ CR tablet, Take 20 mEq by mouth Daily As Needed (TAKES WHEN TAKING LASIX)., Disp: , Rfl:   •  potassium chloride (MICRO-K) 10 MEQ CR capsule, , Disp: , Rfl: 1  •  pramipexole (MIRAPEX) 1.5 MG tablet, Take 1 tablet by mouth Every Night., Disp: 30 tablet, Rfl: 2  •  promethazine (PHENERGAN) 25 MG tablet, Take 1 tablet  "by mouth Daily As Needed (migraine)., Disp: 30 tablet, Rfl: 1  •  sertraline (ZOLOFT) 100 MG tablet, Take 1.5 tablets by mouth Daily., Disp: 45 tablet, Rfl: 5  •  temazepam (RESTORIL) 30 MG capsule, Take 1 capsule by mouth At Night As Needed for Sleep., Disp: 30 capsule, Rfl: 2  •  triamterene-hydrochlorothiazide (MAXZIDE-25) 37.5-25 MG per tablet, Take 1 tablet by mouth Daily., Disp: 30 tablet, Rfl: 5  •  zonisamide (ZONEGRAN) 100 MG capsule, Take one tablet at bed for one week, then increase to two tablets at bed, Disp: 60 capsule, Rfl: 3    Objective   Resp 18   Ht 165.1 cm (65\")   Wt 109 kg (241 lb)   BMI 40.10 kg/m²    Physical Exam  Constitutional: Patient is oriented to person, place, and time. Patient appears well-developed and well-nourished.   HENT:Head: Normocephalic and atraumatic.   Eyes: EOM are normal. Pupils are equal, round, and reactive to light.   Neck: Normal range of motion. Neck supple.   Cardiovascular: Normal rate.    Pulmonary/Chest: Effort normal and breath sounds normal.   Abdominal: Soft.   Neurological: Patient is alert and oriented to person, place, and time.   Skin: Skin is warm and dry.   Psychiatric: Patient has a normal mood and affect.   Nursing note and vitals reviewed.       Ortho Exam   Right hip with minimal tenderness trochanteric area positive antalgic gait pain with internal/external rotation and reproduction of her right anterior thigh pain with his movement, positive Trendelenburg sign, negative straight leg raising on the right side.    His knee with positive patellofemoral apprehension sign positive patellofemoral crepitus, no mediolateral joint line pain no effusion full extension calf supple neurovascularly intact.    Left wrist with full painless range of motion negative basal joint grind maneuver, positive Tinel sign over the radial border of the incision distal most portion reproductive of paresthesias nondominant thumb and index finger, flexor extensor tendon " function intact to the thumb and index finger opposition palmar abduction and tip to tip pinch intact.    Assessment/Plan   Review of Radiographic Studies:    Radiographic images today of affected area I personally viewed and showed no sign of acute fracture or dislocation.      Large Joint Arthrocentesis  Date/Time: 6/26/2018 1:31 PM  Consent given by: patient  Site marked: site marked  Timeout: Immediately prior to procedure a time out was called to verify the correct patient, procedure, equipment, support staff and site/side marked as required   Supporting Documentation  Indications: pain   Procedure Details  Location: knee - L knee  Preparation: Patient was prepped and draped in the usual sterile fashion  Needle size: 22 G  Medications administered: 2 mL lidocaine 1 %; 40 mg triamcinolone acetonide 40 MG/ML  Patient tolerance: patient tolerated the procedure well with no immediate complications           April was seen today for follow-up, numbness and pain.    Diagnoses and all orders for this visit:    Arthralgia of left knee  -     XR Knee 1 or 2 View Left    Carpal tunnel syndrome, bilateral    Hip pain, chronic, right       Orthopedic activities reviewed and patient expressed appreciation      Recommendations/Plan:   Test/Studies: MRI right hip to rule out labral tear    Patient agreeable to call or return sooner for any concerns.             Impression:   Status post left hand carpal tunnel release with residual paresthesias thumb and index finger, right anterior groin pain rule out labral tear right hip, left anterior knee pain, chondro-malacia patella  Plan:  MRI right hip, and continue  therapy at home for her knee, if left knee does not improve after today's injection consider ordering MRI left knee next visit, she has seen Dr. Anderson in rheumatology in the past  she may follow up with him if multi-focal symptoms conitinue

## 2018-06-27 ENCOUNTER — PATIENT MESSAGE (OUTPATIENT)
Dept: INTERNAL MEDICINE | Facility: CLINIC | Age: 43
End: 2018-06-27

## 2018-06-27 DIAGNOSIS — M22.2X2 PATELLOFEMORAL STRESS SYNDROME OF LEFT KNEE: ICD-10-CM

## 2018-06-27 DIAGNOSIS — G43.119 INTRACTABLE MIGRAINE WITH AURA WITHOUT STATUS MIGRAINOSUS: ICD-10-CM

## 2018-06-27 DIAGNOSIS — G25.81 RESTLESS LEG SYNDROME: ICD-10-CM

## 2018-06-27 DIAGNOSIS — M62.830 MUSCLE SPASM OF BACK: ICD-10-CM

## 2018-06-27 DIAGNOSIS — M53.3 SACROILIAC JOINT DYSFUNCTION OF RIGHT SIDE: ICD-10-CM

## 2018-06-27 RX ORDER — TEMAZEPAM 30 MG/1
30 CAPSULE ORAL NIGHTLY PRN
Qty: 30 CAPSULE | Refills: 2 | Status: CANCELLED | OUTPATIENT
Start: 2018-06-27

## 2018-06-27 RX ORDER — DICLOFENAC SODIUM 75 MG/1
75 TABLET, DELAYED RELEASE ORAL 2 TIMES DAILY
Qty: 60 TABLET | Refills: 3 | Status: SHIPPED | OUTPATIENT
Start: 2018-06-27 | End: 2018-06-28 | Stop reason: ALTCHOICE

## 2018-06-28 ENCOUNTER — OFFICE VISIT (OUTPATIENT)
Dept: ORTHOPEDIC SURGERY | Facility: CLINIC | Age: 43
End: 2018-06-28

## 2018-06-28 ENCOUNTER — HOSPITAL ENCOUNTER (OUTPATIENT)
Dept: MRI IMAGING | Facility: HOSPITAL | Age: 43
Discharge: HOME | End: 2018-06-28
Attending: ORTHOPAEDIC SURGERY | Admitting: ORTHOPAEDIC SURGERY

## 2018-06-28 ENCOUNTER — PATIENT MESSAGE (OUTPATIENT)
Dept: INTERNAL MEDICINE | Facility: CLINIC | Age: 43
End: 2018-06-28

## 2018-06-28 VITALS — BODY MASS INDEX: 40.15 KG/M2 | WEIGHT: 241 LBS | HEIGHT: 65 IN | RESPIRATION RATE: 12 BRPM

## 2018-06-28 DIAGNOSIS — M19.079 ARTHRITIS OF FOOT: ICD-10-CM

## 2018-06-28 DIAGNOSIS — M21.41 PES PLANUS OF BOTH FEET: ICD-10-CM

## 2018-06-28 DIAGNOSIS — M79.7 FIBROMYALGIA: Primary | ICD-10-CM

## 2018-06-28 DIAGNOSIS — M79.7 FIBROMYALGIA: ICD-10-CM

## 2018-06-28 DIAGNOSIS — M21.42 PES PLANUS OF BOTH FEET: ICD-10-CM

## 2018-06-28 PROCEDURE — 99204 OFFICE O/P NEW MOD 45 MIN: CPT | Performed by: PODIATRIST

## 2018-06-28 PROCEDURE — 73721 MRI JNT OF LWR EXTRE W/O DYE: CPT

## 2018-06-28 RX ORDER — MELOXICAM 7.5 MG/1
7.5 TABLET ORAL DAILY
Qty: 30 TABLET | Refills: 0 | Status: SHIPPED | OUTPATIENT
Start: 2018-06-28 | End: 2018-07-31 | Stop reason: SDUPTHER

## 2018-06-28 NOTE — PROGRESS NOTES
Subjective   Patient ID: Vanna Nicolas is a 43 y.o. female   Pain of the Left Foot and Pain of the Right Foot (Pt here today at the request of Dr. Ramos to evaluate bilateral foot pain and swelling x 1 year. )  She presents today with a chief complaint of bilateral foot pain.  She states they've been bothering her since last July or August.  She denies any history of lower back problems that she knows of but states she does have fibromyalgia.  She also complains of a swelling.  She does not complain of post-static dyskinesia or pain after getting up from rest.  She states the longer she is on the feet the worst thing hurt.  She says she did work full time as a  at a [employer] working 8 hours a day but now had to drop down to 4 hours a day.  She says she can barely stand on her feet for 4 hours without them causing significant pain.  She is on multiple medications including gabapentin for her fibromyalgia.  She also takes Lasix for her swelling.  She says she's utilize compression socks.  She takes Plaquenil for what I would assume is IBS.  She states her feet ache and throb and burn with time.  Getting off the feet and elevating and resting them seems to help some.  She says she had a pair of Dr. Price's inserts that she wears but they don't seem to help much either.  History of Present Illness    Pain Score: worst possible pain (when pain is the worst)  Pain Location: Foot  Pain Orientation:  (bilateral)     Pain Descriptors: Burning  Pain Frequency: Constant/continuous  Pain Onset: Ongoing  Date Pain First Started:  (July/August 2017)  Clinical Progression: Gradually worsening  Aggravating Factors: Walking        Pain Intervention(s): Elevated, Rest  Result of Injury: No       Review of Systems   Constitutional: Negative for diaphoresis, fever and unexpected weight change.   HENT: Negative for dental problem and sore throat.    Eyes: Negative for visual disturbance.   Respiratory: Negative for  shortness of breath.    Cardiovascular: Negative for chest pain.   Gastrointestinal: Negative for abdominal pain, constipation, diarrhea, nausea and vomiting.   Genitourinary: Negative for difficulty urinating and frequency.   Musculoskeletal: Positive for arthralgias, gait problem and joint swelling.   Neurological: Negative for headaches.   Hematological: Does not bruise/bleed easily.       Past Medical History:   Diagnosis Date   • Anxiety and depression    • Body piercing     EARS BILATERAL   • Bursitis    • Chest pain     WITH EXCEDRINE TOO MUCH CAFFEINE   • Diarrhea    • Fibromyalgia    • GERD (gastroesophageal reflux disease)    • Hernia, umbilical    • High cholesterol    • Hip pain, acute, right    • Hypertension    • IBS (irritable bowel syndrome)    • Knee pain, bilateral    • Malignant melanoma     stage 1, left leg   • Migraines    • OCD (obsessive compulsive disorder)    • PTSD (post-traumatic stress disorder)    • Sleep apnea     CPAP USAGE   • Wears glasses         Past Surgical History:   Procedure Laterality Date   • CARPAL TUNNEL RELEASE Right 1/10/2018    Procedure: ELECTIVE RIGHT CARPAL TUNNEL RELEASE;  Surgeon: Gus Ramos MD;  Location: [facility] OR;  Service:    • CARPAL TUNNEL RELEASE Left 4/18/2018    Procedure: ELECTIVE LEFT CARPAL TUNNEL RELEASE;  Surgeon: Gus Ramos MD;  Location: [facility];  Service: Orthopedics   • CARPAL TUNNEL RELEASE     • COLONOSCOPY     • SKIN BIOPSY      STAGE 1 LEFT THIGH   • SKIN CANCER EXCISION     • TOTAL ABDOMINAL HYSTERECTOMY WITH SALPINGO OOPHORECTOMY  2004    ? Hormonal migraines       Allergies   Allergen Reactions   • Penicillins Unknown (See Comments)     'AS A BABY ALMOST PUT ME INTO A COMA'   • Morphine And Related Nausea And Vomiting   • Tetracyclines & Related Hives         Current Outpatient Prescriptions:   •  cyclobenzaprine (FLEXERIL) 10 MG tablet, Take 1 tablet by mouth 2 (Two) Times a Day As Needed for Muscle Spasms., Disp: 30 tablet,  Rfl: 1  •  diphenoxylate-atropine (LOMOTIL) 2.5-0.025 MG per tablet, Take 2 tablets by mouth 4 (Four) Times a Day As Needed for Diarrhea., Disp: , Rfl: 1  •  divalproex (DEPAKOTE) 500 MG 24 hr tablet, Take 1 tablet by mouth Every Night., Disp: 90 tablet, Rfl: 1  •  estradiol (ESTRACE) 1 MG tablet, Take 1 tablet by mouth Daily. Take 1/2 tablet to 1 tablet by mouth daily, Disp: 30 tablet, Rfl: 5  •  furosemide (LASIX) 10 MG half tablet, Take 20 mg by mouth Daily As Needed (SWELLING)., Disp: , Rfl:   •  gabapentin (NEURONTIN) 600 MG tablet, Take 1 tablet by mouth 3 (Three) Times a Day As Needed (nerve pain, fibromyalgia)., Disp: 90 tablet, Rfl: 3  •  hydroxychloroquine (PLAQUENIL) 200 MG tablet, Take 1 tablet by mouth Daily., Disp: 30 tablet, Rfl: 2  •  potassium chloride (K-DUR,KLOR-CON) 20 MEQ CR tablet, Take 20 mEq by mouth Daily As Needed (TAKES WHEN TAKING LASIX)., Disp: , Rfl:   •  potassium chloride (MICRO-K) 10 MEQ CR capsule, , Disp: , Rfl: 1  •  pramipexole (MIRAPEX) 1.5 MG tablet, Take 1 tablet by mouth Every Night., Disp: 30 tablet, Rfl: 2  •  promethazine (PHENERGAN) 25 MG tablet, Take 1 tablet by mouth Daily As Needed (migraine)., Disp: 30 tablet, Rfl: 1  •  sertraline (ZOLOFT) 100 MG tablet, Take 1.5 tablets by mouth Daily., Disp: 45 tablet, Rfl: 5  •  temazepam (RESTORIL) 30 MG capsule, TAKE 1 CAPSULE BY MOUTH AT BEDTIME AS NEEDED for sleep, Disp: 30 capsule, Rfl: 2  •  triamterene-hydrochlorothiazide (MAXZIDE-25) 37.5-25 MG per tablet, Take 1 tablet by mouth Daily., Disp: 30 tablet, Rfl: 5  •  zonisamide (ZONEGRAN) 100 MG capsule, Take one tablet at bed for one week, then increase to two tablets at bed, Disp: 60 capsule, Rfl: 3  •  meloxicam (MOBIC) 7.5 MG tablet, Take 1 tablet by mouth Daily., Disp: 30 tablet, Rfl: 0    Family History   Problem Relation Age of Onset   • Rheum arthritis Mother    • Ulcerative colitis Mother    • COPD Father    • Crohn's disease Sister    • No Known Problems Maternal  Aunt    • No Known Problems Maternal Uncle    • No Known Problems Paternal Aunt    • No Known Problems Paternal Uncle    • Kidney disease Maternal Grandmother    • Alzheimer's disease Maternal Grandfather    • No Known Problems Paternal Grandmother    • No Known Problems Paternal Grandfather    • Breast cancer Neg Hx    • Ovarian cancer Neg Hx        Social History     Social History   • Marital status:      Spouse name: N/A   • Number of children: N/A   • Years of education: N/A     Occupational History   • Not on file.     Social History Main Topics   • Smoking status: Current Every Day Smoker     Packs/day: 1.00     Years: 9.00     Types: Cigarettes   • Smokeless tobacco: Never Used   • Alcohol use No   • Drug use: No   • Sexual activity: Defer     Other Topics Concern   • Not on file     Social History Narrative   • No narrative on file       Ready to quit: Not Answered  Counseling given: Not Answered       I have reviewed all of the above social hx, family hx, surgical hx, medications, allergies & ROS and confirm that it is accurate.  Objective   Physical Exam   Constitutional: She is oriented to person, place, and time. She appears well-developed and well-nourished.   HENT:   Head: Normocephalic and atraumatic.   Eyes: EOM are normal. Pupils are equal, round, and reactive to light.   Neck: Normal range of motion.   Cardiovascular: Normal rate.    Pulmonary/Chest: Effort normal.   Abdominal: Soft.   Musculoskeletal: Normal range of motion.   Neurological: She is alert and oriented to person, place, and time. She has normal reflexes.   Skin: Skin is warm.   Psychiatric: She has a normal mood and affect. Her behavior is normal. Judgment and thought content normal.   Nursing note and vitals reviewed.    Ortho Exam  Ortho Exambilateral  Lower extremity exam:  Vascular: Pulses palpable, pedal hair noted, CFT brisk, edema is noted to both feet but seems to be increased on the right side compared to the  left.  Neuro: Gross sensation intact.  Negative Tinel's over the posterior tibial nerve.  Derm: Normal turgor and temperature, no wounds or sores or lesions  MSK: Joint range of motion normal, manual muscle testing normal, no pain to palpation, no pain with range of motion  I'm unable to elicit areas of pain today but she states when it does hurt when she is weightbearing at some bottom of the entire foot.    Assessment/Plan fibromyalgia, pes planovalgus, bilateral foot pain, bilateral midfoot arthritis  Independent Review of Radiographic Studies:      Laboratory and Other Studies:     Medical Decision Making:        Procedures  [] No procedures were performed in office today.    April was seen today for pain and pain.    Diagnoses and all orders for this visit:    Fibromyalgia    Arthritis of foot    Pes planus of both feet    Other orders  -     meloxicam (MOBIC) 7.5 MG tablet; Take 1 tablet by mouth Daily.          Recommendations/Plan:  I discussed her x-rays with her and explained that in my opinion this is most likely fatigue and due to the flat foot deformity as well as the arthritis that stability throughout her foot.  I explained to her that the overlying fibromyalgia is also problematic and may make complete resolution of her pain and symptoms difficult.  I discussed her pain could be completely coming from the fibromyalgia but she does have clinical and radiographic signs of potential areas of concern and pain as well.  We will start simply with a improved insert.  I recommend power steps and good supportive shoe gear.  I recommend against barefooted and flip-flops.  I recommend she rest and elevate and ice the feet as much as she can.  For the swelling I explained this could be medication related medicines such as gabapentin or other medications.  I discussed with her this could be related to diet and salt intake as well as other health issues that we may need to discuss with her primary care physician.   She states she had an ultrasound performed of her veins in the past but has not had a thorough vascular evaluation.  I discussed referral to Dr. Santiago or a pain specialist may be needed as well.  In the meantime I recommend she wear knee-high compression socks especially at work.  We will stop the diclofenac and try meloxicam once daily for her.  I'm hoping this will maybe decrease potential symptoms of her IBS as well as heartburn.  She will continue to take the gabapentin and I explained we can increase that dose if needed.  She will be prescribed a topical compounded pain cream as well.  I would like for her to try these modalities and then follow-up in 3-4 weeks.  I discussed break-in period with the power steps with her and did advise her that due to the deformity and severe flat foot she may be better suited with custom orthotics.  Follow up in 3-4 weeks.    Return in about 4 weeks (around 7/26/2018).  Patient agreeable to call or return sooner for any concerns.

## 2018-06-30 DIAGNOSIS — M79.7 FIBROMYALGIA: ICD-10-CM

## 2018-06-30 RX ORDER — GABAPENTIN 600 MG/1
TABLET ORAL
Qty: 90 TABLET | Refills: 0 | Status: CANCELLED | OUTPATIENT
Start: 2018-06-30

## 2018-07-02 RX ORDER — GABAPENTIN 600 MG/1
600 TABLET ORAL 3 TIMES DAILY PRN
Qty: 90 TABLET | Refills: 3 | Status: SHIPPED | OUTPATIENT
Start: 2018-07-02 | End: 2020-09-16

## 2018-07-02 NOTE — TELEPHONE ENCOUNTER
Deirdre Sanchez MA 6/29/2018 6:00 PM EDT        ----- Message -----  From: Cortney Gastelum MA  Sent: 6/29/2018 9:41 AM  To: Deirdre Sanchez MA  Subject: FW: Prescription Question         ----- Message -----  From: April Judy Maria Isabel  Sent: 6/28/2018 6:37 PM  To: Fang Swanson Mr Clinical Pool  Subject: Prescription Question      [facility] pharmacy didn't get the refill request for Gabapentin. Could you send that in again? I am completely out.

## 2018-07-31 ENCOUNTER — OFFICE VISIT (OUTPATIENT)
Dept: ORTHOPEDIC SURGERY | Facility: CLINIC | Age: 43
End: 2018-07-31

## 2018-07-31 VITALS — BODY MASS INDEX: 38.32 KG/M2 | WEIGHT: 230 LBS | HEIGHT: 65 IN | RESPIRATION RATE: 18 BRPM

## 2018-07-31 DIAGNOSIS — M79.672 BILATERAL FOOT PAIN: Primary | ICD-10-CM

## 2018-07-31 DIAGNOSIS — M21.41 PES PLANUS OF BOTH FEET: ICD-10-CM

## 2018-07-31 DIAGNOSIS — M19.079 ARTHRITIS OF FOOT: ICD-10-CM

## 2018-07-31 DIAGNOSIS — M79.7 FIBROMYALGIA: ICD-10-CM

## 2018-07-31 DIAGNOSIS — M79.671 BILATERAL FOOT PAIN: Primary | ICD-10-CM

## 2018-07-31 DIAGNOSIS — M21.42 PES PLANUS OF BOTH FEET: ICD-10-CM

## 2018-07-31 DIAGNOSIS — M72.2 PLANTAR FASCIITIS: ICD-10-CM

## 2018-07-31 PROCEDURE — 99213 OFFICE O/P EST LOW 20 MIN: CPT | Performed by: PODIATRIST

## 2018-07-31 PROCEDURE — 20550 NJX 1 TENDON SHEATH/LIGAMENT: CPT | Performed by: PODIATRIST

## 2018-07-31 RX ORDER — DEXAMETHASONE SODIUM PHOSPHATE 10 MG/ML
5 INJECTION INTRAMUSCULAR; INTRAVENOUS
Status: COMPLETED | OUTPATIENT
Start: 2018-07-31 | End: 2018-07-31

## 2018-07-31 RX ORDER — MELOXICAM 7.5 MG/1
7.5 TABLET ORAL DAILY
Qty: 30 TABLET | Refills: 2 | Status: SHIPPED | OUTPATIENT
Start: 2018-07-31 | End: 2019-01-07 | Stop reason: SDUPTHER

## 2018-07-31 RX ORDER — TRIAMCINOLONE ACETONIDE 40 MG/ML
40 INJECTION, SUSPENSION INTRA-ARTICULAR; INTRAMUSCULAR
Status: COMPLETED | OUTPATIENT
Start: 2018-07-31 | End: 2018-07-31

## 2018-07-31 RX ORDER — LIDOCAINE HYDROCHLORIDE 10 MG/ML
5 INJECTION, SOLUTION INFILTRATION; PERINEURAL
Status: COMPLETED | OUTPATIENT
Start: 2018-07-31 | End: 2018-07-31

## 2018-07-31 RX ORDER — BUPIVACAINE HYDROCHLORIDE 5 MG/ML
2.5 INJECTION, SOLUTION EPIDURAL; INTRACAUDAL
Status: COMPLETED | OUTPATIENT
Start: 2018-07-31 | End: 2018-07-31

## 2018-07-31 RX ADMIN — BUPIVACAINE HYDROCHLORIDE 2.5 MG: 5 INJECTION, SOLUTION EPIDURAL; INTRACAUDAL at 15:11

## 2018-07-31 RX ADMIN — TRIAMCINOLONE ACETONIDE 40 MG: 40 INJECTION, SUSPENSION INTRA-ARTICULAR; INTRAMUSCULAR at 15:11

## 2018-07-31 RX ADMIN — DEXAMETHASONE SODIUM PHOSPHATE 5 MG: 10 INJECTION INTRAMUSCULAR; INTRAVENOUS at 15:11

## 2018-07-31 RX ADMIN — LIDOCAINE HYDROCHLORIDE 5 MG: 10 INJECTION, SOLUTION INFILTRATION; PERINEURAL at 15:11

## 2018-07-31 NOTE — PROGRESS NOTES
Subjective   Patient ID: Vanna Nicolas is a 43 y.o. female   Follow-up, Pain, and Edema of the Left Foot and Follow-up, Pain, and Edema of the Right Foot  She presents back today for her bilateral foot pain.  She states she think she has a little bit better.  She is wearing good shoes.  She purchased the power step inserts.  She is also wearing compression socks.  States she's taking the medication and she thinks it's helping slightly as well.    History of Present Illness    Pain Score: 5  Pain Location: Foot  Pain Orientation: Right, Left     Pain Descriptors: Burning, Sharp  Pain Frequency: Intermittent           Aggravating Factors: Walking, Standing        Pain Intervention(s): Medication (See MAR)  Result of Injury: No  Work-Related Injury: No    Review of Systems   Skin: Negative for color change and wound.       Past Medical History:   Diagnosis Date   • Anxiety and depression    • Body piercing     EARS BILATERAL   • Bursitis    • Chest pain     WITH EXCEDRINE TOO MUCH CAFFEINE   • Diarrhea    • Fibromyalgia    • GERD (gastroesophageal reflux disease)    • Hernia, umbilical    • High cholesterol    • Hip pain, acute, right    • Hypertension    • IBS (irritable bowel syndrome)    • Knee pain, bilateral    • Malignant melanoma (CMS/HCC)     stage 1, left leg   • Migraines    • OCD (obsessive compulsive disorder)    • PTSD (post-traumatic stress disorder)    • Sleep apnea     CPAP USAGE   • Wears glasses         Past Surgical History:   Procedure Laterality Date   • CARPAL TUNNEL RELEASE Right 1/10/2018    Procedure: ELECTIVE RIGHT CARPAL TUNNEL RELEASE;  Surgeon: Gus Ramos MD;  Location: [facility];  Service:    • CARPAL TUNNEL RELEASE Left 4/18/2018    Procedure: ELECTIVE LEFT CARPAL TUNNEL RELEASE;  Surgeon: Gus Ramos MD;  Location: [facility];  Service: Orthopedics   • CARPAL TUNNEL RELEASE     • COLONOSCOPY     • SKIN BIOPSY      STAGE 1 LEFT THIGH   • SKIN CANCER EXCISION     • TOTAL  ABDOMINAL HYSTERECTOMY WITH SALPINGO OOPHORECTOMY  2004    ? Hormonal migraines       Allergies   Allergen Reactions   • Penicillins Unknown (See Comments)     'AS A BABY ALMOST PUT ME INTO A COMA'   • Morphine And Related Nausea And Vomiting   • Tetracyclines & Related Hives         Current Outpatient Prescriptions:   •  cyclobenzaprine (FLEXERIL) 10 MG tablet, Take 1 tablet by mouth 2 (Two) Times a Day As Needed for Muscle Spasms., Disp: 30 tablet, Rfl: 1  •  diphenoxylate-atropine (LOMOTIL) 2.5-0.025 MG per tablet, Take 2 tablets by mouth 4 (Four) Times a Day As Needed for Diarrhea., Disp: , Rfl: 1  •  divalproex (DEPAKOTE) 500 MG 24 hr tablet, Take 1 tablet by mouth Every Night., Disp: 90 tablet, Rfl: 1  •  estradiol (ESTRACE) 1 MG tablet, Take 1 tablet by mouth Daily. Take 1/2 tablet to 1 tablet by mouth daily, Disp: 30 tablet, Rfl: 5  •  furosemide (LASIX) 10 MG half tablet, Take 20 mg by mouth Daily As Needed (SWELLING)., Disp: , Rfl:   •  gabapentin (NEURONTIN) 600 MG tablet, Take 1 tablet by mouth 3 (Three) Times a Day As Needed (nerve pain, fibromyalgia)., Disp: 90 tablet, Rfl: 3  •  hydroxychloroquine (PLAQUENIL) 200 MG tablet, Take 1 tablet by mouth Daily., Disp: 30 tablet, Rfl: 2  •  meloxicam (MOBIC) 7.5 MG tablet, Take 1 tablet by mouth Daily., Disp: 30 tablet, Rfl: 2  •  potassium chloride (K-DUR,KLOR-CON) 20 MEQ CR tablet, Take 20 mEq by mouth Daily As Needed (TAKES WHEN TAKING LASIX)., Disp: , Rfl:   •  potassium chloride (MICRO-K) 10 MEQ CR capsule, , Disp: , Rfl: 1  •  pramipexole (MIRAPEX) 1.5 MG tablet, Take 1 tablet by mouth Every Night., Disp: 30 tablet, Rfl: 2  •  promethazine (PHENERGAN) 25 MG tablet, Take 1 tablet by mouth Daily As Needed (migraine)., Disp: 30 tablet, Rfl: 1  •  sertraline (ZOLOFT) 100 MG tablet, Take 1.5 tablets by mouth Daily., Disp: 45 tablet, Rfl: 5  •  temazepam (RESTORIL) 30 MG capsule, TAKE 1 CAPSULE BY MOUTH AT BEDTIME AS NEEDED for sleep, Disp: 30 capsule, Rfl:  2  •  triamterene-hydrochlorothiazide (MAXZIDE-25) 37.5-25 MG per tablet, Take 1 tablet by mouth Daily., Disp: 30 tablet, Rfl: 5  •  zonisamide (ZONEGRAN) 100 MG capsule, Take one tablet at bed for one week, then increase to two tablets at bed, Disp: 60 capsule, Rfl: 3    Family History   Problem Relation Age of Onset   • Rheum arthritis Mother    • Ulcerative colitis Mother    • COPD Father    • Crohn's disease Sister    • No Known Problems Maternal Aunt    • No Known Problems Maternal Uncle    • No Known Problems Paternal Aunt    • No Known Problems Paternal Uncle    • Kidney disease Maternal Grandmother    • Alzheimer's disease Maternal Grandfather    • No Known Problems Paternal Grandmother    • No Known Problems Paternal Grandfather    • Breast cancer Neg Hx    • Ovarian cancer Neg Hx        Social History     Social History   • Marital status:      Spouse name: N/A   • Number of children: N/A   • Years of education: N/A     Occupational History   • Not on file.     Social History Main Topics   • Smoking status: Current Every Day Smoker     Packs/day: 1.00     Years: 9.00     Types: Cigarettes   • Smokeless tobacco: Never Used   • Alcohol use No   • Drug use: No   • Sexual activity: Defer     Other Topics Concern   • Not on file     Social History Narrative   • No narrative on file       Ready to quit: Not Answered  Counseling given: Not Answered       I have reviewed all of the above social hx, family hx, surgical hx, medications, allergies & ROS and confirm that it is accurate.  Objective   Physical Exam   Constitutional: She is oriented to person, place, and time. She appears well-developed and well-nourished.   HENT:   Head: Normocephalic and atraumatic.   Eyes: Pupils are equal, round, and reactive to light. EOM are normal.   Neck: Normal range of motion.   Pulmonary/Chest: Effort normal.   Musculoskeletal: Normal range of motion.   Neurological: She is alert and oriented to person, place, and time.  "She has normal reflexes.   Skin: Skin is warm.   Psychiatric: She has a normal mood and affect. Her behavior is normal. Judgment and thought content normal.   Nursing note and vitals reviewed.    Ortho Exam  Ortho Exam    Ortho Exambilateral  Lower extremity exam:  Vascular: Pulses palpable, pedal hair noted, CFT brisk, edema is noted to both feet but seems to be increased on the right side compared to the left.  Neuro: Gross sensation intact.  Negative Tinel's over the posterior tibial nerve.  Derm: Normal turgor and temperature, no wounds or sores or lesions  MSK: Joint range of motion normal, manual muscle testing normal, no pain to palpation, no pain with range of motion  I'm unable to elicit areas of pain today but she states when it does hurt when she is weightbearing at some bottom of the entire foot.  Today she is tender to palpation directly in the central aspect of the right heel  Assessment/Plan right plantar fasciitis, bilateral midfoot arthritis, pes planovalgus, posterior tibial tendon dysfunction, fibromyalgia, bilateral lower extremity edema  Independent Review of Radiographic Studies:      Laboratory and Other Studies:     Medical Decision Making:        Injection Tendon or Ligament  Date/Time: 7/31/2018 3:11 PM  Performed by: SANIYA LOZADA  Authorized by: SANIYA LOZADA   Consent: Verbal consent obtained.  Risks and benefits: risks, benefits and alternatives were discussed  Consent given by: patient  Required items: required blood products, implants, devices, and special equipment available  Patient identity confirmed: verbally with patient  Time out: Immediately prior to procedure a \"time out\" was called to verify the correct patient, procedure, equipment, support staff and site/side marked as required.  Preparation: Patient was prepped and draped in the usual sterile fashion.  Patient tolerance: Patient tolerated the procedure well with no immediate complications  Comments: Right plantar " fascial injection  Medications administered: 2.5 mg bupivacaine (PF) 0.5 %; 5 mg lidocaine 1 %; 40 mg triamcinolone acetonide 40 MG/ML; 5 mg dexamethasone 10 MG/ML        [] No procedures were performed in office today.    April was seen today for follow-up, pain, edema, follow-up, pain and edema.    Diagnoses and all orders for this visit:    Bilateral foot pain    Pes planus of both feet    Arthritis of foot    Fibromyalgia    Plantar fasciitis    Other orders  -     meloxicam (MOBIC) 7.5 MG tablet; Take 1 tablet by mouth Daily.  -     Injection Tendon or Ligament          Recommendations/Plan:  I recommend she continue with all her other treatment options and suggestions.  I will see how this injection does her.  I explained that this will help us to delineate and determine if this is truly plantar fascial pain versus overlying arthritis and/or nerve pain from the fibromyalgia.  She is to continue with stretching, good supportive shoes and inserts.  Continue with icing.  She is wearing compression socks.  He may consider sending her for [place] sock and custom orthotics if needed.  Follow-up in 3 weeks.    Return in about 3 weeks (around 8/21/2018).  Patient agreeable to call or return sooner for any concerns.

## 2018-08-02 ENCOUNTER — TELEPHONE (OUTPATIENT)
Dept: INTERNAL MEDICINE | Facility: CLINIC | Age: 43
End: 2018-08-02

## 2018-08-02 DIAGNOSIS — G25.81 RESTLESS LEG SYNDROME: ICD-10-CM

## 2018-08-02 RX ORDER — PRAMIPEXOLE DIHYDROCHLORIDE 1.5 MG/1
1.5 TABLET ORAL NIGHTLY
Qty: 30 TABLET | Refills: 2 | Status: SHIPPED | OUTPATIENT
Start: 2018-08-02 | End: 2020-09-16

## 2018-08-02 NOTE — TELEPHONE ENCOUNTER
----- Message from Vanna Nicolas sent at 8/2/2018  1:32 AM EDT -----  Regarding: Prescription Question  Contact: [phone]  I need a refill called in for Mirapex 1.5 mg.

## 2018-08-27 ENCOUNTER — APPOINTMENT (OUTPATIENT)
Dept: CT IMAGING | Facility: HOSPITAL | Age: 43
End: 2018-08-27

## 2018-08-27 ENCOUNTER — HOSPITAL ENCOUNTER (EMERGENCY)
Facility: HOSPITAL | Age: 43
Discharge: HOME | End: 2018-08-27
Attending: STUDENT IN AN ORGANIZED HEALTH CARE EDUCATION/TRAINING PROGRAM | Admitting: STUDENT IN AN ORGANIZED HEALTH CARE EDUCATION/TRAINING PROGRAM

## 2018-08-27 VITALS
RESPIRATION RATE: 18 BRPM | SYSTOLIC BLOOD PRESSURE: 150 MMHG | HEIGHT: 65 IN | HEART RATE: 73 BPM | WEIGHT: 240.6 LBS | OXYGEN SATURATION: 99 % | TEMPERATURE: 97.9 F | DIASTOLIC BLOOD PRESSURE: 94 MMHG | BODY MASS INDEX: 40.08 KG/M2

## 2018-08-27 DIAGNOSIS — R19.5 OCCULT BLOOD IN STOOLS: Primary | ICD-10-CM

## 2018-08-27 LAB
ALBUMIN SERPL-MCNC: 4.1 G/DL (ref 3.5–5)
ALBUMIN/GLOB SERPL: 1.6 G/DL (ref 1–2)
ALP SERPL-CCNC: 113 U/L (ref 38–126)
ALT SERPL W P-5'-P-CCNC: 40 U/L (ref 13–69)
ANION GAP SERPL CALCULATED.3IONS-SCNC: 11.2 MMOL/L (ref 10–20)
AST SERPL-CCNC: 29 U/L (ref 15–46)
B-HCG UR QL: NEGATIVE
BASOPHILS # BLD AUTO: 0.08 10*3/MM3 (ref 0–0.2)
BASOPHILS NFR BLD AUTO: 0.7 % (ref 0–2.5)
BILIRUB SERPL-MCNC: 0.3 MG/DL (ref 0.2–1.3)
BILIRUB UR QL STRIP: NEGATIVE
BUN BLD-MCNC: 13 MG/DL (ref 7–20)
BUN/CREAT SERPL: 21.7 (ref 7.1–23.5)
CALCIUM SPEC-SCNC: 9.3 MG/DL (ref 8.4–10.2)
CHLORIDE SERPL-SCNC: 107 MMOL/L (ref 98–107)
CLARITY UR: ABNORMAL
CO2 SERPL-SCNC: 26 MMOL/L (ref 26–30)
COLOR UR: YELLOW
CREAT BLD-MCNC: 0.6 MG/DL (ref 0.6–1.3)
DEPRECATED RDW RBC AUTO: 42.6 FL (ref 37–54)
EOSINOPHIL # BLD AUTO: 0.37 10*3/MM3 (ref 0–0.7)
EOSINOPHIL NFR BLD AUTO: 3.4 % (ref 0–7)
ERYTHROCYTE [DISTWIDTH] IN BLOOD BY AUTOMATED COUNT: 12.6 % (ref 11.5–14.5)
GFR SERPL CREATININE-BSD FRML MDRD: 110 ML/MIN/1.73
GLOBULIN UR ELPH-MCNC: 2.6 GM/DL
GLUCOSE BLD-MCNC: 94 MG/DL (ref 74–98)
GLUCOSE UR STRIP-MCNC: NEGATIVE MG/DL
HCT VFR BLD AUTO: 44.7 % (ref 37–47)
HEMOCCULT STL QL: POSITIVE
HGB BLD-MCNC: 15.1 G/DL (ref 12–16)
HGB UR QL STRIP.AUTO: NEGATIVE
IMM GRANULOCYTES # BLD: 0.05 10*3/MM3 (ref 0–0.06)
IMM GRANULOCYTES NFR BLD: 0.5 % (ref 0–0.6)
KETONES UR QL STRIP: ABNORMAL
LEUKOCYTE ESTERASE UR QL STRIP.AUTO: NEGATIVE
LIPASE SERPL-CCNC: 89 U/L (ref 23–300)
LYMPHOCYTES # BLD AUTO: 1.88 10*3/MM3 (ref 0.6–3.4)
LYMPHOCYTES NFR BLD AUTO: 17.3 % (ref 10–50)
MCH RBC QN AUTO: 31.1 PG (ref 27–31)
MCHC RBC AUTO-ENTMCNC: 33.8 G/DL (ref 30–37)
MCV RBC AUTO: 92.2 FL (ref 81–99)
MONOCYTES # BLD AUTO: 0.69 10*3/MM3 (ref 0–0.9)
MONOCYTES NFR BLD AUTO: 6.3 % (ref 0–12)
NEUTROPHILS # BLD AUTO: 7.81 10*3/MM3 (ref 2–6.9)
NEUTROPHILS NFR BLD AUTO: 71.8 % (ref 37–80)
NITRITE UR QL STRIP: NEGATIVE
NRBC BLD MANUAL-RTO: 0 /100 WBC (ref 0–0)
PH UR STRIP.AUTO: 6 [PH] (ref 5–8)
PLATELET # BLD AUTO: 190 10*3/MM3 (ref 130–400)
PMV BLD AUTO: 10.2 FL (ref 6–12)
POTASSIUM BLD-SCNC: 4.2 MMOL/L (ref 3.5–5.1)
PROT SERPL-MCNC: 6.7 G/DL (ref 6.3–8.2)
PROT UR QL STRIP: NEGATIVE
RBC # BLD AUTO: 4.85 10*6/MM3 (ref 4.2–5.4)
SODIUM BLD-SCNC: 140 MMOL/L (ref 137–145)
SP GR UR STRIP: >=1.03 (ref 1–1.03)
UROBILINOGEN UR QL STRIP: ABNORMAL
VALPROATE SERPL-MCNC: 18.6 MCG/ML (ref 50–100)
WBC NRBC COR # BLD: 10.88 10*3/MM3 (ref 4.8–10.8)

## 2018-08-27 PROCEDURE — 74177 CT ABD & PELVIS W/CONTRAST: CPT

## 2018-08-27 PROCEDURE — 80164 ASSAY DIPROPYLACETIC ACD TOT: CPT | Performed by: NURSE PRACTITIONER

## 2018-08-27 PROCEDURE — 25010000002 IOPAMIDOL 61 % SOLUTION: Performed by: STUDENT IN AN ORGANIZED HEALTH CARE EDUCATION/TRAINING PROGRAM

## 2018-08-27 PROCEDURE — 82272 OCCULT BLD FECES 1-3 TESTS: CPT | Performed by: NURSE PRACTITIONER

## 2018-08-27 PROCEDURE — 80053 COMPREHEN METABOLIC PANEL: CPT | Performed by: NURSE PRACTITIONER

## 2018-08-27 PROCEDURE — 96374 THER/PROPH/DIAG INJ IV PUSH: CPT

## 2018-08-27 PROCEDURE — 81003 URINALYSIS AUTO W/O SCOPE: CPT | Performed by: NURSE PRACTITIONER

## 2018-08-27 PROCEDURE — 85025 COMPLETE CBC W/AUTO DIFF WBC: CPT | Performed by: NURSE PRACTITIONER

## 2018-08-27 PROCEDURE — 99283 EMERGENCY DEPT VISIT LOW MDM: CPT

## 2018-08-27 PROCEDURE — 81025 URINE PREGNANCY TEST: CPT | Performed by: NURSE PRACTITIONER

## 2018-08-27 PROCEDURE — 83690 ASSAY OF LIPASE: CPT | Performed by: NURSE PRACTITIONER

## 2018-08-27 PROCEDURE — 25010000002 ONDANSETRON PER 1 MG: Performed by: NURSE PRACTITIONER

## 2018-08-27 RX ORDER — ONDANSETRON 4 MG/1
4 TABLET, ORALLY DISINTEGRATING ORAL EVERY 6 HOURS PRN
Qty: 20 TABLET | Refills: 0 | Status: SHIPPED | OUTPATIENT
Start: 2018-08-27 | End: 2020-09-16

## 2018-08-27 RX ORDER — SODIUM CHLORIDE 0.9 % (FLUSH) 0.9 %
10 SYRINGE (ML) INJECTION AS NEEDED
Status: DISCONTINUED | OUTPATIENT
Start: 2018-08-27 | End: 2018-08-27 | Stop reason: HOSPADM

## 2018-08-27 RX ORDER — ONDANSETRON 2 MG/ML
4 INJECTION INTRAMUSCULAR; INTRAVENOUS ONCE
Status: COMPLETED | OUTPATIENT
Start: 2018-08-27 | End: 2018-08-27

## 2018-08-27 RX ADMIN — SODIUM CHLORIDE 1000 ML: 9 INJECTION, SOLUTION INTRAVENOUS at 16:27

## 2018-08-27 RX ADMIN — IOPAMIDOL 100 ML: 612 INJECTION, SOLUTION INTRAVENOUS at 17:21

## 2018-08-27 RX ADMIN — ONDANSETRON 4 MG: 2 INJECTION, SOLUTION INTRAMUSCULAR; INTRAVENOUS at 16:28

## 2018-09-03 ENCOUNTER — HOSPITAL ENCOUNTER (EMERGENCY)
Facility: HOSPITAL | Age: 43
Discharge: HOME | End: 2018-09-03
Attending: EMERGENCY MEDICINE | Admitting: EMERGENCY MEDICINE

## 2018-09-03 VITALS
DIASTOLIC BLOOD PRESSURE: 88 MMHG | RESPIRATION RATE: 18 BRPM | SYSTOLIC BLOOD PRESSURE: 127 MMHG | BODY MASS INDEX: 39.35 KG/M2 | HEART RATE: 68 BPM | OXYGEN SATURATION: 99 % | TEMPERATURE: 98.2 F | WEIGHT: 236.2 LBS | HEIGHT: 65 IN

## 2018-09-03 DIAGNOSIS — R10.9 ABDOMINAL CRAMPING: ICD-10-CM

## 2018-09-03 DIAGNOSIS — Z87.19 HISTORY OF IBS: ICD-10-CM

## 2018-09-03 DIAGNOSIS — R19.7 BLOODY DIARRHEA: Primary | ICD-10-CM

## 2018-09-03 LAB
ALBUMIN SERPL-MCNC: 4.2 G/DL (ref 3.5–5)
ALBUMIN/GLOB SERPL: 1.4 G/DL (ref 1–2)
ALP SERPL-CCNC: 108 U/L (ref 38–126)
ALT SERPL W P-5'-P-CCNC: 35 U/L (ref 13–69)
ANION GAP SERPL CALCULATED.3IONS-SCNC: 12.1 MMOL/L (ref 10–20)
AST SERPL-CCNC: 29 U/L (ref 15–46)
BASOPHILS # BLD AUTO: 0.06 10*3/MM3 (ref 0–0.2)
BASOPHILS NFR BLD AUTO: 0.8 % (ref 0–2.5)
BILIRUB SERPL-MCNC: 0.4 MG/DL (ref 0.2–1.3)
BUN BLD-MCNC: 10 MG/DL (ref 7–20)
BUN/CREAT SERPL: 14.3 (ref 7.1–23.5)
CALCIUM SPEC-SCNC: 9.4 MG/DL (ref 8.4–10.2)
CHLORIDE SERPL-SCNC: 106 MMOL/L (ref 98–107)
CO2 SERPL-SCNC: 27 MMOL/L (ref 26–30)
CREAT BLD-MCNC: 0.7 MG/DL (ref 0.6–1.3)
DEPRECATED RDW RBC AUTO: 42.2 FL (ref 37–54)
EOSINOPHIL # BLD AUTO: 0.33 10*3/MM3 (ref 0–0.7)
EOSINOPHIL NFR BLD AUTO: 4.3 % (ref 0–7)
ERYTHROCYTE [DISTWIDTH] IN BLOOD BY AUTOMATED COUNT: 12.7 % (ref 11.5–14.5)
GFR SERPL CREATININE-BSD FRML MDRD: 92 ML/MIN/1.73
GLOBULIN UR ELPH-MCNC: 2.9 GM/DL
GLUCOSE BLD-MCNC: 115 MG/DL (ref 74–98)
HCT VFR BLD AUTO: 46.3 % (ref 37–47)
HEMOCCULT STL QL: POSITIVE
HGB BLD-MCNC: 15.8 G/DL (ref 12–16)
HOLD SPECIMEN: NORMAL
HOLD SPECIMEN: NORMAL
IMM GRANULOCYTES # BLD: 0.08 10*3/MM3 (ref 0–0.06)
IMM GRANULOCYTES NFR BLD: 1 % (ref 0–0.6)
LIPASE SERPL-CCNC: 73 U/L (ref 23–300)
LYMPHOCYTES # BLD AUTO: 1.31 10*3/MM3 (ref 0.6–3.4)
LYMPHOCYTES NFR BLD AUTO: 17.1 % (ref 10–50)
MCH RBC QN AUTO: 31.2 PG (ref 27–31)
MCHC RBC AUTO-ENTMCNC: 34.1 G/DL (ref 30–37)
MCV RBC AUTO: 91.3 FL (ref 81–99)
MONOCYTES # BLD AUTO: 0.62 10*3/MM3 (ref 0–0.9)
MONOCYTES NFR BLD AUTO: 8.1 % (ref 0–12)
NEUTROPHILS # BLD AUTO: 5.25 10*3/MM3 (ref 2–6.9)
NEUTROPHILS NFR BLD AUTO: 68.7 % (ref 37–80)
NRBC BLD MANUAL-RTO: 0 /100 WBC (ref 0–0)
PLATELET # BLD AUTO: 229 10*3/MM3 (ref 130–400)
PMV BLD AUTO: 10 FL (ref 6–12)
POTASSIUM BLD-SCNC: 4.1 MMOL/L (ref 3.5–5.1)
PROT SERPL-MCNC: 7.1 G/DL (ref 6.3–8.2)
RBC # BLD AUTO: 5.07 10*6/MM3 (ref 4.2–5.4)
SODIUM BLD-SCNC: 141 MMOL/L (ref 137–145)
WBC NRBC COR # BLD: 7.65 10*3/MM3 (ref 4.8–10.8)
WHOLE BLOOD HOLD SPECIMEN: NORMAL
WHOLE BLOOD HOLD SPECIMEN: NORMAL

## 2018-09-03 PROCEDURE — 80053 COMPREHEN METABOLIC PANEL: CPT | Performed by: PHYSICIAN ASSISTANT

## 2018-09-03 PROCEDURE — 82272 OCCULT BLD FECES 1-3 TESTS: CPT | Performed by: PHYSICIAN ASSISTANT

## 2018-09-03 PROCEDURE — 83690 ASSAY OF LIPASE: CPT | Performed by: PHYSICIAN ASSISTANT

## 2018-09-03 PROCEDURE — 99283 EMERGENCY DEPT VISIT LOW MDM: CPT

## 2018-09-03 PROCEDURE — 85025 COMPLETE CBC W/AUTO DIFF WBC: CPT | Performed by: PHYSICIAN ASSISTANT

## 2018-09-03 PROCEDURE — 96360 HYDRATION IV INFUSION INIT: CPT

## 2018-09-03 RX ORDER — DICYCLOMINE HYDROCHLORIDE 10 MG/1
10 CAPSULE ORAL ONCE
Status: COMPLETED | OUTPATIENT
Start: 2018-09-03 | End: 2018-09-03

## 2018-09-03 RX ORDER — METRONIDAZOLE 500 MG/1
500 TABLET ORAL ONCE
Status: COMPLETED | OUTPATIENT
Start: 2018-09-03 | End: 2018-09-03

## 2018-09-03 RX ORDER — SODIUM CHLORIDE 0.9 % (FLUSH) 0.9 %
10 SYRINGE (ML) INJECTION AS NEEDED
Status: DISCONTINUED | OUTPATIENT
Start: 2018-09-03 | End: 2018-09-03 | Stop reason: HOSPADM

## 2018-09-03 RX ORDER — DICYCLOMINE HYDROCHLORIDE 10 MG/1
10 CAPSULE ORAL
Qty: 12 CAPSULE | Refills: 0 | Status: SHIPPED | OUTPATIENT
Start: 2018-09-03 | End: 2018-09-06

## 2018-09-03 RX ORDER — CIPROFLOXACIN 500 MG/1
500 TABLET, FILM COATED ORAL ONCE
Status: COMPLETED | OUTPATIENT
Start: 2018-09-03 | End: 2018-09-03

## 2018-09-03 RX ORDER — CIPROFLOXACIN 500 MG/1
500 TABLET, FILM COATED ORAL EVERY 12 HOURS
Qty: 14 TABLET | Refills: 0 | Status: SHIPPED | OUTPATIENT
Start: 2018-09-03 | End: 2018-09-10

## 2018-09-03 RX ORDER — METRONIDAZOLE 500 MG/1
500 TABLET ORAL 3 TIMES DAILY
Qty: 21 TABLET | Refills: 0 | Status: SHIPPED | OUTPATIENT
Start: 2018-09-03 | End: 2018-09-10

## 2018-09-03 RX ADMIN — CIPROFLOXACIN 500 MG: 500 TABLET, FILM COATED ORAL at 15:21

## 2018-09-03 RX ADMIN — METRONIDAZOLE 500 MG: 500 TABLET ORAL at 15:21

## 2018-09-03 RX ADMIN — SODIUM CHLORIDE 1000 ML: 9 INJECTION, SOLUTION INTRAVENOUS at 14:42

## 2018-09-03 RX ADMIN — DICYCLOMINE HYDROCHLORIDE 10 MG: 10 CAPSULE ORAL at 15:21

## 2018-09-03 NOTE — ED PROVIDER NOTES
Subjective   43-year-old female with diarrhea and bloody diarrhea.  She said the symptoms intermittently for one week.  She was seen a week ago with similar symptoms had labs and a CT scan performed.  She had a colonoscopy recently and was diagnosed with irritable bowel syndrome, she's had the symptoms for the past.  She has lower abdominal pain.  No recent antibiotic use.  She also reports chills.  The last episode of bloody diarrhea was earlier this morning.        History provided by:  Patient   used: No    Diarrhea   The primary symptoms include diarrhea. The illness began more than 7 days ago. The onset was gradual. The problem has not changed since onset.  The illness is also significant for chills and anorexia. Significant associated medical issues include inflammatory bowel disease.       Review of Systems   Constitutional: Positive for chills.   Gastrointestinal: Positive for anorexia and diarrhea.   All other systems reviewed and are negative.      Past Medical History:   Diagnosis Date   • Anxiety and depression    • Body piercing     EARS BILATERAL   • Bursitis    • Chest pain     WITH EXCEDRINE TOO MUCH CAFFEINE   • Diarrhea    • Fibromyalgia    • GERD (gastroesophageal reflux disease)    • Hernia, umbilical    • High cholesterol    • Hip pain, acute, right    • Hypertension    • IBS (irritable bowel syndrome)    • Knee pain, bilateral    • Malignant melanoma (CMS/HCC)     stage 1, left leg   • Migraines    • OCD (obsessive compulsive disorder)    • PTSD (post-traumatic stress disorder)    • Sleep apnea     CPAP USAGE   • Wears glasses        Allergies   Allergen Reactions   • Penicillins Unknown (See Comments)     'AS A BABY ALMOST PUT ME INTO A COMA'   • Morphine And Related Nausea And Vomiting   • Tetracyclines & Related Hives       Past Surgical History:   Procedure Laterality Date   • CARPAL TUNNEL RELEASE Right 1/10/2018    Procedure: ELECTIVE RIGHT CARPAL TUNNEL RELEASE;   Surgeon: Gus Ramos MD;  Location: [facility];  Service:    • CARPAL TUNNEL RELEASE Left 4/18/2018    Procedure: ELECTIVE LEFT CARPAL TUNNEL RELEASE;  Surgeon: Gus Ramos MD;  Location: [facility];  Service: Orthopedics   • CARPAL TUNNEL RELEASE     • COLONOSCOPY     • SKIN BIOPSY      STAGE 1 LEFT THIGH   • SKIN CANCER EXCISION     • TOTAL ABDOMINAL HYSTERECTOMY WITH SALPINGO OOPHORECTOMY  2004    ? Hormonal migraines       Family History   Problem Relation Age of Onset   • Rheum arthritis Mother    • Ulcerative colitis Mother    • COPD Father    • Crohn's disease Sister    • No Known Problems Maternal Aunt    • No Known Problems Maternal Uncle    • No Known Problems Paternal Aunt    • No Known Problems Paternal Uncle    • Kidney disease Maternal Grandmother    • Alzheimer's disease Maternal Grandfather    • No Known Problems Paternal Grandmother    • No Known Problems Paternal Grandfather    • Breast cancer Neg Hx    • Ovarian cancer Neg Hx        Social History     Social History   • Marital status:      Social History Main Topics   • Smoking status: Current Every Day Smoker     Packs/day: 1.00     Years: 9.00     Types: Cigarettes   • Smokeless tobacco: Never Used   • Alcohol use No   • Drug use: No   • Sexual activity: Defer     Other Topics Concern   • Not on file           Objective   Physical Exam   Constitutional: She is oriented to person, place, and time. She appears well-developed and well-nourished.   HENT:   Head: Atraumatic.   Eyes: EOM are normal.   Neck: Normal range of motion. Neck supple.   Cardiovascular: Normal rate and regular rhythm.    Pulmonary/Chest: Effort normal and breath sounds normal.   Abdominal: Soft. Bowel sounds are normal. She exhibits no distension. There is no tenderness. There is no guarding.   Musculoskeletal: Normal range of motion.   Neurological: She is alert and oriented to person, place, and time. She has normal reflexes.   Skin: Skin is warm and dry.    Psychiatric: She has a normal mood and affect.   Nursing note and vitals reviewed.      Procedures           ED Course  ED Course as of Sep 03 1518   Mon Sep 03, 2018   1509 Labs unremarkable, continues to have positive occult stool.  Has abdominal cramping.  She's had diarrhea for 10 days with intermittent blood, will start on Cipro and Flagyl and Bentyl has appointment with GI tomorrow  [CS]      ED Course User Index  [CS] Terry Conway Jr., PA-C                  MDM  Number of Diagnoses or Management Options  Diagnosis management comments: 43-year-old female with history of irritable bowel syndrome presents with intermittent diarrhea and bloody diarrhea.  Lower abdominal cramping.  Similar symptoms a week ago with labs and a CT scan symptoms persist.  Exam unremarkable.  Will repeat labs, patient has a follow-up with GI tomorrow       Amount and/or Complexity of Data Reviewed  Clinical lab tests: ordered and reviewed  Tests in the radiology section of CPT®: ordered and reviewed          Final diagnoses:   Bloody diarrhea   Abdominal cramping   History of IBS            Terry Conway Jr., PA-C  09/03/18 9683

## 2019-01-07 RX ORDER — MELOXICAM 7.5 MG/1
7.5 TABLET ORAL DAILY
Qty: 30 TABLET | Refills: 2 | OUTPATIENT
Start: 2019-01-07 | End: 2019-01-27 | Stop reason: HOSPADM

## 2019-01-27 ENCOUNTER — HOSPITAL ENCOUNTER (EMERGENCY)
Facility: HOSPITAL | Age: 44
Discharge: HOME | End: 2019-01-27
Attending: STUDENT IN AN ORGANIZED HEALTH CARE EDUCATION/TRAINING PROGRAM | Admitting: STUDENT IN AN ORGANIZED HEALTH CARE EDUCATION/TRAINING PROGRAM

## 2019-01-27 ENCOUNTER — APPOINTMENT (OUTPATIENT)
Dept: GENERAL RADIOLOGY | Facility: HOSPITAL | Age: 44
End: 2019-01-27

## 2019-01-27 VITALS
HEART RATE: 73 BPM | WEIGHT: 224.8 LBS | TEMPERATURE: 98 F | DIASTOLIC BLOOD PRESSURE: 83 MMHG | BODY MASS INDEX: 37.45 KG/M2 | OXYGEN SATURATION: 93 % | RESPIRATION RATE: 16 BRPM | SYSTOLIC BLOOD PRESSURE: 132 MMHG | HEIGHT: 65 IN

## 2019-01-27 DIAGNOSIS — J20.9 ACUTE BRONCHITIS, UNSPECIFIED ORGANISM: Primary | ICD-10-CM

## 2019-01-27 LAB
ALBUMIN SERPL-MCNC: 4.3 G/DL (ref 3.5–5)
ALBUMIN/GLOB SERPL: 1.5 G/DL (ref 1–2)
ALP SERPL-CCNC: 108 U/L (ref 38–126)
ALT SERPL W P-5'-P-CCNC: 32 U/L (ref 13–69)
ANION GAP SERPL CALCULATED.3IONS-SCNC: 10.9 MMOL/L (ref 10–20)
AST SERPL-CCNC: 20 U/L (ref 15–46)
BASOPHILS # BLD AUTO: 0.04 10*3/MM3 (ref 0–0.2)
BASOPHILS NFR BLD AUTO: 0.4 % (ref 0–2.5)
BILIRUB SERPL-MCNC: 0.6 MG/DL (ref 0.2–1.3)
BUN BLD-MCNC: 22 MG/DL (ref 7–20)
BUN/CREAT SERPL: 31.4 (ref 7.1–23.5)
CALCIUM SPEC-SCNC: 9.5 MG/DL (ref 8.4–10.2)
CHLORIDE SERPL-SCNC: 104 MMOL/L (ref 98–107)
CO2 SERPL-SCNC: 28 MMOL/L (ref 26–30)
CREAT BLD-MCNC: 0.7 MG/DL (ref 0.6–1.3)
D-DIMER, QUANTITATIVE (MAD,POW, STR): 460 NG/ML (FEU) (ref 0–500)
DEPRECATED RDW RBC AUTO: 41 FL (ref 37–54)
EOSINOPHIL # BLD AUTO: 0.33 10*3/MM3 (ref 0–0.7)
EOSINOPHIL NFR BLD AUTO: 3.5 % (ref 0–7)
ERYTHROCYTE [DISTWIDTH] IN BLOOD BY AUTOMATED COUNT: 12.3 % (ref 11.5–14.5)
FLUAV AG NPH QL: NEGATIVE
FLUBV AG NPH QL IA: NEGATIVE
GFR SERPL CREATININE-BSD FRML MDRD: 92 ML/MIN/1.73
GLOBULIN UR ELPH-MCNC: 2.9 GM/DL
GLUCOSE BLD-MCNC: 130 MG/DL (ref 74–98)
HCT VFR BLD AUTO: 45 % (ref 37–47)
HGB BLD-MCNC: 15.5 G/DL (ref 12–16)
IMM GRANULOCYTES # BLD AUTO: 0.03 10*3/MM3 (ref 0–0.06)
IMM GRANULOCYTES NFR BLD AUTO: 0.3 % (ref 0–0.6)
LYMPHOCYTES # BLD AUTO: 1.81 10*3/MM3 (ref 0.6–3.4)
LYMPHOCYTES NFR BLD AUTO: 19 % (ref 10–50)
MCH RBC QN AUTO: 31.3 PG (ref 27–31)
MCHC RBC AUTO-ENTMCNC: 34.4 G/DL (ref 30–37)
MCV RBC AUTO: 90.7 FL (ref 81–99)
MONOCYTES # BLD AUTO: 0.56 10*3/MM3 (ref 0–0.9)
MONOCYTES NFR BLD AUTO: 5.9 % (ref 0–12)
NEUTROPHILS # BLD AUTO: 6.76 10*3/MM3 (ref 2–6.9)
NEUTROPHILS NFR BLD AUTO: 70.9 % (ref 37–80)
NRBC BLD AUTO-RTO: 0 /100 WBC (ref 0–0)
PLATELET # BLD AUTO: 219 10*3/MM3 (ref 130–400)
PMV BLD AUTO: 10.4 FL (ref 6–12)
POTASSIUM BLD-SCNC: 3.9 MMOL/L (ref 3.5–5.1)
PROT SERPL-MCNC: 7.2 G/DL (ref 6.3–8.2)
RBC # BLD AUTO: 4.96 10*6/MM3 (ref 4.2–5.4)
SODIUM BLD-SCNC: 139 MMOL/L (ref 137–145)
TROPONIN I SERPL-MCNC: <0.012 NG/ML (ref 0–0.03)
WBC NRBC COR # BLD: 9.53 10*3/MM3 (ref 4.8–10.8)

## 2019-01-27 PROCEDURE — 94799 UNLISTED PULMONARY SVC/PX: CPT

## 2019-01-27 PROCEDURE — 80053 COMPREHEN METABOLIC PANEL: CPT | Performed by: PHYSICIAN ASSISTANT

## 2019-01-27 PROCEDURE — 87804 INFLUENZA ASSAY W/OPTIC: CPT | Performed by: PHYSICIAN ASSISTANT

## 2019-01-27 PROCEDURE — 85379 FIBRIN DEGRADATION QUANT: CPT | Performed by: PHYSICIAN ASSISTANT

## 2019-01-27 PROCEDURE — 84484 ASSAY OF TROPONIN QUANT: CPT | Performed by: PHYSICIAN ASSISTANT

## 2019-01-27 PROCEDURE — 63710000001 PREDNISONE PER 1 MG: Performed by: PHYSICIAN ASSISTANT

## 2019-01-27 PROCEDURE — 71046 X-RAY EXAM CHEST 2 VIEWS: CPT

## 2019-01-27 PROCEDURE — 85025 COMPLETE CBC W/AUTO DIFF WBC: CPT | Performed by: PHYSICIAN ASSISTANT

## 2019-01-27 PROCEDURE — 99283 EMERGENCY DEPT VISIT LOW MDM: CPT

## 2019-01-27 PROCEDURE — 94640 AIRWAY INHALATION TREATMENT: CPT

## 2019-01-27 PROCEDURE — 93005 ELECTROCARDIOGRAM TRACING: CPT | Performed by: PHYSICIAN ASSISTANT

## 2019-01-27 RX ORDER — PREDNISONE 20 MG/1
40 TABLET ORAL ONCE
Status: COMPLETED | OUTPATIENT
Start: 2019-01-27 | End: 2019-01-27

## 2019-01-27 RX ORDER — AZITHROMYCIN 250 MG/1
500 TABLET, FILM COATED ORAL ONCE
Status: COMPLETED | OUTPATIENT
Start: 2019-01-27 | End: 2019-01-27

## 2019-01-27 RX ORDER — PREDNISONE 20 MG/1
20 TABLET ORAL 2 TIMES DAILY
Qty: 8 TABLET | Refills: 0 | Status: SHIPPED | OUTPATIENT
Start: 2019-01-27 | End: 2020-09-16

## 2019-01-27 RX ORDER — ALBUTEROL SULFATE 90 UG/1
2 AEROSOL, METERED RESPIRATORY (INHALATION) EVERY 4 HOURS PRN
Qty: 1 INHALER | Refills: 0 | Status: SHIPPED | OUTPATIENT
Start: 2019-01-27 | End: 2020-09-16

## 2019-01-27 RX ORDER — IPRATROPIUM BROMIDE AND ALBUTEROL SULFATE 2.5; .5 MG/3ML; MG/3ML
3 SOLUTION RESPIRATORY (INHALATION) ONCE
Status: COMPLETED | OUTPATIENT
Start: 2019-01-27 | End: 2019-01-27

## 2019-01-27 RX ORDER — AZITHROMYCIN 250 MG/1
TABLET, FILM COATED ORAL
Qty: 4 TABLET | Refills: 0 | Status: SHIPPED | OUTPATIENT
Start: 2019-01-27 | End: 2020-09-16

## 2019-01-27 RX ADMIN — AZITHROMYCIN MONOHYDRATE 500 MG: 250 TABLET ORAL at 20:55

## 2019-01-27 RX ADMIN — IPRATROPIUM BROMIDE AND ALBUTEROL SULFATE 3 ML: .5; 3 SOLUTION RESPIRATORY (INHALATION) at 20:00

## 2019-01-27 RX ADMIN — PREDNISONE 40 MG: 20 TABLET ORAL at 19:47

## 2019-03-22 ENCOUNTER — HOSPITAL ENCOUNTER (OUTPATIENT)
Dept: MAMMOGRAPHY | Facility: HOSPITAL | Age: 44
Discharge: HOME | End: 2019-03-22
Payer: MEDICAID

## 2019-03-22 DIAGNOSIS — Z12.39 BREAST CANCER SCREENING: ICD-10-CM

## 2019-03-22 PROCEDURE — 77063 BREAST TOMOSYNTHESIS BI: CPT

## 2019-11-28 ENCOUNTER — HOSPITAL ENCOUNTER (EMERGENCY)
Facility: HOSPITAL | Age: 44
Discharge: HOME | End: 2019-11-28
Attending: FAMILY MEDICINE
Payer: MEDICAID

## 2019-11-28 ENCOUNTER — APPOINTMENT (OUTPATIENT)
Dept: GENERAL RADIOLOGY | Facility: HOSPITAL | Age: 44
End: 2019-11-28
Payer: MEDICAID

## 2019-11-28 VITALS
BODY MASS INDEX: 33.99 KG/M2 | HEART RATE: 103 BPM | HEIGHT: 65 IN | RESPIRATION RATE: 16 BRPM | OXYGEN SATURATION: 92 % | SYSTOLIC BLOOD PRESSURE: 118 MMHG | DIASTOLIC BLOOD PRESSURE: 80 MMHG | TEMPERATURE: 100.8 F | WEIGHT: 204 LBS

## 2019-11-28 DIAGNOSIS — J10.1 INFLUENZA A: Primary | ICD-10-CM

## 2019-11-28 LAB
RAPID INFLUENZA  B AGN: NEGATIVE
RAPID INFLUENZA A AGN: POSITIVE
S PYO AG THROAT QL: NEGATIVE

## 2019-11-28 PROCEDURE — 99283 EMERGENCY DEPT VISIT LOW MDM: CPT

## 2019-11-28 PROCEDURE — 6370000000 HC RX 637 (ALT 250 FOR IP): Performed by: FAMILY MEDICINE

## 2019-11-28 PROCEDURE — 87804 INFLUENZA ASSAY W/OPTIC: CPT

## 2019-11-28 PROCEDURE — 87880 STREP A ASSAY W/OPTIC: CPT

## 2019-11-28 PROCEDURE — 71046 X-RAY EXAM CHEST 2 VIEWS: CPT

## 2019-11-28 RX ORDER — TRIAMTERENE AND HYDROCHLOROTHIAZIDE 37.5; 25 MG/1; MG/1
1 TABLET ORAL DAILY
COMMUNITY

## 2019-11-28 RX ORDER — MESALAMINE 1.2 G/1
3600 TABLET, DELAYED RELEASE ORAL
COMMUNITY

## 2019-11-28 RX ORDER — PREDNISONE 20 MG/1
40 TABLET ORAL ONCE
Status: COMPLETED | OUTPATIENT
Start: 2019-11-28 | End: 2019-11-28

## 2019-11-28 RX ORDER — ESTRADIOL 1 MG/1
1 TABLET ORAL DAILY
COMMUNITY

## 2019-11-28 RX ORDER — OSELTAMIVIR PHOSPHATE 75 MG/1
75 CAPSULE ORAL ONCE
Status: COMPLETED | OUTPATIENT
Start: 2019-11-28 | End: 2019-11-28

## 2019-11-28 RX ORDER — HYDROXYCHLOROQUINE SULFATE 200 MG/1
200 TABLET, FILM COATED ORAL DAILY
COMMUNITY
End: 2020-02-17

## 2019-11-28 RX ORDER — OSELTAMIVIR PHOSPHATE 75 MG/1
75 CAPSULE ORAL 2 TIMES DAILY
Qty: 10 CAPSULE | Refills: 0 | Status: SHIPPED | OUTPATIENT
Start: 2019-11-28 | End: 2019-12-03

## 2019-11-28 RX ADMIN — OSELTAMIVIR PHOSPHATE 75 MG: 75 CAPSULE ORAL at 20:42

## 2019-11-28 RX ADMIN — PREDNISONE 40 MG: 20 TABLET ORAL at 20:42

## 2019-11-28 SDOH — HEALTH STABILITY: MENTAL HEALTH: HOW OFTEN DO YOU HAVE A DRINK CONTAINING ALCOHOL?: NEVER

## 2019-11-28 ASSESSMENT — PAIN SCALES - GENERAL: PAINLEVEL_OUTOF10: 6

## 2019-11-28 ASSESSMENT — ENCOUNTER SYMPTOMS
COUGH: 1
SORE THROAT: 1
RHINORRHEA: 1

## 2020-02-17 ENCOUNTER — APPOINTMENT (OUTPATIENT)
Dept: CT IMAGING | Facility: HOSPITAL | Age: 45
End: 2020-02-17
Payer: MEDICAID

## 2020-02-17 ENCOUNTER — APPOINTMENT (OUTPATIENT)
Dept: GENERAL RADIOLOGY | Facility: HOSPITAL | Age: 45
End: 2020-02-17
Payer: MEDICAID

## 2020-02-17 ENCOUNTER — HOSPITAL ENCOUNTER (EMERGENCY)
Facility: HOSPITAL | Age: 45
Discharge: HOME | End: 2020-02-17
Attending: EMERGENCY MEDICINE
Payer: MEDICAID

## 2020-02-17 VITALS
HEART RATE: 74 BPM | DIASTOLIC BLOOD PRESSURE: 73 MMHG | SYSTOLIC BLOOD PRESSURE: 117 MMHG | RESPIRATION RATE: 18 BRPM | BODY MASS INDEX: 33.32 KG/M2 | OXYGEN SATURATION: 98 % | HEIGHT: 65 IN | TEMPERATURE: 98.3 F | WEIGHT: 200 LBS

## 2020-02-17 LAB
A/G RATIO: 1.2 (ref 0.8–2)
ALBUMIN SERPL-MCNC: 4.5 G/DL (ref 3.4–4.8)
ALP BLD-CCNC: 100 U/L (ref 25–100)
ALT SERPL-CCNC: 53 U/L (ref 4–36)
ANION GAP SERPL CALCULATED.3IONS-SCNC: 13 MMOL/L (ref 3–16)
AST SERPL-CCNC: 43 U/L (ref 8–33)
BASOPHILS ABSOLUTE: 0.1 K/UL (ref 0–0.1)
BASOPHILS RELATIVE PERCENT: 0.9 %
BILIRUB SERPL-MCNC: 0.4 MG/DL (ref 0.3–1.2)
BUN BLDV-MCNC: 16 MG/DL (ref 6–20)
CALCIUM SERPL-MCNC: 10.6 MG/DL (ref 8.5–10.5)
CHLORIDE BLD-SCNC: 98 MMOL/L (ref 98–107)
CO2: 28 MMOL/L (ref 20–30)
CREAT SERPL-MCNC: 0.9 MG/DL (ref 0.4–1.2)
D DIMER: 748 NG/ML DDU
EOSINOPHILS ABSOLUTE: 0.3 K/UL (ref 0–0.4)
EOSINOPHILS RELATIVE PERCENT: 4 %
GFR AFRICAN AMERICAN: >59
GFR NON-AFRICAN AMERICAN: >60
GLOBULIN: 3.9 G/DL
GLUCOSE BLD-MCNC: 91 MG/DL (ref 74–106)
HCT VFR BLD CALC: 43.2 % (ref 37–47)
HEMOGLOBIN: 14.5 G/DL (ref 11.5–16.5)
IMMATURE GRANULOCYTES #: 0 K/UL
IMMATURE GRANULOCYTES %: 0.5 % (ref 0–5)
LIPASE: 32 U/L (ref 5.6–51.3)
LYMPHOCYTES ABSOLUTE: 2.7 K/UL (ref 1.5–4)
LYMPHOCYTES RELATIVE PERCENT: 31.8 %
MCH RBC QN AUTO: 31 PG (ref 27–32)
MCHC RBC AUTO-ENTMCNC: 33.6 G/DL (ref 31–35)
MCV RBC AUTO: 92.5 FL (ref 80–100)
MONOCYTES ABSOLUTE: 0.8 K/UL (ref 0.2–0.8)
MONOCYTES RELATIVE PERCENT: 9.5 %
NEUTROPHILS ABSOLUTE: 4.6 K/UL (ref 2–7.5)
NEUTROPHILS RELATIVE PERCENT: 53.3 %
PDW BLD-RTO: 12.5 % (ref 11–16)
PLATELET # BLD: 195 K/UL (ref 150–400)
PMV BLD AUTO: 11.3 FL (ref 6–10)
POTASSIUM SERPL-SCNC: 3.9 MMOL/L (ref 3.4–5.1)
PRO-BNP: 23 PG/ML (ref 0–900)
RBC # BLD: 4.67 M/UL (ref 3.8–5.8)
SODIUM BLD-SCNC: 139 MMOL/L (ref 136–145)
TOTAL PROTEIN: 8.4 G/DL (ref 6.4–8.3)
TROPONIN: <0.3 NG/ML
WBC # BLD: 8.6 K/UL (ref 4–11)

## 2020-02-17 PROCEDURE — 6370000000 HC RX 637 (ALT 250 FOR IP): Performed by: EMERGENCY MEDICINE

## 2020-02-17 PROCEDURE — 85025 COMPLETE CBC W/AUTO DIFF WBC: CPT

## 2020-02-17 PROCEDURE — 36415 COLL VENOUS BLD VENIPUNCTURE: CPT

## 2020-02-17 PROCEDURE — 83690 ASSAY OF LIPASE: CPT

## 2020-02-17 PROCEDURE — 71045 X-RAY EXAM CHEST 1 VIEW: CPT

## 2020-02-17 PROCEDURE — 85379 FIBRIN DEGRADATION QUANT: CPT

## 2020-02-17 PROCEDURE — 83880 ASSAY OF NATRIURETIC PEPTIDE: CPT

## 2020-02-17 PROCEDURE — 96375 TX/PRO/DX INJ NEW DRUG ADDON: CPT

## 2020-02-17 PROCEDURE — 96374 THER/PROPH/DIAG INJ IV PUSH: CPT

## 2020-02-17 PROCEDURE — 93005 ELECTROCARDIOGRAM TRACING: CPT

## 2020-02-17 PROCEDURE — 84484 ASSAY OF TROPONIN QUANT: CPT

## 2020-02-17 PROCEDURE — 80053 COMPREHEN METABOLIC PANEL: CPT

## 2020-02-17 PROCEDURE — 6360000004 HC RX CONTRAST MEDICATION: Performed by: EMERGENCY MEDICINE

## 2020-02-17 PROCEDURE — 71275 CT ANGIOGRAPHY CHEST: CPT

## 2020-02-17 PROCEDURE — 99285 EMERGENCY DEPT VISIT HI MDM: CPT

## 2020-02-17 PROCEDURE — 6360000002 HC RX W HCPCS: Performed by: EMERGENCY MEDICINE

## 2020-02-17 RX ORDER — IBUPROFEN 800 MG/1
800 TABLET ORAL EVERY 6 HOURS PRN
Qty: 30 TABLET | Refills: 0 | Status: SHIPPED | OUTPATIENT
Start: 2020-02-17

## 2020-02-17 RX ORDER — KETOROLAC TROMETHAMINE 30 MG/ML
30 INJECTION, SOLUTION INTRAMUSCULAR; INTRAVENOUS ONCE
Status: COMPLETED | OUTPATIENT
Start: 2020-02-17 | End: 2020-02-17

## 2020-02-17 RX ORDER — ONDANSETRON 2 MG/ML
4 INJECTION INTRAMUSCULAR; INTRAVENOUS ONCE
Status: COMPLETED | OUTPATIENT
Start: 2020-02-17 | End: 2020-02-17

## 2020-02-17 RX ORDER — ASPIRIN 81 MG/1
324 TABLET, CHEWABLE ORAL ONCE
Status: COMPLETED | OUTPATIENT
Start: 2020-02-17 | End: 2020-02-17

## 2020-02-17 RX ORDER — MORPHINE SULFATE 4 MG/ML
4 INJECTION, SOLUTION INTRAMUSCULAR; INTRAVENOUS ONCE
Status: COMPLETED | OUTPATIENT
Start: 2020-02-17 | End: 2020-02-17

## 2020-02-17 RX ADMIN — ASPIRIN 81 MG 324 MG: 81 TABLET ORAL at 20:03

## 2020-02-17 RX ADMIN — KETOROLAC TROMETHAMINE 30 MG: 30 INJECTION, SOLUTION INTRAMUSCULAR at 20:03

## 2020-02-17 RX ADMIN — ONDANSETRON 4 MG: 2 INJECTION INTRAMUSCULAR; INTRAVENOUS at 22:35

## 2020-02-17 RX ADMIN — IOPAMIDOL 100 ML: 755 INJECTION, SOLUTION INTRAVENOUS at 21:30

## 2020-02-17 RX ADMIN — MORPHINE SULFATE 4 MG: 4 INJECTION, SOLUTION INTRAMUSCULAR; INTRAVENOUS at 22:24

## 2020-02-17 ASSESSMENT — PAIN DESCRIPTION - FREQUENCY: FREQUENCY: CONTINUOUS

## 2020-02-17 ASSESSMENT — PAIN DESCRIPTION - ORIENTATION: ORIENTATION: MID;LEFT

## 2020-02-17 ASSESSMENT — PAIN DESCRIPTION - LOCATION: LOCATION: CHEST

## 2020-02-17 ASSESSMENT — PAIN DESCRIPTION - DESCRIPTORS: DESCRIPTORS: TIGHTNESS

## 2020-02-17 ASSESSMENT — PAIN DESCRIPTION - PROGRESSION: CLINICAL_PROGRESSION: NOT CHANGED

## 2020-02-17 ASSESSMENT — PAIN DESCRIPTION - PAIN TYPE: TYPE: ACUTE PAIN

## 2020-02-17 ASSESSMENT — PAIN SCALES - GENERAL: PAINLEVEL_OUTOF10: 4

## 2020-02-18 ENCOUNTER — APPOINTMENT (OUTPATIENT)
Dept: GENERAL RADIOLOGY | Facility: HOSPITAL | Age: 45
End: 2020-02-18

## 2020-02-18 ENCOUNTER — HOSPITAL ENCOUNTER (EMERGENCY)
Facility: HOSPITAL | Age: 45
Discharge: HOME | End: 2020-02-18
Attending: EMERGENCY MEDICINE | Admitting: EMERGENCY MEDICINE

## 2020-02-18 VITALS
HEIGHT: 65 IN | WEIGHT: 197.8 LBS | DIASTOLIC BLOOD PRESSURE: 78 MMHG | RESPIRATION RATE: 18 BRPM | OXYGEN SATURATION: 96 % | HEART RATE: 73 BPM | TEMPERATURE: 98.6 F | BODY MASS INDEX: 32.96 KG/M2 | SYSTOLIC BLOOD PRESSURE: 115 MMHG

## 2020-02-18 DIAGNOSIS — R07.9 CHEST PAIN, UNSPECIFIED TYPE: Primary | ICD-10-CM

## 2020-02-18 LAB
ALBUMIN SERPL-MCNC: 4.2 G/DL (ref 3.5–5.2)
ALBUMIN/GLOB SERPL: 1.1 G/DL
ALP SERPL-CCNC: 94 U/L (ref 39–117)
ALT SERPL W P-5'-P-CCNC: 43 U/L (ref 1–33)
ANION GAP SERPL CALCULATED.3IONS-SCNC: 12.2 MMOL/L (ref 5–15)
AST SERPL-CCNC: 24 U/L (ref 1–32)
BASOPHILS # BLD AUTO: 0.07 10*3/MM3 (ref 0–0.2)
BASOPHILS NFR BLD AUTO: 0.8 % (ref 0–1.5)
BILIRUB SERPL-MCNC: 0.3 MG/DL (ref 0.2–1.2)
BUN BLD-MCNC: 17 MG/DL (ref 6–20)
BUN/CREAT SERPL: 21 (ref 7–25)
CALCIUM SPEC-SCNC: 10.5 MG/DL (ref 8.6–10.5)
CHLORIDE SERPL-SCNC: 99 MMOL/L (ref 98–107)
CO2 SERPL-SCNC: 29.8 MMOL/L (ref 22–29)
CREAT BLD-MCNC: 0.81 MG/DL (ref 0.57–1)
DEPRECATED RDW RBC AUTO: 41.2 FL (ref 37–54)
EOSINOPHIL # BLD AUTO: 0.28 10*3/MM3 (ref 0–0.4)
EOSINOPHIL NFR BLD AUTO: 3.3 % (ref 0.3–6.2)
ERYTHROCYTE [DISTWIDTH] IN BLOOD BY AUTOMATED COUNT: 12.3 % (ref 12.3–15.4)
GFR SERPL CREATININE-BSD FRML MDRD: 77 ML/MIN/1.73
GLOBULIN UR ELPH-MCNC: 3.9 GM/DL
GLUCOSE BLD-MCNC: 155 MG/DL (ref 65–99)
HCT VFR BLD AUTO: 43.2 % (ref 34–46.6)
HGB BLD-MCNC: 14.8 G/DL (ref 12–15.9)
HOLD SPECIMEN: NORMAL
HOLD SPECIMEN: NORMAL
IMM GRANULOCYTES # BLD AUTO: 0.04 10*3/MM3 (ref 0–0.05)
IMM GRANULOCYTES NFR BLD AUTO: 0.5 % (ref 0–0.5)
LYMPHOCYTES # BLD AUTO: 2.05 10*3/MM3 (ref 0.7–3.1)
LYMPHOCYTES NFR BLD AUTO: 23.9 % (ref 19.6–45.3)
MCH RBC QN AUTO: 31.6 PG (ref 26.6–33)
MCHC RBC AUTO-ENTMCNC: 34.3 G/DL (ref 31.5–35.7)
MCV RBC AUTO: 92.1 FL (ref 79–97)
MONOCYTES # BLD AUTO: 0.49 10*3/MM3 (ref 0.1–0.9)
MONOCYTES NFR BLD AUTO: 5.7 % (ref 5–12)
NEUTROPHILS # BLD AUTO: 5.66 10*3/MM3 (ref 1.7–7)
NEUTROPHILS NFR BLD AUTO: 65.8 % (ref 42.7–76)
NRBC BLD AUTO-RTO: 0 /100 WBC (ref 0–0.2)
PLATELET # BLD AUTO: 200 10*3/MM3 (ref 140–450)
PMV BLD AUTO: 10.7 FL (ref 6–12)
POTASSIUM BLD-SCNC: 3.9 MMOL/L (ref 3.5–5.2)
PROT SERPL-MCNC: 8.1 G/DL (ref 6–8.5)
RBC # BLD AUTO: 4.69 10*6/MM3 (ref 3.77–5.28)
SODIUM BLD-SCNC: 141 MMOL/L (ref 136–145)
TROPONIN I SERPL-MCNC: 0 NG/ML (ref 0–0.05)
TROPONIN T SERPL-MCNC: <0.01 NG/ML (ref 0–0.03)
WBC NRBC COR # BLD: 8.59 10*3/MM3 (ref 3.4–10.8)
WHOLE BLOOD HOLD SPECIMEN: NORMAL
WHOLE BLOOD HOLD SPECIMEN: NORMAL

## 2020-02-18 PROCEDURE — 85025 COMPLETE CBC W/AUTO DIFF WBC: CPT | Performed by: EMERGENCY MEDICINE

## 2020-02-18 PROCEDURE — 85007 BL SMEAR W/DIFF WBC COUNT: CPT | Performed by: EMERGENCY MEDICINE

## 2020-02-18 PROCEDURE — 25010000002 KETOROLAC TROMETHAMINE PER 15 MG: Performed by: EMERGENCY MEDICINE

## 2020-02-18 PROCEDURE — 84484 ASSAY OF TROPONIN QUANT: CPT | Performed by: EMERGENCY MEDICINE

## 2020-02-18 PROCEDURE — 99284 EMERGENCY DEPT VISIT MOD MDM: CPT

## 2020-02-18 PROCEDURE — 96374 THER/PROPH/DIAG INJ IV PUSH: CPT

## 2020-02-18 PROCEDURE — 71045 X-RAY EXAM CHEST 1 VIEW: CPT

## 2020-02-18 PROCEDURE — 80053 COMPREHEN METABOLIC PANEL: CPT | Performed by: EMERGENCY MEDICINE

## 2020-02-18 PROCEDURE — 93005 ELECTROCARDIOGRAM TRACING: CPT | Performed by: EMERGENCY MEDICINE

## 2020-02-18 RX ORDER — KETOROLAC TROMETHAMINE 30 MG/ML
30 INJECTION, SOLUTION INTRAMUSCULAR; INTRAVENOUS ONCE
Status: COMPLETED | OUTPATIENT
Start: 2020-02-18 | End: 2020-02-18

## 2020-02-18 RX ORDER — SODIUM CHLORIDE 0.9 % (FLUSH) 0.9 %
10 SYRINGE (ML) INJECTION AS NEEDED
Status: DISCONTINUED | OUTPATIENT
Start: 2020-02-18 | End: 2020-02-18 | Stop reason: HOSPADM

## 2020-02-18 RX ORDER — ASPIRIN 325 MG
325 TABLET ORAL ONCE
Status: COMPLETED | OUTPATIENT
Start: 2020-02-18 | End: 2020-02-18

## 2020-02-18 RX ADMIN — ASPIRIN 325 MG ORAL TABLET 325 MG: 325 PILL ORAL at 13:52

## 2020-02-18 RX ADMIN — KETOROLAC TROMETHAMINE 30 MG: 30 INJECTION, SOLUTION INTRAMUSCULAR; INTRAVENOUS at 14:22

## 2020-02-18 NOTE — ED PROVIDER NOTES
[address]  eMERGENCY dEPARTMENT eNCOUnter      Pt Name: Michaela Merino  MRN: [id number]  YOB: 1975  Date of evaluation: 3/00/1866  Provider: Loki Serna MD    CHIEF COMPLAINT       Chief Complaint   Patient presents with    Chest Pain     Onset \"sometime in the middle of the night\"         HISTORY OF PRESENT ILLNESS  (Location/Symptom, Timing/Onset, Context/Setting, Quality, Duration,Modifying Factors, Severity.)   Michaela Johnson is a 38 y.o. female who presents to the emergency department with chest pain which started about 18 hours ago. Pain has been constant and achy in nature but occasionally sharp. No pleuritic chest pain. No radiation of pain. No shortness of breath. She was diagnosed with influenza 4 days ago. No fever for the last 24 hours. + smoker  + hypertension  - cholesterol  - DM  - Fhx    Nursing notes were reviewed. REVIEW OF SYSTEMS    (2-9 systems for level 4, 10 or more for level 5)   ROS:  General:  No fevers, no chills, no weakness  Cardiovascular:  + chest pain, no palpitations  Respiratory:  No shortness of breath, no cough, no wheezing  Gastrointestinal:  No pain, no nausea, no vomiting, no diarrhea  Musculoskeletal:  No muscle pain, no joint pain  Skin:  No rash, no easy bruising  Neurologic:  No speech problems, no headache, no extremity numbness, no extremity  tingling, no extremity weakness  Psychiatric:  No anxiety  Genitourinary:  No dysuria, no hematuria    Except as noted above the remainder of the review of systems was reviewed and negative.        PAST MEDICAL HISTORY     Past Medical History:   Diagnosis Date    Anxiety     Arthritis     Depression     Fibromyalgia     Hypertension     Panic attacks     Ulcerative colitis ([facility] [address]          SURGICAL HISTORY       Past Surgical History:   Procedure Laterality Date    CARPAL TUNNEL RELEASE      HYSTERECTOMY      SKIN CANCER EXCISION           CURRENT MEDICATIONS       Previous Medications    ESTRADIOL (ESTRACE) 1 MG TABLET    Take 1 mg by mouth daily    MESALAMINE (LIALDA) 1.2 G EC TABLET    Take 2,400 mg by mouth daily (with breakfast)    TRIAMTERENE-HYDROCHLOROTHIAZIDE (MAXZIDE-25) 37.5-25 MG PER TABLET    Take 1 tablet by mouth daily       ALLERGIES     Pcn [penicillins]; Morphine; and Tetracyclines & related    FAMILY HISTORY     History reviewed. No pertinent family history.        SOCIAL HISTORY       Social History     Socioeconomic History    Marital status:      Spouse name: None    Number of children: None    Years of education: None    Highest education level: None   Occupational History    None   Social Needs    Financial resource strain: None    Food insecurity:     Worry: None     Inability: None    Transportation needs:     Medical: None     Non-medical: None   Tobacco Use    Smoking status: Current Every Day Smoker     Packs/day: 1.00     Years: 11.00     Pack years: 11.00     Types: Cigarettes    Smokeless tobacco: Never Used   Substance and Sexual Activity    Alcohol use: Never     Frequency: Never    Drug use: Never    Sexual activity: None   Lifestyle    Physical activity:     Days per week: None     Minutes per session: None    Stress: None   Relationships    Social connections:     Talks on phone: None     Gets together: None     Attends Buddhism service: None     Active member of club or organization: None     Attends meetings of clubs or organizations: None     Relationship status: None    Intimate partner violence:     Fear of current or ex partner: None     Emotionally abused: None     Physically abused: None     Forced sexual activity: None   Other Topics Concern    None   Social History Narrative    None         PHYSICAL EXAM    (up to 7 for level 4, 8 or more for level 5)     ED Triage Vitals [02/17/20 1925]   BP Temp Temp Source Pulse Resp SpO2 Height Weight   (!) 146/88 98.3 °F (36.8 °C) Oral 88 18 98 % 5' 80.0 - 100.0 fL    MCH 31.0 27.0 - 32.0 pg    MCHC 33.6 31.0 - 35.0 g/dL    RDW 12.5 11.0 - 16.0 %    Platelets 837 024 - 122 K/uL    MPV 11.3 (H) 6.0 - 10.0 fL    Neutrophils % 53.3 %    Immature Granulocytes % 0.5 0.0 - 5.0 %    Lymphocytes % 31.8 %    Monocytes % 9.5 %    Eosinophils % 4.0 %    Basophils % 0.9 %    Neutrophils Absolute 4.6 2.0 - 7.5 K/uL    Immature Granulocytes # 0.0 K/uL    Lymphocytes Absolute 2.7 1.5 - 4.0 K/uL    Monocytes Absolute 0.8 0.2 - 0.8 K/uL    Eosinophils Absolute 0.3 0.0 - 0.4 K/uL    Basophils Absolute 0.1 0.0 - 0.1 K/uL   Comprehensive Metabolic Panel   Result Value Ref Range    Sodium 139 136 - 145 mmol/L    Potassium 3.9 3.4 - 5.1 mmol/L    Chloride 98 98 - 107 mmol/L    CO2 28 20 - 30 mmol/L    Anion Gap 13 3 - 16    Glucose 91 74 - 106 mg/dL    BUN 16 6 - 20 mg/dL    CREATININE 0.9 0.4 - 1.2 mg/dL    GFR Non-African American >60 >59    GFR African American >59 >59    Calcium 10.6 (H) 8.5 - 10.5 mg/dL    Total Protein 8.4 (H) 6.4 - 8.3 g/dL    Alb 4.5 3.4 - 4.8 g/dL    Albumin/Globulin Ratio 1.2 0.8 - 2.0    Total Bilirubin 0.4 0.3 - 1.2 mg/dL    Alkaline Phosphatase 100 25 - 100 U/L    ALT 53 (H) 4 - 36 U/L    AST 43 (H) 8 - 33 U/L    Globulin 3.9 g/dL   D-Dimer, Quantitative   Result Value Ref Range    D-Dimer, Quant 748 (HH) <600 ng/mL DDU   Lipase   Result Value Ref Range    Lipase 32.0 5.6 - 51.3 U/L   Troponin   Result Value Ref Range    Troponin <0.30 <0.30 ng/mL        All other labs were within normal range or not returned as of this dictation. EMERGENCY DEPARTMENT COURSE and DIFFERENTIAL DIAGNOSIS/MDM:   Vitals:  Vitals:    02/17/20 1925   BP: (!) 146/88   Pulse: 88   Resp: 18   Temp: 98.3 °F (36.8 °C)   TempSrc: Oral   SpO2: 98%   Weight: 200 lb (90.7 kg)   Height: 5' 5\" (1.651 m)         The patient will follow-up with their PCP in 1-2 days for reevaluation.   If the patient or family members have any further concerns or any worsening symptoms they will return

## 2020-02-18 NOTE — ED PROVIDER NOTES
Subjective   44-year-old female presents to the ED with a chief complaint of chest pain.  She is complaining of left-sided chest pain that she describes as a dull ache.  The pain is been present since yesterday morning.  Pain does not radiate.  There are no associated symptoms.  She denies cough shortness of breath or wheeze.  No nausea vomiting diarrhea abdominal pain.  No lightheadedness or dizziness.  No fever chills.  Patient was seen and evaluated at an outside facility last night.  There she had 2- troponins and a negative CT pulmonary embolism.  Review of records from care everywhere does show all this negative testing.  She denies other complaints at this time.          Review of Systems   Constitutional: Negative for fatigue and fever.   Respiratory: Negative for shortness of breath and wheezing.    Cardiovascular: Positive for chest pain. Negative for palpitations and leg swelling.   All other systems reviewed and are negative.      Past Medical History:   Diagnosis Date   • Anxiety and depression    • Body piercing     EARS BILATERAL   • Bursitis    • Chest pain     WITH EXCEDRINE TOO MUCH CAFFEINE   • Diarrhea    • Fibromyalgia    • GERD (gastroesophageal reflux disease)    • Hernia, umbilical    • High cholesterol    • Hip pain, acute, right    • Hypertension    • IBS (irritable bowel syndrome)    • Knee pain, bilateral    • Malignant melanoma (CMS/HCC)     stage 1, left leg   • Migraines    • OCD (obsessive compulsive disorder)    • PTSD (post-traumatic stress disorder)    • Sleep apnea     CPAP USAGE   • Wears glasses        Allergies   Allergen Reactions   • Penicillins Unknown (See Comments)     'AS A BABY ALMOST PUT ME INTO A COMA'   • Morphine And Related Nausea And Vomiting   • Tetracyclines & Related Hives       Past Surgical History:   Procedure Laterality Date   • CARPAL TUNNEL RELEASE Right 1/10/2018    Procedure: ELECTIVE RIGHT CARPAL TUNNEL RELEASE;  Surgeon: Gus Ramos MD;  Location:  [facility] OR;  Service:    • CARPAL TUNNEL RELEASE Left 4/18/2018    Procedure: ELECTIVE LEFT CARPAL TUNNEL RELEASE;  Surgeon: Gus Ramos MD;  Location: [facility] OR;  Service: Orthopedics   • CARPAL TUNNEL RELEASE     • COLONOSCOPY     • SKIN BIOPSY      STAGE 1 LEFT THIGH   • SKIN CANCER EXCISION     • TOTAL ABDOMINAL HYSTERECTOMY WITH SALPINGO OOPHORECTOMY  2004    ? Hormonal migraines       Family History   Problem Relation Age of Onset   • Rheum arthritis Mother    • Ulcerative colitis Mother    • COPD Father    • Crohn's disease Sister    • No Known Problems Maternal Aunt    • No Known Problems Maternal Uncle    • No Known Problems Paternal Aunt    • No Known Problems Paternal Uncle    • Kidney disease Maternal Grandmother    • Alzheimer's disease Maternal Grandfather    • No Known Problems Paternal Grandmother    • No Known Problems Paternal Grandfather    • Breast cancer Neg Hx    • Ovarian cancer Neg Hx        Social History     Socioeconomic History   • Marital status: Single     Spouse name: Not on file   • Number of children: Not on file   • Years of education: Not on file   • Highest education level: Not on file   Tobacco Use   • Smoking status: Current Every Day Smoker     Packs/day: 1.00     Years: 9.00     Pack years: 9.00     Types: Cigarettes   • Smokeless tobacco: Never Used   Substance and Sexual Activity   • Alcohol use: No   • Drug use: No   • Sexual activity: Defer           Objective   Physical Exam   Constitutional: She is oriented to person, place, and time. She appears well-developed and well-nourished. No distress.   HENT:   Head: Normocephalic and atraumatic.   Nose: Nose normal.   Eyes: Conjunctivae and EOM are normal.   Cardiovascular: Normal rate, regular rhythm and intact distal pulses.   Pulmonary/Chest: Effort normal and breath sounds normal. No respiratory distress. She exhibits tenderness.   Mild left anterior chest wall tenderness to palpation   Abdominal: Soft. She exhibits no  distension. There is no tenderness. There is no guarding.   Musculoskeletal: She exhibits no edema or deformity.   Neurological: She is alert and oriented to person, place, and time. No cranial nerve deficit.   Skin: She is not diaphoretic.   Nursing note and vitals reviewed.      Procedures           ED Course  ED Course as of Feb 18 1614   Tue Feb 18, 2020   1319 EKG interpreted by me.  Sinus rhythm.  Rate of 84.  No ST segment or T wave abnormalities.  Normal EKG    [CG]      ED Course User Index  [CG] Franklyn Ricci, DO                                           MDM  44-year-old female presents to the ED with a chief complaint of chest pain.  Chest pain is been ongoing for greater than 24 hours.  Had a negative work-up last night at an outside facility with a negative CT PE and 2- troponins.  On arrival today the EKG is normal and nonischemic.  Chest x-ray is negative for acute process.  Troponin is negative.  Heart score of 2.  Given the 2- work-ups in the last 24 hours the patient is appropriate for discharge to follow-up outpatient with cardiology.  She is agreeable to this plan.      Final diagnoses:   Chest pain, unspecified type            Franklyn Ricci DO  02/18/20 1615

## 2020-02-18 NOTE — ED TRIAGE NOTES
Pt. Advised that morphine makes her nauseated with vomiting. MD notified and order given for [id number].

## 2020-09-13 ENCOUNTER — HOSPITAL ENCOUNTER (EMERGENCY)
Facility: HOSPITAL | Age: 45
Discharge: HOME | End: 2020-09-13
Attending: EMERGENCY MEDICINE
Payer: MEDICAID

## 2020-09-13 ENCOUNTER — APPOINTMENT (OUTPATIENT)
Dept: GENERAL RADIOLOGY | Facility: HOSPITAL | Age: 45
End: 2020-09-13
Payer: MEDICAID

## 2020-09-13 VITALS
SYSTOLIC BLOOD PRESSURE: 146 MMHG | BODY MASS INDEX: 33.32 KG/M2 | OXYGEN SATURATION: 99 % | RESPIRATION RATE: 20 BRPM | HEIGHT: 65 IN | TEMPERATURE: 98.6 F | HEART RATE: 98 BPM | DIASTOLIC BLOOD PRESSURE: 89 MMHG | WEIGHT: 200 LBS

## 2020-09-13 PROCEDURE — 73610 X-RAY EXAM OF ANKLE: CPT

## 2020-09-13 PROCEDURE — 6370000000 HC RX 637 (ALT 250 FOR IP): Performed by: EMERGENCY MEDICINE

## 2020-09-13 PROCEDURE — 99283 EMERGENCY DEPT VISIT LOW MDM: CPT

## 2020-09-13 RX ORDER — NABUMETONE 750 MG/1
750 TABLET, FILM COATED ORAL 2 TIMES DAILY
COMMUNITY
End: 2022-10-11

## 2020-09-13 RX ORDER — DIVALPROEX SODIUM 250 MG/1
500 TABLET, EXTENDED RELEASE ORAL DAILY
COMMUNITY

## 2020-09-13 RX ORDER — DULOXETIN HYDROCHLORIDE 30 MG/1
30 CAPSULE, DELAYED RELEASE ORAL DAILY
COMMUNITY

## 2020-09-13 RX ORDER — IBUPROFEN 600 MG/1
600 TABLET ORAL ONCE
Status: COMPLETED | OUTPATIENT
Start: 2020-09-13 | End: 2020-09-13

## 2020-09-13 RX ORDER — HYDROXYCHLOROQUINE SULFATE 200 MG/1
200 TABLET, FILM COATED ORAL DAILY
COMMUNITY
End: 2022-10-11

## 2020-09-13 RX ADMIN — IBUPROFEN 600 MG: 600 TABLET ORAL at 16:52

## 2020-09-13 ASSESSMENT — PAIN DESCRIPTION - ORIENTATION: ORIENTATION: RIGHT

## 2020-09-13 ASSESSMENT — PAIN DESCRIPTION - FREQUENCY: FREQUENCY: CONTINUOUS

## 2020-09-13 ASSESSMENT — PAIN SCALES - GENERAL
PAINLEVEL_OUTOF10: 7
PAINLEVEL_OUTOF10: 7

## 2020-09-13 ASSESSMENT — PAIN DESCRIPTION - DESCRIPTORS: DESCRIPTORS: ACHING;CONSTANT

## 2020-09-13 ASSESSMENT — PAIN DESCRIPTION - LOCATION: LOCATION: ANKLE

## 2020-09-13 ASSESSMENT — PAIN DESCRIPTION - PAIN TYPE: TYPE: ACUTE PAIN

## 2020-09-13 ASSESSMENT — PAIN DESCRIPTION - PROGRESSION: CLINICAL_PROGRESSION: GRADUALLY WORSENING

## 2020-09-13 ASSESSMENT — PAIN DESCRIPTION - ONSET: ONSET: SUDDEN

## 2020-09-13 NOTE — ED NOTES
Applied cold pack wrapped in pillow case on patient's Right ankle.      Dana Saavedra  09/13/20 [id number]

## 2020-09-13 NOTE — ED NOTES
Discharge instructions reviewed with pt and understanding verbalized, pt given crutches per MD request. Pt assisted to family in [facility] via wheelchair.       Narinder Balbuena RN  09/13/20 9142

## 2020-09-13 NOTE — ED PROVIDER NOTES
[facility] ENCOUNTER      Pt Name: Michaela Waite  MRN: [id number]  [place] 1975  Date of evaluation: 9/13/2020  Provider: Vinayak Iraheta DO    CHIEF COMPLAINT       Chief Complaint   Patient presents with    Ankle Pain         HISTORY OF PRESENT ILLNESS   (Location/Symptom, Timing/Onset, Context/Setting, Quality, Duration, Modifying Factors, Severity)  Note limiting factors. Michaela Waite is a 41 y.o. female who presents to the emergency department with complaint of right lateral ankle pain. Patient reports he was walking on the steps when she tripped and twisted her ankle. Reports she inverted her ankle. Reports pain in the lateral aspect of her ankle since that time. Denies hitting her head, loss of consciousness or any other trauma. Denies numbness weakness or tingling distally. Has not taken any medications for the pain. Appropriate PPE was used including an eye shield, gloves, N95 mask, surgical mask during the entire patient encounter and exam.  If necessary (pt being intubated or aerosolizing equipment in use) a gown was also used. Nursing Notes were reviewed.       PAST MEDICAL HISTORY     Past Medical History:   Diagnosis Date    Anxiety     Arthritis     Depression     Fibromyalgia     Hypertension     Panic attacks     Ulcerative colitis ([facility] [address]          SURGICAL HISTORY       Past Surgical History:   Procedure Laterality Date    CARPAL TUNNEL RELEASE      HYSTERECTOMY      SKIN CANCER EXCISION           CURRENT MEDICATIONS       Previous Medications    DIVALPROEX (DEPAKOTE ER) 250 MG EXTENDED RELEASE TABLET    Take 250 mg by mouth daily    DULOXETINE (CYMBALTA) 30 MG EXTENDED RELEASE CAPSULE    Take 30 mg by mouth daily    ESTRADIOL (ESTRACE) 1 MG TABLET    Take 1 mg by mouth daily    HYDROXYCHLOROQUINE (PLAQUENIL) 200 MG TABLET    Take 200 mg by mouth daily    IBUPROFEN (ADVIL;MOTRIN) 800 MG TABLET    Take 1 tablet by mouth every 6 hours as needed for Pain    MESALAMINE (LIALDA) 1.2 G EC TABLET    Take 2,400 mg by mouth daily (with breakfast)    NABUMETONE (RELAFEN) 750 MG TABLET    Take 750 mg by mouth 2 times daily    TRIAMTERENE-HYDROCHLOROTHIAZIDE (MAXZIDE-25) 37.5-25 MG PER TABLET    Take 1 tablet by mouth daily       ALLERGIES     Pcn [penicillins]; Morphine; and Tetracyclines & related    FAMILY HISTORY     No family history on file.        SOCIAL HISTORY       Social History     Socioeconomic History    Marital status:      Spouse name: Not on file    Number of children: Not on file    Years of education: Not on file    Highest education level: Not on file   Occupational History    Not on file   Social Needs    Financial resource strain: Not on file    Food insecurity     Worry: Not on file     Inability: Not on file    Transportation needs     Medical: Not on file     Non-medical: Not on file   Tobacco Use    Smoking status: Former Smoker     Packs/day: 0.00     Years: 11.00     Pack years: 0.00     Types: Cigarettes    Smokeless tobacco: Never Used   Substance and Sexual Activity    Alcohol use: Never     Frequency: Never    Drug use: Never    Sexual activity: Not on file   Lifestyle    Physical activity     Days per week: Not on file     Minutes per session: Not on file    Stress: Not on file   Relationships    Social connections     Talks on phone: Not on file     Gets together: Not on file     Attends Yarsani service: Not on file     Active member of club or organization: Not on file     Attends meetings of clubs or organizations: Not on file     Relationship status: Not on file    Intimate partner violence     Fear of current or ex partner: Not on file     Emotionally abused: Not on file     Physically abused: Not on file     Forced sexual activity: Not on file   Other Topics Concern    Not on file   Social History Narrative    Not on file       SCREENINGS               Review of Systems  Constitutional:  Denies fever, chills  Eyes: denies eye problems  HEENT: denies sore throat or ear pain  Respiratory: denies cough or shortness of breath  Cardiovascular: denies chest pain, palpitations  GI: denies abdominal pain, nausea, vomiting, or diarrhea  Musculoskeletal: Right lateral ankle pain  Skin: denies rash  Neurologic: denies focal weakness or sensory changes    Except as noted above the remainder of the review of systems was reviewed and negative. PHYSICAL EXAM    (up to 7 for level 4, 8 or more for level 5)     ED Triage Vitals [09/13/20 1602]   BP Temp Temp Source Pulse Resp SpO2 Height Weight   123/86 98.6 °F (37 °C) Oral 101 20 99 % 5' 5\" (1.651 m) 200 lb (90.7 kg)       General appearance: well-developed, well-nourished, no acute distress, nontoxic appearance  HENT: normocephalic, atraumatic, oropharynx moist, nares patent  Neck: normal range of motion, no tenderness, trachea midline, no stridor  Respiratory: normal breath sounds, non labored breathing pattern  Cardiovascular: normal heart rate, normal rhythm  GI: nontender, bowel sounds normal, soft, nondistended, no pulsatile masses  Musculoskeletal: Tenderness to palpation in the right ankle over the lateral side, swelling noted, decreased range of motion secondary to pain, no tenderness palpation of the forefoot, 2+ DP pulse distally, cap refill less than 2 seconds, full range of motion in the knee with flexion extension, intact distal pulses, no clubbing, cyanosis. Good range of motion  Back: no midline tenderness to palpation, step-off or deformity  Integument: warm, dry, no erythema, no rash, < 2 second cap refill  Neurologic: alert and oriented ×3, no focal deficits appreciated    DIAGNOSTIC RESULTS       RADIOLOGY:   Interpretation per the Radiologist below, if available at the time of this note:    XR ANKLE RIGHT (MIN 3 VIEWS)   Final Result   Impression:   1. Acute fracture distal fibula.    2. Lateral subluxation the tibiotalar joint. LABS:  Labs Reviewed - No data to display    All other labs were within normal range or not returned as of this dictation. EMERGENCY DEPARTMENT COURSE and DIFFERENTIAL DIAGNOSIS/MDM:   Vitals:    Vitals:    09/13/20 1602   BP: 123/86   Pulse: 101   Resp: 20   Temp: 98.6 °F (37 °C)   TempSrc: Oral   SpO2: 99%   Weight: 200 lb (90.7 kg)   Height: 5' 5\" (1.651 m)         MDM  52-year-old female presents the emergency department complaint of right ankle pain after an inversion injury. Vital signs stable. Physical exam as above. She appears alert and awake and overall nontoxic. Reports right lateral ankle pain but denies any other trauma. She is neurovascular intact in the lower extremity. Ice was placed and patient given Motrin. X-ray did show a distal fibular fracture as well as mild lateral subluxation of the talus relative to the talar tibial plafond. Will place in a splint, give crutches and have her follow-up with orthopedic surgery. Splint placed. Discussed results with the patient. She can follow-up with Dr. Bard Lee he is an outpatient and take Tylenol for pain. Return precautions given if she has numbness weakness or tingling distally. She reports understanding and agrees with discharge plan. After splint was placed patient still neurovascular intact. Cap refill less than 2 seconds. Normal sensation. Denies any other complaints at this time. CONSULTS:  None      FINAL IMPRESSION      1.  Closed fracture of distal end of right fibula, unspecified fracture morphology, initial encounter          DISPOSITION/PLAN   DISPOSITION        PATIENT REFERRED TO:  Sky Friedman  [address] [facility]  [phone]    Schedule an appointment as soon as possible for a visit in 2 days  As needed, If symptoms worsen    Haider Paris MD  [place] [address]  [phone]    Schedule an appointment as soon as possible for a visit in 2 days  As needed, If symptoms worsen      DISCHARGE MEDICATIONS:  New Prescriptions    No medications on file          (Please note that portions of this note were completed with a voice recognition program.  Efforts were made to edit the dictations but occasionally words are mis-transcribed.)    Jodie Lucero DO (electronically signed)  Attending Emergency Physician        Jodie Lucero DO  09/13/20 1418

## 2020-09-16 ENCOUNTER — OFFICE VISIT (OUTPATIENT)
Dept: ORTHOPEDIC SURGERY | Facility: CLINIC | Age: 45
End: 2020-09-16

## 2020-09-16 VITALS — RESPIRATION RATE: 18 BRPM | BODY MASS INDEX: 33.32 KG/M2 | WEIGHT: 200 LBS | HEIGHT: 65 IN

## 2020-09-16 DIAGNOSIS — S82.831A CLOSED FRACTURE OF DISTAL END OF RIGHT FIBULA, UNSPECIFIED FRACTURE MORPHOLOGY, INITIAL ENCOUNTER: ICD-10-CM

## 2020-09-16 DIAGNOSIS — S99.911A ANKLE INJURY, RIGHT, INITIAL ENCOUNTER: Primary | ICD-10-CM

## 2020-09-16 DIAGNOSIS — S93.491A SYNDESMOTIC ANKLE SPRAIN, RIGHT, INITIAL ENCOUNTER: ICD-10-CM

## 2020-09-16 PROCEDURE — 99213 OFFICE O/P EST LOW 20 MIN: CPT | Performed by: PHYSICIAN ASSISTANT

## 2020-09-16 RX ORDER — DULOXETIN HYDROCHLORIDE 30 MG/1
30 CAPSULE, DELAYED RELEASE ORAL DAILY
COMMUNITY
Start: 2020-07-20

## 2020-09-16 RX ORDER — ATORVASTATIN CALCIUM 20 MG/1
TABLET, FILM COATED ORAL
COMMUNITY
Start: 2020-08-04

## 2020-09-16 RX ORDER — NABUMETONE 750 MG/1
750 TABLET, FILM COATED ORAL DAILY
COMMUNITY

## 2020-09-16 RX ORDER — OMEPRAZOLE 20 MG/1
20 TABLET, DELAYED RELEASE ORAL DAILY
COMMUNITY
Start: 2020-08-11

## 2020-09-16 RX ORDER — AMLODIPINE BESYLATE 5 MG/1
TABLET ORAL
COMMUNITY
Start: 2020-08-04

## 2020-09-16 NOTE — PROGRESS NOTES
Subjective   Patient ID: Vanna Nicolas is a 45 y.o.  female  Injury of the Right Ankle (Patient here today for right ankle injury on 9/13/20. She fell down steps at her home. Went to [facility] ER that day, had xrays, diagnosed with ankle fracture. She is wearing a splint and using walker. )         History of Present Illness  Patient presents as a new patient with complaints of right ankle pain after an injury that occurred on 9/13/2020.  She fell down steps at her home.  She went to [facility] emergency room immediately after the accident diagnosed with an ankle fracture placed in a splint.  Patient denies numbness or tingling       Pain Location: Ankle  Pain Orientation: Right                             Pain Intervention(s): Home medication(ibuprofen, elevate, rest)  Result of Injury: Yes(fall down steps)       Past Medical History:   Diagnosis Date   • Anxiety and depression    • Body piercing     EARS BILATERAL   • Bursitis    • Chest pain     WITH EXCEDRINE TOO MUCH CAFFEINE   • Diarrhea    • Fibromyalgia    • GERD (gastroesophageal reflux disease)    • Hernia, umbilical    • High cholesterol    • Hip pain, acute, right    • Hypertension    • IBS (irritable bowel syndrome)    • Knee pain, bilateral    • Malignant melanoma (CMS/HCC)     stage 1, left leg   • Migraines    • OCD (obsessive compulsive disorder)    • PTSD (post-traumatic stress disorder)    • Sleep apnea     CPAP USAGE   • Wears glasses         Past Surgical History:   Procedure Laterality Date   • CARPAL TUNNEL RELEASE Right 1/10/2018    Procedure: ELECTIVE RIGHT CARPAL TUNNEL RELEASE;  Surgeon: Gus Ramos MD;  Location: [facility];  Service:    • CARPAL TUNNEL RELEASE Left 4/18/2018    Procedure: ELECTIVE LEFT CARPAL TUNNEL RELEASE;  Surgeon: Gus Ramos MD;  Location: [facility];  Service: Orthopedics   • CARPAL TUNNEL RELEASE     • COLONOSCOPY     • SKIN BIOPSY      STAGE 1 LEFT THIGH   • SKIN CANCER EXCISION     • TOTAL ABDOMINAL  HYSTERECTOMY WITH SALPINGO OOPHORECTOMY  2004    ? Hormonal migraines       Family History   Problem Relation Age of Onset   • Rheum arthritis Mother    • Ulcerative colitis Mother    • COPD Father    • Crohn's disease Sister    • No Known Problems Maternal Aunt    • No Known Problems Maternal Uncle    • No Known Problems Paternal Aunt    • No Known Problems Paternal Uncle    • Kidney disease Maternal Grandmother    • Alzheimer's disease Maternal Grandfather    • No Known Problems Paternal Grandmother    • No Known Problems Paternal Grandfather    • Breast cancer Neg Hx    • Ovarian cancer Neg Hx        Social History     Socioeconomic History   • Marital status: Single     Spouse name: Not on file   • Number of children: Not on file   • Years of education: Not on file   • Highest education level: Not on file   Occupational History   • Occupation: unemployed   Tobacco Use   • Smoking status: Current Every Day Smoker     Packs/day: 1.00     Years: 9.00     Pack years: 9.00   • Smokeless tobacco: Never Used   • Tobacco comment: vaping, quit smoking August 2020   Substance and Sexual Activity   • Alcohol use: No   • Drug use: No   • Sexual activity: Defer   Social History Narrative    Right hand dominant         Current Outpatient Medications:   •  amLODIPine (NORVASC) 5 MG tablet, , Disp: , Rfl:   •  atorvastatin (LIPITOR) 20 MG tablet, , Disp: , Rfl:   •  divalproex (DEPAKOTE) 500 MG 24 hr tablet, Take 1 tablet by mouth Every Night., Disp: 90 tablet, Rfl: 1  •  DULoxetine (CYMBALTA) 30 MG capsule, , Disp: , Rfl:   •  estradiol (ESTRACE) 1 MG tablet, Take 1 tablet by mouth Daily. Take 1/2 tablet to 1 tablet by mouth daily, Disp: 30 tablet, Rfl: 5  •  hydroxychloroquine (PLAQUENIL) 200 MG tablet, Take 1 tablet by mouth Daily., Disp: 30 tablet, Rfl: 2  •  mesalamine (LIALDA) 1.2 g EC tablet, TAKE 2 TABLETS BY MOUTH ONE TIME A DAY, Disp: , Rfl: 1  •  nabumetone (RELAFEN) 750 MG tablet, Take 750 mg by mouth., Disp: , Rfl:  "  •  SM Omeprazole 20 MG tablet delayed-release, , Disp: , Rfl:   •  triamterene-hydrochlorothiazide (MAXZIDE-25) 37.5-25 MG per tablet, Take 1 tablet by mouth Daily., Disp: 30 tablet, Rfl: 5    Allergies   Allergen Reactions   • Penicillins Unknown (See Comments)     'AS A BABY ALMOST PUT ME INTO A COMA'   • Morphine And Related Nausea And Vomiting   • Tetracyclines & Related Hives       Review of Systems   Constitutional: Negative for fever.   HENT: Negative for dental problem and voice change.    Eyes: Negative for visual disturbance.   Respiratory: Negative for shortness of breath.    Cardiovascular: Negative for chest pain.   Gastrointestinal: Negative for abdominal pain.   Genitourinary: Negative for dysuria.   Musculoskeletal: Positive for arthralgias, gait problem and joint swelling.   Skin: Negative for rash.   Neurological: Negative for speech difficulty.   Hematological: Does not bruise/bleed easily.   Psychiatric/Behavioral: Negative for confusion.       I have reviewed the medical and surgical history, family history, social history, medications, and/or allergies, and the review of systems of this report.    Objective   Resp 18   Ht 165.1 cm (65\")   Wt 90.7 kg (200 lb)   BMI 33.28 kg/m²    Physical Exam  Vitals signs and nursing note reviewed.   Constitutional:       General: She is not in acute distress.     Appearance: Normal appearance.   Pulmonary:      Effort: Pulmonary effort is normal.   Musculoskeletal:      Right knee: She exhibits no ecchymosis. No tenderness found. No medial joint line and no lateral joint line tenderness noted.      Right ankle: She exhibits decreased range of motion, swelling and ecchymosis. She exhibits normal pulse. Tenderness. Lateral malleolus and AITFL tenderness found. Achilles tendon exhibits no pain and no defect.   Neurological:      General: No focal deficit present.      Mental Status: She is alert and oriented to person, place, and time.   Psychiatric:        "  Mood and Affect: Mood normal.       Ortho Exam   Extremity DVT signs are negative on physical exam with negative Gina sign, no calf pain, no palpable cords and no skin tone change   Neurologic Exam     Mental Status   Oriented to person, place, and time.        Patient is tender to palpation along the anterior right ankle overlying the syndesmosis ligament      Assessment/Plan   Independent Review of Radiographic Studies:    I did review x-ray images from [facility] emergency room of the right ankle.  There is an acute fracture of the distal fibula with mild lateral subluxation of the distal fragment.  The ankle medial joint space measures 4.5 mm which raises concern for syndesmosis injury      Procedures       April was seen today for injury.    Diagnoses and all orders for this visit:    Ankle injury, right, initial encounter  -     MRI Ankle Right Without Contrast    Syndesmotic ankle sprain, right, initial encounter  -     MRI Ankle Right Without Contrast    Closed fracture of distal end of right fibula, unspecified fracture morphology, initial encounter  -     MRI Ankle Right Without Contrast       Risk, benefits, and merits of treatment alternatives reviewed with the patient and questions answered  Elevate leg for residual swelling  Reduced physical activity as appropriate  Weight bearing parameters reviewed  Avoid offending activity  Ice, heat, and/or modalities as beneficial    Recommendations/Plan:  Patient is encouraged to call or return for any issues or concerns.    Patient agreeable to call or return sooner for any concerns.    I will order MRI stat to rule out syndesmosis injury.  Patient is a potential candidate for right ankle surgery.  No weightbearing to the right lower extremity.  She was placed in a high tide pneumatic boot           EMR Dragon-transcription disclaimer:  This encounter note is an electronic transcription of spoken language to printed text.  Electronic transcription of  spoken language may permit erroneous or at times nonsensical words or phrases to be inadvertently transcribed.  Although I have reviewed the note for such errors, some may still exist   - Resolved

## 2020-09-18 ENCOUNTER — HOSPITAL ENCOUNTER (OUTPATIENT)
Dept: MRI IMAGING | Facility: HOSPITAL | Age: 45
Discharge: HOME | End: 2020-09-18
Admitting: PHYSICIAN ASSISTANT

## 2020-09-18 PROCEDURE — 73721 MRI JNT OF LWR EXTRE W/O DYE: CPT

## 2020-09-22 ENCOUNTER — OFFICE VISIT (OUTPATIENT)
Dept: ORTHOPEDIC SURGERY | Facility: CLINIC | Age: 45
End: 2020-09-22

## 2020-09-22 DIAGNOSIS — S82.831A CLOSED FRACTURE OF DISTAL END OF RIGHT FIBULA, UNSPECIFIED FRACTURE MORPHOLOGY, INITIAL ENCOUNTER: ICD-10-CM

## 2020-09-22 DIAGNOSIS — S82.391A CLOSED FRACTURE OF POSTERIOR MALLEOLUS OF RIGHT TIBIA, INITIAL ENCOUNTER: ICD-10-CM

## 2020-09-22 DIAGNOSIS — S99.911A ANKLE INJURY, RIGHT, INITIAL ENCOUNTER: Primary | ICD-10-CM

## 2020-09-22 NOTE — PROGRESS NOTES
Subjective   Vanna Nicolas is a 45 y.o. female     Chief Complaint   Patient presents with   • Right Ankle - Follow-up     MRI results   This visit has been rescheduled as a phone visit to comply with patient safety concerns in accordance with CDC recommendations. Total time of discussion was 5 minutes.    Patient presents via telephone to review MRI results of her right ankle injury.  Patient injured the right ankle [phone] [phone] when she fell down steps at her home  She was diagnosed with a distal fibula fracture.  She was seen in our office on 9/16/2020 had MRI of the right ankle ordered.  She has been nonweightbearing and using a high tide pneumatic boot  Past Medical History:   Diagnosis Date   • Anxiety and depression    • Body piercing     EARS BILATERAL   • Bursitis    • Chest pain     WITH EXCEDRINE TOO MUCH CAFFEINE   • Diarrhea    • Fibromyalgia    • GERD (gastroesophageal reflux disease)    • Hernia, umbilical    • High cholesterol    • Hip pain, acute, right    • Hypertension    • IBS (irritable bowel syndrome)    • Knee pain, bilateral    • Malignant melanoma (CMS/HCC)     stage 1, left leg   • Migraines    • OCD (obsessive compulsive disorder)    • PTSD (post-traumatic stress disorder)    • Sleep apnea     CPAP USAGE   • Wears glasses          Past Surgical History:   Procedure Laterality Date   • CARPAL TUNNEL RELEASE Right 1/10/2018    Procedure: ELECTIVE RIGHT CARPAL TUNNEL RELEASE;  Surgeon: Gus Ramos MD;  Location: [facility];  Service:    • CARPAL TUNNEL RELEASE Left 4/18/2018    Procedure: ELECTIVE LEFT CARPAL TUNNEL RELEASE;  Surgeon: Gus Ramos MD;  Location: [facility];  Service: Orthopedics   • CARPAL TUNNEL RELEASE     • COLONOSCOPY     • SKIN BIOPSY      STAGE 1 LEFT THIGH   • SKIN CANCER EXCISION     • TOTAL ABDOMINAL HYSTERECTOMY WITH SALPINGO OOPHORECTOMY  2004    ? Hormonal migraines       Allergies   Allergen Reactions   • Penicillins Unknown (See Comments)     'AS A BABY  ALMOST PUT ME INTO A COMA'   • Morphine And Related Nausea And Vomiting   • Tetracyclines & Related Hives       Objective   There were no vitals taken for this visit.   Physical Exam        Procedures    Assessment/Plan   PROCEDURE: MRI ANKLE RIGHT WO CONTRAST-     HISTORY: Fracture, ankle     FINDINGS: Multiplanar MR imaging of the right ankle was performed  without contrast. There is an oblique fracture of the distal fibular  metaphysis with mild overlapping of the fracture fragments and mild  adjacent edema. There is also a small impacted fracture along the  posterior medial aspect of the distal tibial articular surface with  adjacent bone marrow edema. There is sprain or a partial tear of the  deltoid ligament. The other ligaments appear intact. The flexor and  extensor tendons are intact. There is thickening of the posterior  plantar aponeurosis consistent with plantar fasciitis. There are small  tibiotalar and subtalar joint effusions. There is anterolateral  subcutaneous edema or hemorrhage.      IMPRESSION:  Fractures of the distal fibular metaphysis and posterior   medial distal tibia.     Sprain or partial tear of the deltoid ligament.     Posterior plantar fasciitis.     This report was finalized on 9/19/2020 6:07 AM by Terry Tierney MD.   Diagnosis Plan   1. Ankle injury, right, initial encounter     2. Closed fracture of distal end of right fibula, unspecified fracture morphology, initial encounter     3. Closed fracture of posterior malleolus of right tibia, initial encounter         Ice, heat, and/or modalities as beneficial    Recommendations:  Advised patient to remain nonweightbearing must use boot at all times.  We discussed the recommendation of right ankle ORIF.  Patient will need to come to the office for a formal preop evaluation    Patient agreeable to call or return sooner for any concerns.

## 2020-09-24 ENCOUNTER — APPOINTMENT (OUTPATIENT)
Dept: PREADMISSION TESTING | Facility: HOSPITAL | Age: 45
End: 2020-09-24

## 2020-09-24 ENCOUNTER — OFFICE VISIT (OUTPATIENT)
Dept: ORTHOPEDIC SURGERY | Facility: CLINIC | Age: 45
End: 2020-09-24

## 2020-09-24 ENCOUNTER — PREP FOR SURGERY (OUTPATIENT)
Dept: OTHER | Facility: HOSPITAL | Age: 45
End: 2020-09-24

## 2020-09-24 ENCOUNTER — HOSPITAL ENCOUNTER (OUTPATIENT)
Dept: GENERAL RADIOLOGY | Facility: HOSPITAL | Age: 45
Discharge: HOME | End: 2020-09-24

## 2020-09-24 VITALS — RESPIRATION RATE: 18 BRPM | BODY MASS INDEX: 35.65 KG/M2 | HEIGHT: 65 IN | WEIGHT: 214 LBS

## 2020-09-24 VITALS
BODY MASS INDEX: 35.75 KG/M2 | DIASTOLIC BLOOD PRESSURE: 78 MMHG | OXYGEN SATURATION: 97 % | WEIGHT: 214.6 LBS | SYSTOLIC BLOOD PRESSURE: 120 MMHG | HEART RATE: 82 BPM | HEIGHT: 65 IN

## 2020-09-24 DIAGNOSIS — S99.911A ANKLE INJURY, RIGHT, INITIAL ENCOUNTER: Primary | ICD-10-CM

## 2020-09-24 DIAGNOSIS — S82.831A CLOSED FRACTURE OF DISTAL END OF RIGHT FIBULA, UNSPECIFIED FRACTURE MORPHOLOGY, INITIAL ENCOUNTER: ICD-10-CM

## 2020-09-24 DIAGNOSIS — S82.391A CLOSED FRACTURE OF POSTERIOR MALLEOLUS OF RIGHT TIBIA, INITIAL ENCOUNTER: ICD-10-CM

## 2020-09-24 DIAGNOSIS — S99.911A ANKLE INJURY, RIGHT, INITIAL ENCOUNTER: ICD-10-CM

## 2020-09-24 LAB
ALBUMIN SERPL-MCNC: 4.5 G/DL (ref 3.5–5.2)
ALBUMIN/GLOB SERPL: 1.4 G/DL
ALP SERPL-CCNC: 121 U/L (ref 39–117)
ALT SERPL W P-5'-P-CCNC: 26 U/L (ref 1–33)
ANION GAP SERPL CALCULATED.3IONS-SCNC: 11.2 MMOL/L (ref 5–15)
AST SERPL-CCNC: 21 U/L (ref 1–32)
BASOPHILS # BLD AUTO: 0.08 10*3/MM3 (ref 0–0.2)
BASOPHILS NFR BLD AUTO: 0.8 % (ref 0–1.5)
BILIRUB SERPL-MCNC: 0.4 MG/DL (ref 0–1.2)
BILIRUB UR QL STRIP: NEGATIVE
BUN SERPL-MCNC: 23 MG/DL (ref 6–20)
BUN/CREAT SERPL: 26.1 (ref 7–25)
CALCIUM SPEC-SCNC: 9.9 MG/DL (ref 8.6–10.5)
CHLORIDE SERPL-SCNC: 100 MMOL/L (ref 98–107)
CLARITY UR: CLEAR
CO2 SERPL-SCNC: 27.8 MMOL/L (ref 22–29)
COLOR UR: YELLOW
CREAT SERPL-MCNC: 0.88 MG/DL (ref 0.57–1)
DEPRECATED RDW RBC AUTO: 41.4 FL (ref 37–54)
EOSINOPHIL # BLD AUTO: 0.26 10*3/MM3 (ref 0–0.4)
EOSINOPHIL NFR BLD AUTO: 2.5 % (ref 0.3–6.2)
ERYTHROCYTE [DISTWIDTH] IN BLOOD BY AUTOMATED COUNT: 12.3 % (ref 12.3–15.4)
GFR SERPL CREATININE-BSD FRML MDRD: 70 ML/MIN/1.73
GLOBULIN UR ELPH-MCNC: 3.2 GM/DL
GLUCOSE SERPL-MCNC: 110 MG/DL (ref 65–99)
GLUCOSE UR STRIP-MCNC: NEGATIVE MG/DL
HCT VFR BLD AUTO: 43.3 % (ref 34–46.6)
HGB BLD-MCNC: 14.8 G/DL (ref 12–15.9)
HGB UR QL STRIP.AUTO: NEGATIVE
IMM GRANULOCYTES # BLD AUTO: 0.06 10*3/MM3 (ref 0–0.05)
IMM GRANULOCYTES NFR BLD AUTO: 0.6 % (ref 0–0.5)
KETONES UR QL STRIP: NEGATIVE
LEUKOCYTE ESTERASE UR QL STRIP.AUTO: NEGATIVE
LYMPHOCYTES # BLD AUTO: 2.07 10*3/MM3 (ref 0.7–3.1)
LYMPHOCYTES NFR BLD AUTO: 19.8 % (ref 19.6–45.3)
MCH RBC QN AUTO: 31.6 PG (ref 26.6–33)
MCHC RBC AUTO-ENTMCNC: 34.2 G/DL (ref 31.5–35.7)
MCV RBC AUTO: 92.3 FL (ref 79–97)
MONOCYTES # BLD AUTO: 0.79 10*3/MM3 (ref 0.1–0.9)
MONOCYTES NFR BLD AUTO: 7.6 % (ref 5–12)
NEUTROPHILS NFR BLD AUTO: 68.7 % (ref 42.7–76)
NEUTROPHILS NFR BLD AUTO: 7.18 10*3/MM3 (ref 1.7–7)
NITRITE UR QL STRIP: NEGATIVE
NRBC BLD AUTO-RTO: 0 /100 WBC (ref 0–0.2)
PH UR STRIP.AUTO: <=5 [PH] (ref 5–8)
PLATELET # BLD AUTO: 258 10*3/MM3 (ref 140–450)
PMV BLD AUTO: 9.7 FL (ref 6–12)
POTASSIUM SERPL-SCNC: 4 MMOL/L (ref 3.5–5.2)
PROT SERPL-MCNC: 7.7 G/DL (ref 6–8.5)
PROT UR QL STRIP: NEGATIVE
RBC # BLD AUTO: 4.69 10*6/MM3 (ref 3.77–5.28)
SARS-COV-2 RNA PNL SPEC NAA+PROBE: NOT DETECTED
SODIUM SERPL-SCNC: 139 MMOL/L (ref 136–145)
SP GR UR STRIP: 1.03 (ref 1–1.03)
UROBILINOGEN UR QL STRIP: NORMAL
WBC # BLD AUTO: 10.44 10*3/MM3 (ref 3.4–10.8)

## 2020-09-24 PROCEDURE — 85025 COMPLETE CBC W/AUTO DIFF WBC: CPT | Performed by: PHYSICIAN ASSISTANT

## 2020-09-24 PROCEDURE — 93005 ELECTROCARDIOGRAM TRACING: CPT

## 2020-09-24 PROCEDURE — 71046 X-RAY EXAM CHEST 2 VIEWS: CPT

## 2020-09-24 PROCEDURE — 36415 COLL VENOUS BLD VENIPUNCTURE: CPT

## 2020-09-24 PROCEDURE — 80053 COMPREHEN METABOLIC PANEL: CPT | Performed by: PHYSICIAN ASSISTANT

## 2020-09-24 PROCEDURE — S0260 H&P FOR SURGERY: HCPCS | Performed by: PHYSICIAN ASSISTANT

## 2020-09-24 PROCEDURE — C9803 HOPD COVID-19 SPEC COLLECT: HCPCS

## 2020-09-24 PROCEDURE — 87081 CULTURE SCREEN ONLY: CPT | Performed by: PHYSICIAN ASSISTANT

## 2020-09-24 PROCEDURE — 87635 SARS-COV-2 COVID-19 AMP PRB: CPT | Performed by: PHYSICIAN ASSISTANT

## 2020-09-24 PROCEDURE — 81003 URINALYSIS AUTO W/O SCOPE: CPT | Performed by: PHYSICIAN ASSISTANT

## 2020-09-24 RX ORDER — CLINDAMYCIN PHOSPHATE 900 MG/50ML
900 INJECTION, SOLUTION INTRAVENOUS
Status: CANCELLED | OUTPATIENT
Start: 2020-09-25 | End: 2020-09-26

## 2020-09-24 NOTE — PROGRESS NOTES
Vanna Nicolas is a 45 y.o. female   Pre-op Exam of the Right Ankle (ORIF scheduled 9/25/2020)         CHIEF COMPLAINT:    Right ankle pain    History of Present Illness      Patient presents for a preop appointment for her right ankle distal fibula fracture.  Patient initially injured the right ankle 9/13/2020 when she fell down steps at her home.  She was diagnosed with a distal fibula fracture.  Patient did have an MRI of the right ankle to rule out syndesmosis injury.  She states she is still experiencing right ankle pain    PAST MEDICAL HISTORY:    Past Medical History:   Diagnosis Date   • Anxiety and depression    • Body piercing     EARS BILATERAL   • Bursitis    • Chest pain     WITH EXCEDRINE TOO MUCH CAFFEINE   • Diarrhea    • Fibromyalgia    • GERD (gastroesophageal reflux disease)    • Hernia, umbilical    • High cholesterol    • Hip pain, acute, right    • Hypertension    • IBS (irritable bowel syndrome)    • Knee pain, bilateral    • Malignant melanoma (CMS/HCC)     stage 1, left leg   • Migraines    • OCD (obsessive compulsive disorder)    • PTSD (post-traumatic stress disorder)    • Sleep apnea     CPAP USAGE   • Wears glasses          Current Outpatient Medications:   •  amLODIPine (NORVASC) 5 MG tablet, , Disp: , Rfl:   •  atorvastatin (LIPITOR) 20 MG tablet, , Disp: , Rfl:   •  divalproex (DEPAKOTE) 500 MG 24 hr tablet, Take 1 tablet by mouth Every Night., Disp: 90 tablet, Rfl: 1  •  DULoxetine (CYMBALTA) 30 MG capsule, , Disp: , Rfl:   •  estradiol (ESTRACE) 1 MG tablet, Take 1 tablet by mouth Daily. Take 1/2 tablet to 1 tablet by mouth daily, Disp: 30 tablet, Rfl: 5  •  hydroxychloroquine (PLAQUENIL) 200 MG tablet, Take 1 tablet by mouth Daily., Disp: 30 tablet, Rfl: 2  •  mesalamine (LIALDA) 1.2 g EC tablet, TAKE 2 TABLETS BY MOUTH ONE TIME A DAY, Disp: , Rfl: 1  •  nabumetone (RELAFEN) 750 MG tablet, Take 750 mg by mouth., Disp: , Rfl:   •  SM Omeprazole 20 MG tablet delayed-release, ,  Disp: , Rfl:   •  triamterene-hydrochlorothiazide (MAXZIDE-25) 37.5-25 MG per tablet, Take 1 tablet by mouth Daily., Disp: 30 tablet, Rfl: 5    Past Surgical History:   Procedure Laterality Date   • CARPAL TUNNEL RELEASE Right 1/10/2018    Procedure: ELECTIVE RIGHT CARPAL TUNNEL RELEASE;  Surgeon: Gus Ramos MD;  Location: [facility];  Service:    • CARPAL TUNNEL RELEASE Left 4/18/2018    Procedure: ELECTIVE LEFT CARPAL TUNNEL RELEASE;  Surgeon: Gus Ramos MD;  Location: [facility];  Service: Orthopedics   • CARPAL TUNNEL RELEASE     • COLONOSCOPY     • SKIN BIOPSY      STAGE 1 LEFT THIGH   • SKIN CANCER EXCISION     • TOTAL ABDOMINAL HYSTERECTOMY WITH SALPINGO OOPHORECTOMY  2004    ? Hormonal migraines       Family History   Problem Relation Age of Onset   • Rheum arthritis Mother    • Ulcerative colitis Mother    • COPD Father    • Crohn's disease Sister    • No Known Problems Maternal Aunt    • No Known Problems Maternal Uncle    • No Known Problems Paternal Aunt    • No Known Problems Paternal Uncle    • Kidney disease Maternal Grandmother    • Alzheimer's disease Maternal Grandfather    • No Known Problems Paternal Grandmother    • No Known Problems Paternal Grandfather    • Breast cancer Neg Hx    • Ovarian cancer Neg Hx        Social History     Socioeconomic History   • Marital status: Single     Spouse name: Not on file   • Number of children: Not on file   • Years of education: Not on file   • Highest education level: Not on file   Occupational History     Employer: UNEMPLOYED   Tobacco Use   • Smoking status: Current Every Day Smoker     Packs/day: 1.00     Years: 9.00     Pack years: 9.00   • Smokeless tobacco: Never Used   • Tobacco comment: vaping, quit smoking August 2020   Substance and Sexual Activity   • Alcohol use: No   • Drug use: No   • Sexual activity: Defer   Social History Narrative    Right hand dominant        Allergies   Allergen Reactions   • Penicillins Unknown (See Comments)     " 'AS A BABY ALMOST PUT ME INTO A COMA'   • Morphine And Related Nausea And Vomiting   • Tetracyclines & Related Hives       Review of Systems   Constitutional: Negative for diaphoresis, fever and unexpected weight change.   HENT: Negative for dental problem and sore throat.    Eyes: Negative for visual disturbance.   Respiratory: Negative for shortness of breath.    Cardiovascular: Negative for chest pain.   Gastrointestinal: Negative for abdominal pain, constipation, diarrhea, nausea and vomiting.   Genitourinary: Negative for difficulty urinating and frequency.   Musculoskeletal: Positive for arthralgias (right ankle).   Neurological: Negative for headaches.   Hematological: Does not bruise/bleed easily.       I have reviewed the medical and surgical history, family history, social history, medications, and/or allergies, and the review of systems of this report.    PHYSICAL EXAMINATION:       Resp 18   Ht 165.1 cm (65\")   Wt 97.1 kg (214 lb)   BMI 35.61 kg/m²     GENERAL [x] Well developed  []Ill appearing [x] No acute distress    HEENT [x]No acute changes [x] Normocephalic, atraumatic    NECK [x]Supple  [] No midline tenderness    LUNGS [x]Clear bilaterally [x]No wheezes []Rhonchi [] Rales    HEART [x] Regular rate  [x] Regular rhythm [] Irregular    ABDOMEN [x] Soft [x] Not tender [x] Not distended [x] Normal sounds    VAS/EXT [x] Normal Pulses []Edema []Cyanosis             SKIN [x] Warm [x]Dry []Pink []Ecchymosis []Cool    NEURO [x] Sensation Intact [x] Motor Intact [x] Pulse intact  [] Dysesthesias:_________  []Weakness:__________             Physical Exam  Vitals signs and nursing note reviewed.   Musculoskeletal:      Right knee: No tenderness found. No medial joint line and no lateral joint line tenderness noted.      Right ankle: She exhibits no swelling and no ecchymosis. Tenderness. Lateral malleolus and medial malleolus tenderness found. Achilles tendon exhibits no pain and no defect.   Skin:     " Capillary Refill: Capillary refill takes less than 2 seconds.   Neurological:      General: No focal deficit present.      Mental Status: She is alert and oriented to person, place, and time.   Psychiatric:         Mood and Affect: Mood normal.       Right Ankle Exam     Tenderness   The patient is experiencing tenderness in the deltoid.    Other   Erythema: absent            Neurologic Exam     Mental Status   Oriented to person, place, and time.     Please see the physical exam from prior office visit         Independent Review of Radiographic Studies:    PROCEDURE: MRI ANKLE RIGHT WO CONTRAST-     HISTORY: Fracture, ankle     FINDINGS: Multiplanar MR imaging of the right ankle was performed  without contrast. There is an oblique fracture of the distal fibular  metaphysis with mild overlapping of the fracture fragments and mild  adjacent edema. There is also a small impacted fracture along the  posterior medial aspect of the distal tibial articular surface with  adjacent bone marrow edema. There is sprain or a partial tear of the  deltoid ligament. The other ligaments appear intact. The flexor and  extensor tendons are intact. There is thickening of the posterior  plantar aponeurosis consistent with plantar fasciitis. There are small  tibiotalar and subtalar joint effusions. There is anterolateral  subcutaneous edema or hemorrhage.      IMPRESSION:  Fractures of the distal fibular metaphysis and posterior  medial distal tibia.     Sprain or partial tear of the deltoid ligament.     Posterior plantar fasciitis.     This report was finalized on 9/19/2020 6:07 AM by Terry Tierney MD.        April was seen today for pre-op exam.    Diagnoses and all orders for this visit:    Ankle injury, right, initial encounter    Closed fracture of distal end of right fibula, unspecified fracture morphology, initial encounter    Closed fracture of posterior malleolus of right tibia, initial encounter        *SPECIAL INSTRUCTIONS:   Multi-modal analgesia.  Multi-modal DVT prophylaxis.  Rehabilitation PT/OT planned.    Assessment/Plan:  The nature of the proposed surgery reviewed with the patient including risks, benefits, rehabilitation, recovery timeframe, and outcome expectations  Ice, heat, and/or modalities as beneficial  Weight bearing parameters reviewed          Risks, benefits, and alternative treatments discussed with the patient: [x] Yes [] No    Risk benefits and merits of the proposed surgery were discussed and the patient's questions were answered risks discussed including and not limited to:  Anesthesia reactions  Infection  Deep venous thrombosis and pulmonary embolus  Nerve, vascular or tendon injury  Fracture  Deformity  Stiffness  Weakness  Prosthesis and implant issues such as loosening, material wear or dislocation  Skin necrosis  Revision surgery or further treatment  Recurrence of problem and condition     Informed consent: [] Signed  [x] To be obtained at hospital  [] Both    Recommendations/Plan:    Exercise, medications, injections, other patient advice, and return appointment as noted.  Patient to continue using the pneumatic boot    PLANNED SURGICAL PROCEDURE: Right ankle ORIF Distal fibula    Patient is encouraged and agreeable to call or return sooner for any issues or concerns.

## 2020-09-25 ENCOUNTER — ANESTHESIA (OUTPATIENT)
Dept: PERIOP | Facility: HOSPITAL | Age: 45
End: 2020-09-25

## 2020-09-25 ENCOUNTER — APPOINTMENT (OUTPATIENT)
Dept: ULTRASOUND IMAGING | Facility: HOSPITAL | Age: 45
End: 2020-09-25

## 2020-09-25 ENCOUNTER — ANESTHESIA EVENT (OUTPATIENT)
Dept: PERIOP | Facility: HOSPITAL | Age: 45
End: 2020-09-25

## 2020-09-25 ENCOUNTER — HOSPITAL ENCOUNTER (OUTPATIENT)
Facility: HOSPITAL | Age: 45
Setting detail: HOSPITAL OUTPATIENT SURGERY
Discharge: HOME | End: 2020-09-25
Attending: ORTHOPAEDIC SURGERY | Admitting: ORTHOPAEDIC SURGERY

## 2020-09-25 ENCOUNTER — APPOINTMENT (OUTPATIENT)
Dept: GENERAL RADIOLOGY | Facility: HOSPITAL | Age: 45
End: 2020-09-25

## 2020-09-25 VITALS
TEMPERATURE: 97.4 F | HEART RATE: 78 BPM | SYSTOLIC BLOOD PRESSURE: 110 MMHG | DIASTOLIC BLOOD PRESSURE: 61 MMHG | OXYGEN SATURATION: 99 % | RESPIRATION RATE: 16 BRPM

## 2020-09-25 DIAGNOSIS — S82.831A CLOSED FRACTURE OF DISTAL END OF RIGHT FIBULA, UNSPECIFIED FRACTURE MORPHOLOGY, INITIAL ENCOUNTER: ICD-10-CM

## 2020-09-25 DIAGNOSIS — S99.911A ANKLE INJURY, RIGHT, INITIAL ENCOUNTER: ICD-10-CM

## 2020-09-25 DIAGNOSIS — S82.831A CLOSED FRACTURE OF DISTAL END OF RIGHT FIBULA, UNSPECIFIED FRACTURE MORPHOLOGY, INITIAL ENCOUNTER: Primary | ICD-10-CM

## 2020-09-25 LAB — MRSA SPEC QL CULT: NORMAL

## 2020-09-25 PROCEDURE — 25010000002 DEXAMETHASONE PER 1 MG: Performed by: NURSE ANESTHETIST, CERTIFIED REGISTERED

## 2020-09-25 PROCEDURE — 25010000002 FENTANYL CITRATE (PF) 100 MCG/2ML SOLUTION: Performed by: NURSE ANESTHETIST, CERTIFIED REGISTERED

## 2020-09-25 PROCEDURE — 25010000002 MIDAZOLAM PER 1MG: Performed by: NURSE ANESTHETIST, CERTIFIED REGISTERED

## 2020-09-25 PROCEDURE — C1713 ANCHOR/SCREW BN/BN,TIS/BN: HCPCS | Performed by: ORTHOPAEDIC SURGERY

## 2020-09-25 PROCEDURE — 25010000002 HYDROMORPHONE PER 4 MG: Performed by: NURSE ANESTHETIST, CERTIFIED REGISTERED

## 2020-09-25 PROCEDURE — 25010000002 ONDANSETRON PER 1 MG: Performed by: NURSE ANESTHETIST, CERTIFIED REGISTERED

## 2020-09-25 PROCEDURE — 25010000002 HYDROMORPHONE 1 MG/ML SOLUTION

## 2020-09-25 PROCEDURE — 27792 TREATMENT OF ANKLE FRACTURE: CPT | Performed by: ORTHOPAEDIC SURGERY

## 2020-09-25 PROCEDURE — 76000 FLUOROSCOPY <1 HR PHYS/QHP: CPT

## 2020-09-25 PROCEDURE — 25010000002 PROPOFOL 200 MG/20ML EMULSION: Performed by: NURSE ANESTHETIST, CERTIFIED REGISTERED

## 2020-09-25 DEVICE — PLT TBG 1/3 LCP W COL 7HL 81MM: Type: IMPLANTABLE DEVICE | Site: ANKLE | Status: FUNCTIONAL

## 2020-09-25 DEVICE — SCRW LK S/TAP STRDRV 3.5X12MM: Type: IMPLANTABLE DEVICE | Site: ANKLE | Status: FUNCTIONAL

## 2020-09-25 DEVICE — SCRW LK S/TAP STRDRV 3.5X14MM: Type: IMPLANTABLE DEVICE | Site: ANKLE | Status: FUNCTIONAL

## 2020-09-25 DEVICE — SCRW CORT S/TAP 3.5X14MM: Type: IMPLANTABLE DEVICE | Site: ANKLE | Status: FUNCTIONAL

## 2020-09-25 DEVICE — SCRW CORT S/TAP 3.5X12MM: Type: IMPLANTABLE DEVICE | Site: ANKLE | Status: FUNCTIONAL

## 2020-09-25 DEVICE — SCRW LK S/TAP STRDRV 3.5X18MM: Type: IMPLANTABLE DEVICE | Site: ANKLE | Status: FUNCTIONAL

## 2020-09-25 RX ORDER — DEXAMETHASONE SODIUM PHOSPHATE 4 MG/ML
INJECTION, SOLUTION INTRA-ARTICULAR; INTRALESIONAL; INTRAMUSCULAR; INTRAVENOUS; SOFT TISSUE AS NEEDED
Status: DISCONTINUED | OUTPATIENT
Start: 2020-09-25 | End: 2020-09-25 | Stop reason: SURG

## 2020-09-25 RX ORDER — ONDANSETRON 2 MG/ML
4 INJECTION INTRAMUSCULAR; INTRAVENOUS ONCE AS NEEDED
Status: DISCONTINUED | OUTPATIENT
Start: 2020-09-25 | End: 2020-09-25 | Stop reason: HOSPADM

## 2020-09-25 RX ORDER — HYDROMORPHONE HCL 110MG/55ML
PATIENT CONTROLLED ANALGESIA SYRINGE INTRAVENOUS AS NEEDED
Status: DISCONTINUED | OUTPATIENT
Start: 2020-09-25 | End: 2020-09-25 | Stop reason: SURG

## 2020-09-25 RX ORDER — CLINDAMYCIN PHOSPHATE 900 MG/50ML
900 INJECTION, SOLUTION INTRAVENOUS ONCE
Status: COMPLETED | OUTPATIENT
Start: 2020-09-25 | End: 2020-09-25

## 2020-09-25 RX ORDER — KETAMINE HCL IN NACL, ISO-OSM 100MG/10ML
SYRINGE (ML) INJECTION AS NEEDED
Status: DISCONTINUED | OUTPATIENT
Start: 2020-09-25 | End: 2020-09-25 | Stop reason: SURG

## 2020-09-25 RX ORDER — MEPERIDINE HYDROCHLORIDE 25 MG/ML
50 INJECTION INTRAMUSCULAR; INTRAVENOUS; SUBCUTANEOUS ONCE AS NEEDED
Status: DISCONTINUED | OUTPATIENT
Start: 2020-09-25 | End: 2020-09-25 | Stop reason: HOSPADM

## 2020-09-25 RX ORDER — ONDANSETRON 2 MG/ML
INJECTION INTRAMUSCULAR; INTRAVENOUS AS NEEDED
Status: DISCONTINUED | OUTPATIENT
Start: 2020-09-25 | End: 2020-09-25 | Stop reason: SURG

## 2020-09-25 RX ORDER — SODIUM CHLORIDE, SODIUM LACTATE, POTASSIUM CHLORIDE, CALCIUM CHLORIDE 600; 310; 30; 20 MG/100ML; MG/100ML; MG/100ML; MG/100ML
1000 INJECTION, SOLUTION INTRAVENOUS CONTINUOUS
Status: DISCONTINUED | OUTPATIENT
Start: 2020-09-25 | End: 2020-09-25 | Stop reason: HOSPADM

## 2020-09-25 RX ORDER — HYDROCODONE BITARTRATE AND ACETAMINOPHEN 7.5; 325 MG/1; MG/1
1 TABLET ORAL EVERY 8 HOURS PRN
Qty: 21 TABLET | Refills: 0 | Status: SHIPPED | OUTPATIENT
Start: 2020-09-25 | End: 2020-10-07

## 2020-09-25 RX ORDER — PROPOFOL 10 MG/ML
INJECTION, EMULSION INTRAVENOUS AS NEEDED
Status: DISCONTINUED | OUTPATIENT
Start: 2020-09-25 | End: 2020-09-25 | Stop reason: SURG

## 2020-09-25 RX ORDER — BUPIVACAINE HYDROCHLORIDE 5 MG/ML
INJECTION, SOLUTION EPIDURAL; INTRACAUDAL AS NEEDED
Status: DISCONTINUED | OUTPATIENT
Start: 2020-09-25 | End: 2020-09-25 | Stop reason: HOSPADM

## 2020-09-25 RX ORDER — FENTANYL CITRATE 50 UG/ML
INJECTION, SOLUTION INTRAMUSCULAR; INTRAVENOUS AS NEEDED
Status: DISCONTINUED | OUTPATIENT
Start: 2020-09-25 | End: 2020-09-25 | Stop reason: SURG

## 2020-09-25 RX ORDER — MAGNESIUM HYDROXIDE 1200 MG/15ML
LIQUID ORAL AS NEEDED
Status: DISCONTINUED | OUTPATIENT
Start: 2020-09-25 | End: 2020-09-25 | Stop reason: HOSPADM

## 2020-09-25 RX ORDER — LIDOCAINE HYDROCHLORIDE 20 MG/ML
INJECTION, SOLUTION INTRAVENOUS AS NEEDED
Status: DISCONTINUED | OUTPATIENT
Start: 2020-09-25 | End: 2020-09-25 | Stop reason: SURG

## 2020-09-25 RX ORDER — MIDAZOLAM HYDROCHLORIDE 2 MG/2ML
INJECTION, SOLUTION INTRAMUSCULAR; INTRAVENOUS AS NEEDED
Status: DISCONTINUED | OUTPATIENT
Start: 2020-09-25 | End: 2020-09-25 | Stop reason: SURG

## 2020-09-25 RX ORDER — SODIUM CHLORIDE 0.9 % (FLUSH) 0.9 %
10 SYRINGE (ML) INJECTION AS NEEDED
Status: DISCONTINUED | OUTPATIENT
Start: 2020-09-25 | End: 2020-09-25 | Stop reason: HOSPADM

## 2020-09-25 RX ORDER — CLINDAMYCIN PHOSPHATE 150 MG/ML
INJECTION, SOLUTION INTRAVENOUS AS NEEDED
Status: DISCONTINUED | OUTPATIENT
Start: 2020-09-25 | End: 2020-09-25 | Stop reason: HOSPADM

## 2020-09-25 RX ADMIN — HYDROMORPHONE HYDROCHLORIDE 0.5 MG: 1 INJECTION, SOLUTION INTRAMUSCULAR; INTRAVENOUS; SUBCUTANEOUS at 16:53

## 2020-09-25 RX ADMIN — FENTANYL CITRATE 50 MCG: 50 INJECTION INTRAMUSCULAR; INTRAVENOUS at 14:31

## 2020-09-25 RX ADMIN — HYDROMORPHONE HYDROCHLORIDE 0.5 MG: 2 INJECTION, SOLUTION INTRAMUSCULAR; INTRAVENOUS; SUBCUTANEOUS at 14:50

## 2020-09-25 RX ADMIN — HYDROMORPHONE HYDROCHLORIDE 0.5 MG: 2 INJECTION, SOLUTION INTRAMUSCULAR; INTRAVENOUS; SUBCUTANEOUS at 15:28

## 2020-09-25 RX ADMIN — PROPOFOL 50 MG: 10 INJECTION, EMULSION INTRAVENOUS at 14:36

## 2020-09-25 RX ADMIN — Medication 0.5 MG: at 16:53

## 2020-09-25 RX ADMIN — HYDROMORPHONE HYDROCHLORIDE 0.5 MG: 2 INJECTION, SOLUTION INTRAMUSCULAR; INTRAVENOUS; SUBCUTANEOUS at 15:08

## 2020-09-25 RX ADMIN — SODIUM CHLORIDE, POTASSIUM CHLORIDE, SODIUM LACTATE AND CALCIUM CHLORIDE 1000 ML: 600; 310; 30; 20 INJECTION, SOLUTION INTRAVENOUS at 13:10

## 2020-09-25 RX ADMIN — HYDROMORPHONE HYDROCHLORIDE 0.5 MG: 1 INJECTION, SOLUTION INTRAMUSCULAR; INTRAVENOUS; SUBCUTANEOUS at 17:23

## 2020-09-25 RX ADMIN — Medication 25 MG: at 14:37

## 2020-09-25 RX ADMIN — PROPOFOL 150 MG: 10 INJECTION, EMULSION INTRAVENOUS at 14:35

## 2020-09-25 RX ADMIN — CLINDAMYCIN PHOSPHATE 900 MG: 900 INJECTION, SOLUTION INTRAVENOUS at 14:30

## 2020-09-25 RX ADMIN — HYDROMORPHONE HYDROCHLORIDE 0.5 MG: 2 INJECTION, SOLUTION INTRAMUSCULAR; INTRAVENOUS; SUBCUTANEOUS at 15:03

## 2020-09-25 RX ADMIN — SODIUM CHLORIDE, POTASSIUM CHLORIDE, SODIUM LACTATE AND CALCIUM CHLORIDE: 600; 310; 30; 20 INJECTION, SOLUTION INTRAVENOUS at 15:08

## 2020-09-25 RX ADMIN — LIDOCAINE HYDROCHLORIDE 40 MG: 20 INJECTION, SOLUTION INTRAVENOUS at 14:34

## 2020-09-25 RX ADMIN — FENTANYL CITRATE 50 MCG: 50 INJECTION INTRAMUSCULAR; INTRAVENOUS at 14:35

## 2020-09-25 RX ADMIN — FAMOTIDINE 20 MG: 10 INJECTION INTRAVENOUS at 13:13

## 2020-09-25 RX ADMIN — DEXAMETHASONE SODIUM PHOSPHATE 8 MG: 4 INJECTION, SOLUTION INTRAMUSCULAR; INTRAVENOUS at 14:50

## 2020-09-25 RX ADMIN — MIDAZOLAM HYDROCHLORIDE 2 MG: 1 INJECTION, SOLUTION INTRAMUSCULAR; INTRAVENOUS at 14:30

## 2020-09-25 RX ADMIN — Medication 0.5 MG: at 17:23

## 2020-09-25 RX ADMIN — ONDANSETRON 4 MG: 2 INJECTION INTRAMUSCULAR; INTRAVENOUS at 14:50

## 2020-09-25 NOTE — ANESTHESIA PROCEDURE NOTES
Airway  Urgency: elective    Date/Time: 9/25/2020 2:36 PM    General Information and Staff    Patient location during procedure: OR  CRNA: Yefri Fernandez CRNA    Indications and Patient Condition  Indications: management.    Preoxygenated: yes      Final Airway Details  Final airway type: supraglottic airway      Successful airway: classic  Size 4    Number of attempts at approach: 1  Assessment: lips, teeth, and gum same as pre-op and atraumatic intubation    Additional Comments  Lma placed by standard fashion, cuff up with mov, bbs and expansion =, + etco, tolerated without adverse rx. Syringe to  balloon for cuff pressure equalization, gauge is in the green zone.

## 2020-09-25 NOTE — ANESTHESIA PREPROCEDURE EVALUATION
Anesthesia Evaluation     Patient summary reviewed and Nursing notes reviewed   history of anesthetic complications: difficult airway  NPO Solid Status: > 8 hours  NPO Liquid Status: > 8 hours           Airway   Mallampati: IV  TM distance: >3 FB  Neck ROM: full  Small opening, Large neck circumference and Difficult intubation highly probable  Dental    (+) poor dentation    Pulmonary - normal exam    breath sounds clear to auscultation  (+) a smoker Current Abstained day of surgery, shortness of breath, sleep apnea,   Cardiovascular - normal exam    Rhythm: regular  Rate: normal    (+) hypertension, YEUNG, PVD, hyperlipidemia,   CAD:  inc risk.      Neuro/Psych  (+) headaches, numbness, psychiatric history Depression and Anxiety,     GI/Hepatic/Renal/Endo    (+) obesity,  GERD,    Diabetes:  inc risk.    Musculoskeletal     (+) arthralgias, back pain, chronic pain, myalgias,   Abdominal   (+) obese,    Substance History - negative use     OB/GYN negative ob/gyn ROS         Other   arthritis,    history of cancer    ROS/Med Hx Other: PTSD  Anesthesia record from previous [facility] reviewed and no changes.    Labs reviewed  cxr nad  ekg sr  Echo ef 70%                  Anesthesia Plan    ASA 3     general with block   (Risks and benefits discussed including risk of aspiration, recall and dental damage. All patient questions answered.    Will continue with plan of care.)  intravenous induction     Anesthetic plan, all risks, benefits, and alternatives have been provided, discussed and informed consent has been obtained with: patient.

## 2020-09-25 NOTE — ANESTHESIA POSTPROCEDURE EVALUATION
Patient: Vanna Nicolas    Procedure Summary     Date: 09/25/20 Room / Location: [facility] OR  /  [facility]    Anesthesia Start: 1430 Anesthesia Stop:     Procedure: Right distal fibula open reduction internal fixation (Right Ankle) Diagnosis:       Ankle injury, right, initial encounter      Closed fracture of distal end of right fibula, unspecified fracture morphology, initial encounter      (Ankle injury, right, initial encounter [S99.911A])      (Closed fracture of distal end of right fibula, unspecified fracture morphology, initial encounter [S82.831A])    Surgeon: Danny Fuller MD Provider: Yefri Fernandez CRNA    Anesthesia Type: general with block ASA Status: 3          Anesthesia Type: general with block    Vitals  Vitals Value Taken Time   BP     Temp     Pulse     Resp     SpO2 98 % 09/25/20 1643   Vitals shown include unvalidated device data.        Post Anesthesia Care and Evaluation    Patient location during evaluation: PHASE II  Patient participation: complete - patient participated  Level of consciousness: awake  Pain score: 0  Pain management: adequate  Airway patency: patent  Anesthetic complications: No anesthetic complications  PONV Status: none  Cardiovascular status: acceptable  Respiratory status: acceptable and face mask  Hydration status: acceptable    Comments: vsss resp spont, reflexes intact, responsive, report given to pacu nurse

## 2020-09-29 RX ORDER — CLINDAMYCIN HYDROCHLORIDE 300 MG/1
300 CAPSULE ORAL 3 TIMES DAILY
Qty: 30 CAPSULE | Refills: 0 | Status: SHIPPED | OUTPATIENT
Start: 2020-09-29 | End: 2020-10-21

## 2020-09-29 NOTE — PROGRESS NOTES
Patient called stating she was noticing scant yellow/green drainage today from her ankle incision.  We will see the patient first thing in the morning.  We will call in clindamycin for her to start on tonight.

## 2020-09-30 ENCOUNTER — OFFICE VISIT (OUTPATIENT)
Dept: ORTHOPEDIC SURGERY | Facility: CLINIC | Age: 45
End: 2020-09-30

## 2020-09-30 VITALS — WEIGHT: 214 LBS | HEIGHT: 65 IN | BODY MASS INDEX: 35.65 KG/M2 | RESPIRATION RATE: 18 BRPM | TEMPERATURE: 97.1 F

## 2020-09-30 DIAGNOSIS — Z48.89 ENCOUNTER FOR POST SURGICAL WOUND CHECK: Primary | ICD-10-CM

## 2020-09-30 PROCEDURE — 99024 POSTOP FOLLOW-UP VISIT: CPT | Performed by: PHYSICIAN ASSISTANT

## 2020-09-30 NOTE — PROGRESS NOTES
Subjective   Patient ID: Vanna Nicolas is a 45 y.o.  female is here today for a post-operative visit.  Post-op and Wound Check of the Right Ankle          CHIEF COMPLAINT:      History of Present Illness      Pain controlled: [] no   [x] yes   Medication refill requested: [x] no   [] yes    Patient compliant with instructions: [] no   [x] yes    Patient presents with a concern of mild yellow drainage from the right ankle incision.  She is status post right ORIF.  She denies fever chills     Past Medical History:   Diagnosis Date   • Anxiety and depression    • Arthritis    • Body piercing     EARS BILATERAL   • Bursitis    • Chest pain unsure of year    WITH EXCEDRINE TOO MUCH CAFFEINE.  LAST EPISODE WAS 2/2020.  PT WENT TO E.R.  STATED F/U WITH DR LEAL AND WAS TOLD THAT IT WAS DUE TO ACID REFLUX.    • Diarrhea    • Elevated cholesterol    • Fibromyalgia    • GERD (gastroesophageal reflux disease)    • Hernia, umbilical    • High cholesterol    • Hip pain, acute, right    • History of nuclear stress test 2014   • Hypertension    • IBS (irritable bowel syndrome)    • Knee pain, bilateral    • Malignant melanoma (CMS/HCC)     stage 1, left leg   • Migraines    • OCD (obsessive compulsive disorder)    • PTSD (post-traumatic stress disorder)    • Sleep apnea     CPAP USAGE   • Ulcerative colitis (CMS/HCC)    • Wears glasses         Past Surgical History:   Procedure Laterality Date   • ANKLE OPEN REDUCTION INTERNAL FIXATION Right 9/25/2020    Procedure: Right distal fibula open reduction internal fixation;  Surgeon: Danny Fuller MD;  Location: [facility];  Service: Orthopedics;  Laterality: Right;   • CARPAL TUNNEL RELEASE Right 1/10/2018    Procedure: ELECTIVE RIGHT CARPAL TUNNEL RELEASE;  Surgeon: Gus Ramos MD;  Location: [facility] OR;  Service:    • CARPAL TUNNEL RELEASE Left 4/18/2018    Procedure: ELECTIVE LEFT CARPAL TUNNEL RELEASE;  Surgeon: Gus Ramos MD;  Location: [facility] OR;  Service:  "Orthopedics   • COLONOSCOPY     • ENDOSCOPY     • LAPAROSCOPIC TUBAL LIGATION     • SKIN BIOPSY      STAGE 1 LEFT THIGH   • SKIN CANCER EXCISION     • TOTAL ABDOMINAL HYSTERECTOMY WITH SALPINGO OOPHORECTOMY  2004    ? Hormonal migraines   • WISDOM TOOTH EXTRACTION         Allergies   Allergen Reactions   • Penicillins Unknown (See Comments)     'AS A BABY ALMOST PUT ME INTO A COMA'   • Morphine And Related Nausea And Vomiting   • Tetracyclines & Related Hives       Review of Systems   Constitutional: Negative for fever.   HENT: Negative for dental problem and voice change.    Eyes: Negative for visual disturbance.   Respiratory: Negative for shortness of breath.    Cardiovascular: Negative for chest pain.   Gastrointestinal: Negative for abdominal pain.   Genitourinary: Negative for dysuria.   Musculoskeletal: Positive for arthralgias. Negative for gait problem and joint swelling.   Skin: Negative for rash.   Neurological: Negative for speech difficulty.   Hematological: Does not bruise/bleed easily.   Psychiatric/Behavioral: Negative for confusion.     I have reviewed the medical and surgical history, family history, social history, medications, and/or allergies, and the review of systems of this report.    Objective   Temp 97.1 °F (36.2 °C)   Resp 18   Ht 165.1 cm (65\")   Wt 97.1 kg (214 lb)   BMI 35.61 kg/m²       Signs of infection: [x] no                    [] yes   Drainage: [x] no                    [] yes   Incision: [x] healing well     []healed well   Motor exam intact: [] no                    [x] yes   Neurovascular exam intact: [] no                    [x] yes   Signs of compartment syndrome: [x] no                    [] yes   Signs of DVT: [x] no                    [] yes   Other:  There is no active drainage to the right ankle.  She did have a Vaseline gauze overlying the incision which did have scant dried blood.  I think the blood plus serous drainage may have caused the odd color yellow " drainage that she noticed     Physical Exam  Ortho Exam    Extremity DVT signs are negative on physical exam with negative Gina sign, no calf pain, no palpable cords and no skin tone change  Neurologic Exam    Assessment/Plan     Independent Review of Radiographic Studies:    No new imaging done today.    Laboratory and Other Studies:  No new results reviewed today.     Medical Decision Making:    Stable neurovascular exam.     Procedures     April was seen today for post-op and wound check.    Diagnoses and all orders for this visit:    Encounter for post surgical wound check         Recommendations/Plan:     Sutures Staples or Pins [] Removed today  [] At prior visit  [x] Plan removal later   Physical therapy: [x]rehab facility  []outpatient referral  [] therapy ongoing   Ultrasound: [x]not ordered         []order given to patient   Labs: [x]not ordered         []order given to patient   Weight Bearing status: []Full []WBAT []PWB [x]NWB []Other     Patient did notify the office yesterday of her concern and we immediately started her on oral antibiotics as a precautionary measure  Patient may continue oral antibiotics.    Patient is encouraged and agreeable to call or return sooner for any issues or concerns.

## 2020-10-07 ENCOUNTER — OFFICE VISIT (OUTPATIENT)
Dept: ORTHOPEDIC SURGERY | Facility: CLINIC | Age: 45
End: 2020-10-07

## 2020-10-07 DIAGNOSIS — S82.831A CLOSED FRACTURE OF DISTAL END OF RIGHT FIBULA, UNSPECIFIED FRACTURE MORPHOLOGY, INITIAL ENCOUNTER: Primary | ICD-10-CM

## 2020-10-07 PROCEDURE — 99024 POSTOP FOLLOW-UP VISIT: CPT | Performed by: PHYSICIAN ASSISTANT

## 2020-10-07 NOTE — PROGRESS NOTES
Subjective   Patient ID: April Judy Nicolas is a 45 y.o. right hand dominant female is here today for a post-operative visit.  Post-op of the Right Ankle (S/P right ORIF 9/25/20.) and Suture / Staple Removal          CHIEF COMPLAINT:      History of Present Illness      Pain controlled: [] no   [x] yes   Medication refill requested: [x] no   [] yes    Patient compliant with instructions: [] no   [x] yes   Other: Reports good progress since surgery.  Patient denies calf pain.  Of note patient was seen last week for concern of infection to the incision site.  She was placed on oral antibiotics which she states she has tolerated well.  There has been no active drainage from incision.     Past Medical History:   Diagnosis Date   • Anxiety and depression    • Arthritis    • Body piercing     EARS BILATERAL   • Bursitis    • Chest pain unsure of year    WITH EXCEDRINE TOO MUCH CAFFEINE.  LAST EPISODE WAS 2/2020.  PT WENT TO E.R.  STATED F/U WITH DR LEAL AND WAS TOLD THAT IT WAS DUE TO ACID REFLUX.    • Diarrhea    • Elevated cholesterol    • Fibromyalgia    • GERD (gastroesophageal reflux disease)    • Hernia, umbilical    • High cholesterol    • Hip pain, acute, right    • History of nuclear stress test 2014   • Hypertension    • IBS (irritable bowel syndrome)    • Knee pain, bilateral    • Malignant melanoma (CMS/HCC)     stage 1, left leg   • Migraines    • OCD (obsessive compulsive disorder)    • PTSD (post-traumatic stress disorder)    • Sleep apnea     CPAP USAGE   • Ulcerative colitis (CMS/HCC)    • Wears glasses         Past Surgical History:   Procedure Laterality Date   • ANKLE OPEN REDUCTION INTERNAL FIXATION Right 9/25/2020    Procedure: Right distal fibula open reduction internal fixation;  Surgeon: Danny Fuller MD;  Location: [facility];  Service: Orthopedics;  Laterality: Right;   • CARPAL TUNNEL RELEASE Right 1/10/2018    Procedure: ELECTIVE RIGHT CARPAL TUNNEL RELEASE;  Surgeon: Gus Ramos,  MD;  Location: [facility];  Service:    • CARPAL TUNNEL RELEASE Left 4/18/2018    Procedure: ELECTIVE LEFT CARPAL TUNNEL RELEASE;  Surgeon: Gus Ramos MD;  Location: [facility];  Service: Orthopedics   • COLONOSCOPY     • ENDOSCOPY     • LAPAROSCOPIC TUBAL LIGATION     • SKIN BIOPSY      STAGE 1 LEFT THIGH   • SKIN CANCER EXCISION     • TOTAL ABDOMINAL HYSTERECTOMY WITH SALPINGO OOPHORECTOMY  2004    ? Hormonal migraines   • WISDOM TOOTH EXTRACTION         Allergies   Allergen Reactions   • Penicillins Unknown (See Comments)     'AS A BABY ALMOST PUT ME INTO A COMA'   • Morphine And Related Nausea And Vomiting   • Tetracyclines & Related Hives       Review of Systems   Constitutional: Negative for diaphoresis, fever and unexpected weight change.   HENT: Negative for dental problem and sore throat.    Eyes: Negative for visual disturbance.   Respiratory: Negative for shortness of breath.    Cardiovascular: Negative for chest pain.   Gastrointestinal: Negative for abdominal pain, constipation, diarrhea, nausea and vomiting.   Genitourinary: Negative for difficulty urinating and frequency.   Musculoskeletal: Positive for arthralgias and joint swelling.   Neurological: Negative for headaches.   Hematological: Does not bruise/bleed easily.   All other systems reviewed and are negative.    I have reviewed the medical and surgical history, family history, social history, medications, and/or allergies, and the review of systems of this report.    Objective   There were no vitals taken for this visit.      Signs of infection: [x] no                    [] yes   Drainage: [x] no                    [] yes   Incision: [x] healing well     []healed well   Motor exam intact: [] no                    [x] yes   Neurovascular exam intact: [] no                    [x] yes   Signs of compartment syndrome: [x] no                    [] yes   Signs of DVT: [x] no                    [] yes   Other:      Physical Exam  Ortho Exam     Extremity DVT signs are negative on physical exam with negative Gina sign, no calf pain, no palpable cords and no skin tone change  Neurologic Exam    Assessment/Plan     Independent Review of Radiographic Studies:    X-ray of the right ankle 3 views independently reviewed in the office with comparison films available to review reveal well-maintained position alignment of the fracture no evidence of hardware loosening     Procedures     April was seen today for suture / staple removal and post-op.    Diagnoses and all orders for this visit:    Closed fracture of distal end of right fibula, unspecified fracture morphology, initial encounter  -     XR Ankle 3+ View Right; Future         Recommendations/Plan:     Sutures Staples or Pins [x] Removed today  [] At prior visit  [] Plan removal later   Physical therapy: []rehab facility  []outpatient referral  [] therapy ongoing   Ultrasound: [x]not ordered         []order given to patient   Labs: [x]not ordered         []order given to patient   Weight Bearing status: []Full []WBAT []PWB [x]NWB []Other     Continue using the pneumatic boot. NWB  Protect the incision with well padded gauze while wearing boot  Follow up in 2 weeks x-ray on arrival plan to enroll in therapy and may advance to weightbearing as tolerated in the pneumatic boot  Patient is encouraged and agreeable to call or return sooner for any issues or concerns.

## 2020-10-21 ENCOUNTER — OFFICE VISIT (OUTPATIENT)
Dept: ORTHOPEDIC SURGERY | Facility: CLINIC | Age: 45
End: 2020-10-21

## 2020-10-21 VITALS — BODY MASS INDEX: 35.65 KG/M2 | RESPIRATION RATE: 18 BRPM | WEIGHT: 214 LBS | HEIGHT: 65 IN

## 2020-10-21 DIAGNOSIS — S82.831A CLOSED FRACTURE OF DISTAL END OF RIGHT FIBULA, UNSPECIFIED FRACTURE MORPHOLOGY, INITIAL ENCOUNTER: Primary | ICD-10-CM

## 2020-10-21 DIAGNOSIS — B37.9 ANTIBIOTIC-INDUCED YEAST INFECTION: ICD-10-CM

## 2020-10-21 DIAGNOSIS — T36.95XA ANTIBIOTIC-INDUCED YEAST INFECTION: ICD-10-CM

## 2020-10-21 PROCEDURE — 99024 POSTOP FOLLOW-UP VISIT: CPT | Performed by: PHYSICIAN ASSISTANT

## 2020-10-21 RX ORDER — FLUCONAZOLE 150 MG/1
TABLET ORAL
Qty: 2 TABLET | Refills: 0 | Status: SHIPPED | OUTPATIENT
Start: 2020-10-21

## 2020-10-21 RX ORDER — FLUCONAZOLE 150 MG/1
TABLET ORAL
COMMUNITY
Start: 2020-10-19

## 2020-10-21 NOTE — PROGRESS NOTES
Subjective   Patient ID: Vanna Nicolas is a 45 y.o. right hand dominant female is here today for a post-operative visit.  Post-op of the Right Ankle (Right distal fibula open reduction internal fixation 9/25/20, denies pain)          CHIEF COMPLAINT:        History of Present Illness      Pain controlled: [] no   [x] yes   Medication refill requested: [x] no   [] yes    Patient compliant with instructions: [] no   [x] yes   Other: Reports excellent progress since surgery. She denies ankle pain.   She is concerned because she is dealing with a yeast infection from being on the recent abx.      Past Medical History:   Diagnosis Date   • Anxiety and depression    • Arthritis    • Body piercing     EARS BILATERAL   • Bursitis    • Chest pain unsure of year    WITH EXCEDRINE TOO MUCH CAFFEINE.  LAST EPISODE WAS 2/2020.  PT WENT TO E.R.  STATED F/U WITH DR LEAL AND WAS TOLD THAT IT WAS DUE TO ACID REFLUX.    • Diarrhea    • Elevated cholesterol    • Fibromyalgia    • GERD (gastroesophageal reflux disease)    • Hernia, umbilical    • High cholesterol    • Hip pain, acute, right    • History of nuclear stress test 2014   • Hypertension    • IBS (irritable bowel syndrome)    • Knee pain, bilateral    • Malignant melanoma (CMS/HCC)     stage 1, left leg   • Migraines    • OCD (obsessive compulsive disorder)    • PTSD (post-traumatic stress disorder)    • Sleep apnea     CPAP USAGE   • Ulcerative colitis (CMS/HCC)    • Wears glasses         Past Surgical History:   Procedure Laterality Date   • ANKLE OPEN REDUCTION INTERNAL FIXATION Right 9/25/2020    Procedure: Right distal fibula open reduction internal fixation;  Surgeon: Danny Fuller MD;  Location: [facility] OR;  Service: Orthopedics;  Laterality: Right;   • CARPAL TUNNEL RELEASE Right 1/10/2018    Procedure: ELECTIVE RIGHT CARPAL TUNNEL RELEASE;  Surgeon: Gus Ramos MD;  Location: [facility] OR;  Service:    • CARPAL TUNNEL RELEASE Left 4/18/2018     "Procedure: ELECTIVE LEFT CARPAL TUNNEL RELEASE;  Surgeon: Gus Ramos MD;  Location: [facility];  Service: Orthopedics   • COLONOSCOPY     • ENDOSCOPY     • LAPAROSCOPIC TUBAL LIGATION     • SKIN BIOPSY      STAGE 1 LEFT THIGH   • SKIN CANCER EXCISION     • TOTAL ABDOMINAL HYSTERECTOMY WITH SALPINGO OOPHORECTOMY  2004    ? Hormonal migraines   • WISDOM TOOTH EXTRACTION         Allergies   Allergen Reactions   • Penicillins Unknown (See Comments)     'AS A BABY ALMOST PUT ME INTO A COMA'   • Morphine And Related Nausea And Vomiting   • Tetracyclines & Related Hives       Review of Systems   Constitutional: Negative for diaphoresis, fever and unexpected weight change.   HENT: Negative for dental problem and sore throat.    Eyes: Negative for visual disturbance.   Respiratory: Negative for shortness of breath.    Cardiovascular: Negative for chest pain.   Gastrointestinal: Negative for abdominal pain, constipation, diarrhea, nausea and vomiting.   Genitourinary: Negative for difficulty urinating and frequency.   Neurological: Negative for headaches.   Hematological: Does not bruise/bleed easily.     I have reviewed the medical and surgical history, family history, social history, medications, and/or allergies, and the review of systems of this report.    Objective   Resp 18   Ht 165.1 cm (65\")   Wt 97.1 kg (214 lb)   BMI 35.61 kg/m²       Signs of infection: [x] no                    [] yes   Drainage: [x] no                    [] yes   Incision: [x] healing well     []healed well   Motor exam intact: [] no                    [x] yes   Neurovascular exam intact: [] no                    [x] yes   Signs of compartment syndrome: [x] no                    [] yes   Signs of DVT: [x] no                    [] yes   Other:      Physical Exam  Ortho Exam    Extremity DVT signs are negative on physical exam with negative Gina sign, no calf pain, no palpable cords and no skin tone change  Neurologic " Exam    Assessment/Plan     Independent Review of Radiographic Studies:    AP and lateral of the right ankle, indication to evaluate fracture healing, and compared with prior imaging, shows interim fracture healing, callus and or periostitis with good maintained reduction and alignment and intact position of fracture fixation hardware.    Laboratory and Other Studies:  No new results reviewed today.     Medical Decision Making:    Stable neurovascular exam.     Procedures     Diagnoses and all orders for this visit:    1. Closed fracture of distal end of right fibula, unspecified fracture morphology, initial encounter (Primary)  -     XR Ankle 3+ View Right; Future  -     Ambulatory Referral to Physical Therapy Evaluate and treat, POST OP    2. Antibiotic-induced yeast infection  -     fluconazole (Diflucan) 150 MG tablet; One tablet by mouth every 72 hours for a total of 2 doses  Dispense: 2 tablet; Refill: 0         Recommendations/Plan:     Sutures Staples or Pins [] Removed today  [x] At prior visit  [] Plan removal later   Physical therapy: []rehab facility  [x]outpatient referral  [] therapy ongoing   Ultrasound: [x]not ordered         []order given to patient   Labs: [x]not ordered         []order given to patient   Weight Bearing status: []Full []WBAT [x]PWB []NWB []Other     Guided on proper techniques for mobility, strength, agility and/or conditioning exercises  Weight bearing parameters reviewed  Physical therapy referral given  Take prescribed medications as instructed only as tolerated     Exercise, medications, injections, other patient advice, and return appointment as noted.    Must wear the boot when ambulating  Follow up in 4 weeks  Advised to eat yogurt  If no improvement from yeast meds, please contact your PCP for additional eval.    Patient is encouraged and agreeable to call or return sooner for any issues or concerns.

## 2020-10-24 ENCOUNTER — HOSPITAL ENCOUNTER (OUTPATIENT)
Facility: HOSPITAL | Age: 45
Discharge: HOME | End: 2020-10-24
Payer: MEDICAID

## 2020-10-25 ENCOUNTER — HOSPITAL ENCOUNTER (OUTPATIENT)
Facility: HOSPITAL | Age: 45
Discharge: HOME | End: 2020-10-25
Payer: MEDICAID

## 2020-10-25 LAB — C DIFF TOXIN/ANTIGEN: NORMAL

## 2020-10-25 PROCEDURE — 87449 NOS EACH ORGANISM AG IA: CPT

## 2020-10-25 PROCEDURE — 87324 CLOSTRIDIUM AG IA: CPT

## 2020-10-27 ENCOUNTER — HOSPITAL ENCOUNTER (OUTPATIENT)
Dept: PHYSICAL THERAPY | Facility: HOSPITAL | Age: 45
Setting detail: THERAPIES SERIES
Discharge: HOME | End: 2020-10-27
Payer: MEDICAID

## 2020-10-27 PROCEDURE — 97110 THERAPEUTIC EXERCISES: CPT

## 2020-10-27 PROCEDURE — 97161 PT EVAL LOW COMPLEX 20 MIN: CPT

## 2020-10-27 PROCEDURE — 97140 MANUAL THERAPY 1/> REGIONS: CPT

## 2020-10-27 NOTE — PROGRESS NOTES
Physical Therapy  Initial Assessment  Date: 10/27/2020  Patient Name: Michaela Llamas  MRN: [id number]  : 1975     Treatment Diagnosis: s/p right distal fibula fracture with ORIF; presents with ROM, strength, and functional deficits    Restrictions  Restrictions/Precautions  Restrictions/Precautions: Weight Bearing  Required Braces or Orthoses?: Yes  Lower Extremity Weight Bearing Restrictions  Right Lower Extremity Weight Bearing: Partial Weight Bearing  Partial Weight Bearing Percentage Or Pounds: patient reports she can do full WBing with boot on, but is NOT to do full WBing without the boot until her next doctor's visit  Required Braces or Orthoses  Right Lower Extremity Brace:  Ankle Foot Orthotics    Subjective   General  Chart Reviewed: Yes  Patient assessed for rehabilitation services?: Yes  Response To Previous Treatment: Not applicable  Family / Caregiver Present: No  Referring Practitioner: Jaguar Grady PA-C  Diagnosis: s/p right distal fibula fracture (DOI: 2020), s/p ORIF (DOS: 2020)  Follows Commands: Within Functional Limits  PT Visit Information  PT Insurance Information: [place] Medicaid  Subjective  Subjective: Patient reports: she injured her right foot on 2020 when she fell down her steps; underwent ORIF surgery including a plate and screws; describes only mild pain overall; has been doing ROM exercises at home for knee and ankle; has been using her walking boot since surgery; states recent x-rays showed good healing  Pain Screening  Patient Currently in Pain: Denies  Vital Signs  Patient Currently in Pain: Denies    Vision/Hearing  Vision  Vision: Within Functional Limits  Hearing  Hearing: Within functional limits    Orientation  Orientation  Overall Orientation Status: Within Normal Limits    Social/Functional History  Social/Functional History  Type of Home: Apartment  Occupation: Unemployed  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    Objective Observation/Palpation  Posture: Good  Palpation: mild general tenderness right ankle lateral aspect  Observation: Gait = WFL with orthopedic boot on right: Right ankle: mild-moderate edema right ankle, foot; incision is well approximated, clean and dry, no errythema, steri-strips intact at proximal incision    AROM RLE (degrees)  R Hip Flexion 0-125: WFL  R Knee Flexion 0-145: WFL  R Knee Extension 0: WFL  R Ankle Dorsiflexion 0-20: 0  R Ankle Plantar Flexion 0-45: 40  R Ankle Forefoot Inversion 0-40: 25  R Ankle Forefoot Eversion 0-20: 8    Strength RLE  R Hip Flexion: 4+/5  R Hip ABduction: 4+/5  R Knee Flexion: 4+/5  R Knee Extension: 4+/5  R Ankle Dorsiflexion: 4-/5  R Ankle Plantar flexion: 4-/5  R Ankle Inversion: 4-/5  R Ankle Eversion: 4-/5  Tone RLE  RLE Tone: Normotonic  Tone LLE  LLE Tone: Normotonic  Motor Control  Gross Motor?: WFL  Additional Measures  Other: LEFS = 39/80  Sensation  Overall Sensation Status: WFL                   Exercises  Exercise 1: Right SLR: flexion, abduction - 3 x 10  Exercise 2: right heel slides - 2 x 20  Exercise 3: seated heel-toe raises - 3 x 15  Exercise 4: toe towel curls - 1' x 3  Exercise 5: Right ankle AROM - 4-way - 2 x 20  Exercise 6: Manual: right ankle: grade 1 mobs, PROM with emphasis on DF ROM, retrograde STM,  x 10-15'  Exercise 7: Nustep: L5 x 8'                      Assessment   Conditions Requiring Skilled Therapeutic Intervention  Body structures, Functions, Activity limitations: Decreased functional mobility ; Decreased ROM; Decreased strength;Decreased high-level IADLs  Assessment: s/p right distal fibula fracture with ORIF; presents with ROM, strength, and functional deficits; will benefit from PT to help restore her mobility, strength, and ROM.   Treatment Diagnosis: s/p right distal fibula fracture with ORIF; presents with ROM, strength, and functional deficits  Prognosis: Excellent  Decision Making: Low Complexity  REQUIRES PT FOLLOW UP: Yes  Treatment Initiated : Evaluation, manual tx, therex/HEP, POC review  Activity Tolerance  Activity Tolerance: Patient Tolerated treatment well         Plan   Plan  Times per week: 2 x week  Plan weeks: 6-8 weeks  Current Treatment Recommendations: Strengthening, Neuromuscular Re-education, Home Exercise Program, ROM, Balance Training, Manual Therapy - Soft Tissue Mobilization, Manual Therapy - Joint Manipulation, Modalities, Gait Training, ADL/Self-care Training               Goals  Long term goals  Time Frame for Long term goals : 6-8 weeks  Long term goal 1: Be independent with HEP. Long term goal 2: Achieve right ankle DF AROM to 10 degrees. Long term goal 3: Achieve a normal gait pattern on level surfaces. Long term goal 4: Achieve at least 5-/5 right hip, knee, and ankle AROM. Long term goal 5: Achieve a LEFS score of 48/80. Therapy Time   Individual Concurrent Group Co-treatment   Time In           Time Out           Minutes                   Eli Ragland, PT       Certification of Medical Necessity: It will be understood that this treatment plan is certified medically necessary by the documenting therapist and referring physician mentioned in this report. Unless the physician indicated otherwise through written correspondence with our office, all further referrals will act as certification of medical necessity on this treatment plan. Thank you for this referral.  If you have questions regarding this plan of care, please call [phone].           Revisions to this plan (optional):                     Please sign and return this plan to:   FAX: [phone]      Signature:                                 Date:

## 2020-10-30 ENCOUNTER — HOSPITAL ENCOUNTER (OUTPATIENT)
Dept: PHYSICAL THERAPY | Facility: HOSPITAL | Age: 45
Setting detail: THERAPIES SERIES
Discharge: HOME | End: 2020-10-30
Payer: MEDICAID

## 2020-10-30 PROCEDURE — 97110 THERAPEUTIC EXERCISES: CPT

## 2020-10-30 PROCEDURE — 97140 MANUAL THERAPY 1/> REGIONS: CPT

## 2020-11-03 ENCOUNTER — HOSPITAL ENCOUNTER (OUTPATIENT)
Dept: PHYSICAL THERAPY | Facility: HOSPITAL | Age: 45
Setting detail: THERAPIES SERIES
Discharge: HOME | End: 2020-11-03
Payer: MEDICAID

## 2020-11-03 PROCEDURE — 97110 THERAPEUTIC EXERCISES: CPT

## 2020-11-03 PROCEDURE — 97140 MANUAL THERAPY 1/> REGIONS: CPT

## 2020-11-03 NOTE — FLOWSHEET NOTE
Physical Therapy Daily Treatment Note   Date:  11/3/2020    TIme In:    12                  Time Out:  46    Patient Name:  Michaela Leon    :  1975  MRN: [id number]    Restrictions/Precautions:      * NO FWB WITHOUT BOOT [place] Noelle NEXT MD VISIT *  Pertinent Medical History:  Medical/Treatment Diagnosis Information:  ·   s/p right distal fibula fracture with ORIF; presents with ROM, strength, and functional deficits     Insurance/Certification information:    [place] [employer]  Physician Information:    Kan Coyne PA-C  Plan of care signed (Y/N):    Visit# / total visits:  3/    G-Code (if applicable):      Date / Visit # G-Code Applied:         Progress Note: []  Yes  [x]  No  Next due by: Visit #10      Pain level:  0 /10    Subjective: Pt completed tx with no pain and reports her foot is always swollen but her pain is always good. Objective:   Observation:    Test measurements:     Palpation:    Exercises:  Exercise Resistance/Repetitions Other comments   Nustep - boot on L5 x 8 3   Right heel slides 2 x 20 3   Seated heel-toe raises 3 x 15 3   Toe towel curls 1' x 3 3   Right ankle AROM 2 x 20 3                                        Other Therapeutic Activities:      Manual Treatments:   right ankle: grade 1 mobs, PROM with emphasis on DF ROM, retrograde STM,  x 10'    Modalities:        Timed Code Treatment Minutes:  34      Total Treatment Minutes:  36    Treatment/Activity Tolerance:  [x] Patient tolerated treatment well [] Patient limited by fatigue  [] Patient limited by pain  [] Patient limited by other medical complications  [x] Other:  Pt completed tx with no pain and mod edema noted in right LE.     Pain after treatment:    0  /10    Prognosis: [x] Good [] Fair  [] Poor    Patient Requires Follow-up: [x] Yes  [] No    Plan:   [x] Continue per plan of care [] Alter current plan (see comments)  [] Plan of care initiated [] Hold pending MD visit [] Discharge    Plan for Next Session:        Electronically signed by:  Ricardo Sood PTA

## 2020-11-05 ENCOUNTER — HOSPITAL ENCOUNTER (OUTPATIENT)
Dept: PHYSICAL THERAPY | Facility: HOSPITAL | Age: 45
Setting detail: THERAPIES SERIES
Discharge: HOME | End: 2020-11-05
Payer: MEDICAID

## 2020-11-05 PROCEDURE — 97140 MANUAL THERAPY 1/> REGIONS: CPT

## 2020-11-05 PROCEDURE — 97110 THERAPEUTIC EXERCISES: CPT

## 2020-11-10 ENCOUNTER — HOSPITAL ENCOUNTER (OUTPATIENT)
Dept: PHYSICAL THERAPY | Facility: HOSPITAL | Age: 45
Setting detail: THERAPIES SERIES
Discharge: HOME | End: 2020-11-10
Payer: MEDICAID

## 2020-11-10 PROCEDURE — 97110 THERAPEUTIC EXERCISES: CPT

## 2020-11-10 PROCEDURE — 97140 MANUAL THERAPY 1/> REGIONS: CPT

## 2020-11-10 NOTE — FLOWSHEET NOTE
Physical Therapy Daily Treatment Note   Date:  11/10/2020    TIme In:    0954                  Time Out:  80    Patient Name:  Michaela Leon    :  1975  MRN: [id number]    Restrictions/Precautions:      * NO FWB WITHOUT BOOT UNTIL NEXT MD VISIT *  Pertinent Medical History:  Medical/Treatment Diagnosis Information:  ·   s/p right distal fibula fracture with ORIF; presents with ROM, strength, and functional deficits     Insurance/Certification information:    [place] [employer]  Physician Information:    Eugene Booker PA-C  Plan of care signed (Y/N):    Visit# / total visits:  5 /    G-Code (if applicable):      Date / Visit # G-Code Applied:         Progress Note: []  Yes  [x]  No  Next due by: Visit #10      Pain level:  2 10    Subjective: Pt reports: ankle is sore, states she walked some in her apartment without the boot on; soreness started that evening     Objective:   Observation:    Test measurements:     Palpation:    Exercises:  Exercise Resistance/Repetitions Other comments   Nustep - boot on L5 x 8 10   Right heel slides 2 x 20 10   Seated heel-toe raises 3 x 15 10   Toe towel curls 1' x 3 10   Right ankle AROM 2 x 20 10        Right SLR: flex, ext, abd 2 x 20 each 10                              Other Therapeutic Activities:      Manual Treatments:   right ankle: grade 1 mobs, PROM with emphasis on DF ROM, retrograde STM,  x 15'    Modalities: Ice pack right ankle x 12' to finish      Timed Code Treatment Minutes:  50      Total Treatment Minutes:  66    Treatment/Activity Tolerance:  [x] Patient tolerated treatment well [] Patient limited by fatigue  [] Patient limited by pain  [] Patient limited by other medical complications  [x] Other:  Pt completed tx with no pain and mod edema noted in right foot.     Pain after treatment:    0/10    Prognosis: [x] Good [] Fair  [] Poor    Patient Requires Follow-up: [x] Yes  [] No    Plan:   [x] Continue per plan of care [] Alter current plan (see comments)  [] Plan of care initiated [] Hold pending MD visit [] Discharge    Plan for Next Session:        Electronically signed by:  Brad Valencia PT

## 2020-11-12 ENCOUNTER — HOSPITAL ENCOUNTER (OUTPATIENT)
Dept: PHYSICAL THERAPY | Facility: HOSPITAL | Age: 45
Setting detail: THERAPIES SERIES
Discharge: HOME | End: 2020-11-12
Payer: MEDICAID

## 2020-11-12 PROCEDURE — 97140 MANUAL THERAPY 1/> REGIONS: CPT

## 2020-11-12 PROCEDURE — 97110 THERAPEUTIC EXERCISES: CPT

## 2020-11-12 NOTE — FLOWSHEET NOTE
Physical Therapy Daily Treatment Note   Date:  2020    TIme In:    55                  Time Out:  200    Patient Name:  Michaela Leon    :  1975  MRN: [id number]    Restrictions/Precautions:      * NO FWB WITHOUT BOOT Nannette Benson NEXT MD VISIT *  Pertinent Medical History:  Medical/Treatment Diagnosis Information:  ·   s/p right distal fibula fracture with ORIF; presents with ROM, strength, and functional deficits     Insurance/Certification information:    [employer]  Physician Information:    Freida Enriquez PA-C  Plan of care signed (Y/N):    Visit# / total visits:  6 /    G-Code (if applicable):      Date / Visit # G-Code Applied:         Progress Note: []  Yes  [x]  No  Next due by: Visit #10      Pain level:  0 /10    Subjective: Pt reports: ankle feels better today; no changes or new c/o. Objective:   Observation:    Test measurements:     Palpation:    Exercises:  Exercise Resistance/Repetitions Other comments   Nustep - boot on L5 x 8 12   Right heel slides 2 x 20 12   Seated heel-toe raises 3 x 15 12   Toe towel curls 1' x 3 12   Right ankle AROM 2 x 20 12        Right SLR: flex, ext, abd 2 x 20 each 12                              Other Therapeutic Activities:      Manual Treatments:   right ankle: grade 1 mobs, PROM with emphasis on DF ROM, retrograde STM,  x 15' -     Modalities: Ice pack right ankle x 12' to finish      Timed Code Treatment Minutes:  50      Total Treatment Minutes:  66'    Treatment/Activity Tolerance:  [x] Patient tolerated treatment well [] Patient limited by fatigue  [] Patient limited by pain  [] Patient limited by other medical complications  [x] Other:  Pt completed tx with no pain and mod edema noted in right foot.     Pain after treatment:    0/10    Prognosis: [x] Good [] Fair  [] Poor    Patient Requires Follow-up: [x] Yes  [] No    Plan:   [x] Continue per plan of care [] Alter current plan (see comments)  [] Plan of care initiated [] Hold pending MD visit [] Discharge    Plan for Next Session:        Electronically signed by:  Alize Bills PT

## 2020-11-17 ENCOUNTER — HOSPITAL ENCOUNTER (OUTPATIENT)
Dept: PHYSICAL THERAPY | Facility: HOSPITAL | Age: 45
Setting detail: THERAPIES SERIES
End: 2020-11-17
Payer: MEDICAID

## 2020-11-19 ENCOUNTER — HOSPITAL ENCOUNTER (OUTPATIENT)
Dept: PHYSICAL THERAPY | Facility: HOSPITAL | Age: 45
Setting detail: THERAPIES SERIES
Discharge: HOME | End: 2020-11-19
Payer: MEDICAID

## 2020-11-19 PROCEDURE — 97110 THERAPEUTIC EXERCISES: CPT

## 2020-11-19 PROCEDURE — 97140 MANUAL THERAPY 1/> REGIONS: CPT

## 2020-11-19 NOTE — FLOWSHEET NOTE
Physical Therapy Daily Treatment Note   Date:  2020    TIme In:    0957                  Time Out:  1101    Patient Name:  Michaela Leon    :  1975  MRN: [id number]    Restrictions/Precautions:      * NO FWB WITHOUT BOOT Johann Drake NEXT MD VISIT *  Pertinent Medical History:  Medical/Treatment Diagnosis Information:  ·   s/p right distal fibula fracture with ORIF; presents with ROM, strength, and functional deficits     Insurance/Certification information:    [place] [employer]  Physician Information:    Kan Coyne PA-C  Plan of care signed (Y/N):    Visit# / total visits:  7 /    G-Code (if applicable):      Date / Visit # G-Code Applied:         Progress Note: []  Yes  [x]  No  Next due by: Visit #10      Pain level:  0 /10    Subjective: Pt reports: ankle feels better today; no changes or new c/o. Objective:   Observation:    Test measurements:     Palpation:    Exercises:  Exercise Resistance/Repetitions Other comments   Nustep - boot on L5 x 8 19   Right heel slides 2 x 20 19   Seated heel-toe raises 3 x 15 19   Toe towel curls 1' x 3 19   Right ankle AROM 2 x 20 19        Right SLR: flex, ext, abd 2 x 20 each 19                              Other Therapeutic Activities:      Manual Treatments:   right ankle: grade 1 mobs, PROM with emphasis on DF ROM, retrograde STM,  x 15' -     Modalities: Ice pack right ankle x 12' to finish      Timed Code Treatment Minutes:  50      Total Treatment Minutes:  59'    Treatment/Activity Tolerance:  [x] Patient tolerated treatment well [] Patient limited by fatigue  [] Patient limited by pain  [] Patient limited by other medical complications  [x] Other:  Pt completed tx with no pain and mod edema noted in right foot.     Pain after treatment:    0/10    Prognosis: [x] Good [] Fair  [] Poor    Patient Requires Follow-up: [x] Yes  [] No    Plan:   [x] Continue per plan of care [] Alter current plan (see comments)  [] Plan of care initiated [] Hold

## 2020-11-24 ENCOUNTER — HOSPITAL ENCOUNTER (OUTPATIENT)
Dept: PHYSICAL THERAPY | Facility: HOSPITAL | Age: 45
Setting detail: THERAPIES SERIES
Discharge: HOME | End: 2020-11-24
Payer: MEDICAID

## 2020-11-24 PROCEDURE — 97110 THERAPEUTIC EXERCISES: CPT

## 2020-11-24 PROCEDURE — 97140 MANUAL THERAPY 1/> REGIONS: CPT

## 2020-11-24 NOTE — FLOWSHEET NOTE
Physical Therapy Reassessment Note   Date:  2020    TIme In:  1000                    Time Out:  1053    Patient Name:  Michaela Leon    :  1975  MRN: [id number]    Restrictions/Precautions:      * NO FWB WITHOUT BOOT Bill Downer NEXT MD VISIT *  Pertinent Medical History:  Medical/Treatment Diagnosis Information:  ·   s/p right distal fibula fracture with ORIF; presents with ROM, strength, and functional deficits     Insurance/Certification information:    [place] [employer]  Physician Information:    Torito Fabian PA-C  Plan of care signed (Y/N):    Visit# / total visits:  8/     G-Code (if applicable):      Date / Visit # G-Code Applied:         Progress Note: []  Yes  [x]  No  Next due by: Visit #10      Pain level:   0/10    Subjective: Pt reports her swelling never changes and they believe it is related to an artery in her leg. She expressed that her ankle is doing ok. Objective:   Observation:    Test measurements:  LEFS: 50/80, Right ankle DF AROM: 3 deg.  Palpation:    Exercises:  Exercise Resistance/Repetitions Other comments   Nustep - boot on L5 x 8 24   Right heel slides 2 x 20 24   Seated heel-toe raises 3 x 15 24   Toe towel curls 1' x 3 24   Right ankle AROM 2 x 20 24   Ankle 4-way x15 orange 24   Right SLR: flex, ext, abd 2 x 20 each 24                              Other Therapeutic Activities:  Re-assessment performed by Kan Shore, PT. Manual Treatments:   right ankle: grade 1 mobs, PROM with emphasis on DF ROM, retrograde STM,  x 15' -     Modalities: Ice pack right ankle x 12' to finish      Timed Code Treatment Minutes:  40      Total Treatment Minutes:  53    Treatment/Activity Tolerance:  [x] Patient tolerated treatment well [] Patient limited by fatigue  [] Patient limited by pain  [] Patient limited by other medical complications  [x] Other:  Pt completed tx with no pain and improved right ankle DF AROM as well as improved functional measures.     Pain after treatment:    0/10     Goals  Long term goals  Time Frame for Long term goals : 6-8 weeks  Long term goal 1: Be independent with HEP. MET  Long term goal 2: Achieve right ankle DF AROM to 10 degrees. - not met  Long term goal 3: Achieve a normal gait pattern on level surfaces. - not met  Long term goal 4: Achieve at least 5-/5 right hip, knee, and ankle AROM. Long term goal 5: Achieve a LEFS score of 48/80. MET    Patient is showing steady progress; she is still required to wear the orthopedic boot, which has inhibited progress; she has a doctor follow-up this week and is anticipated to be able to d/c the boot and further progress activities.     Prognosis: [x] Good [] Fair  [] Poor    Patient Requires Follow-up: [x] Yes  [] No    Plan:   [x] Continue per plan of care [] Alter current plan (see comments)  [] Plan of care initiated [] Hold pending MD visit [] Discharge    Plan for Next Session:        Electronically signed by:  Armando Garcia PTA    Electronically signed by Terra Cuellar PT on 11/30/2020 at 2:30 PM

## 2020-11-25 ENCOUNTER — OFFICE VISIT (OUTPATIENT)
Dept: ORTHOPEDIC SURGERY | Facility: CLINIC | Age: 45
End: 2020-11-25

## 2020-11-25 VITALS — WEIGHT: 214 LBS | BODY MASS INDEX: 35.65 KG/M2 | TEMPERATURE: 97.1 F | HEIGHT: 65 IN

## 2020-11-25 DIAGNOSIS — S82.831D CLOSED FRACTURE OF DISTAL END OF RIGHT FIBULA WITH ROUTINE HEALING, UNSPECIFIED FRACTURE MORPHOLOGY, SUBSEQUENT ENCOUNTER: Primary | ICD-10-CM

## 2020-11-25 PROCEDURE — 99024 POSTOP FOLLOW-UP VISIT: CPT | Performed by: PHYSICIAN ASSISTANT

## 2020-11-25 NOTE — PROGRESS NOTES
"Subjective   Patient ID: April Judy Nicolas is a 45 y.o.  female  Post-op of the Right Ankle (S/P ORIF right distal fibula fracture on 9/25/20. Patient states she is not having pain, just occasional soreness)         History of Present Illness  Patient is following up for scheduled appointment regarding ORIF right distal fibula.  Date of surgery 9/25/2020.  Patient states overall she is doing great.  She is still enrolled in physical therapy.  Denies pain.  Denies numbness or tingling states she does have some swelling to the right ankle but states this is chronic for her she was told in the past she needed to have \"an artery burned\"                                                 Past Medical History:   Diagnosis Date   • Anxiety and depression    • Arthritis    • Body piercing     EARS BILATERAL   • Bursitis    • Chest pain unsure of year    WITH EXCEDRINE TOO MUCH CAFFEINE.  LAST EPISODE WAS 2/2020.  PT WENT TO E.R.  STATED F/U WITH DR LEAL AND WAS TOLD THAT IT WAS DUE TO ACID REFLUX.    • Diarrhea    • Elevated cholesterol    • Fibromyalgia    • GERD (gastroesophageal reflux disease)    • Hernia, umbilical    • High cholesterol    • Hip pain, acute, right    • History of nuclear stress test 2014   • Hypertension    • IBS (irritable bowel syndrome)    • Knee pain, bilateral    • Malignant melanoma (CMS/HCC)     stage 1, left leg   • Migraines    • OCD (obsessive compulsive disorder)    • PTSD (post-traumatic stress disorder)    • Sleep apnea     CPAP USAGE   • Ulcerative colitis (CMS/HCC)    • Wears glasses         Past Surgical History:   Procedure Laterality Date   • ANKLE OPEN REDUCTION INTERNAL FIXATION Right 9/25/2020    Procedure: Right distal fibula open reduction internal fixation;  Surgeon: Danny Fuller MD;  Location: [facility];  Service: Orthopedics;  Laterality: Right;   • CARPAL TUNNEL RELEASE Right 1/10/2018    Procedure: ELECTIVE RIGHT CARPAL TUNNEL RELEASE;  Surgeon: Gus Ramos MD;  " Location: [facility] OR;  Service:    • CARPAL TUNNEL RELEASE Left 2018    Procedure: ELECTIVE LEFT CARPAL TUNNEL RELEASE;  Surgeon: Gus Ramos MD;  Location: [facility];  Service: Orthopedics   • COLONOSCOPY     • ENDOSCOPY     • LAPAROSCOPIC TUBAL LIGATION     • SKIN BIOPSY      STAGE 1 LEFT THIGH   • SKIN CANCER EXCISION     • TOTAL ABDOMINAL HYSTERECTOMY WITH SALPINGO OOPHORECTOMY      ? Hormonal migraines   • WISDOM TOOTH EXTRACTION         Family History   Problem Relation Age of Onset   • Rheum arthritis Mother    • Ulcerative colitis Mother    • COPD Father    • Crohn's disease Sister    • No Known Problems Maternal Aunt    • No Known Problems Maternal Uncle    • No Known Problems Paternal Aunt    • No Known Problems Paternal Uncle    • Kidney disease Maternal Grandmother    • Alzheimer's disease Maternal Grandfather    • No Known Problems Paternal Grandmother    • No Known Problems Paternal Grandfather    • Breast cancer Neg Hx    • Ovarian cancer Neg Hx        Social History     Socioeconomic History   • Marital status: Single     Spouse name: Not on file   • Number of children: Not on file   • Years of education: Not on file   • Highest education level: Not on file   Occupational History     Employer: UNEMPLOYED   Tobacco Use   • Smoking status: Former Smoker     Packs/day: 1.00     Years: 9.00     Pack years: 9.00     Types: Cigarettes     Quit date: 2020     Years since quittin.2   • Smokeless tobacco: Never Used   • Tobacco comment: vaping, quit smoking 2020    Substance and Sexual Activity   • Alcohol use: No   • Drug use: No   • Sexual activity: Defer   Social History Narrative    Right hand dominant         Current Outpatient Medications:   •  amLODIPine (NORVASC) 5 MG tablet, , Disp: , Rfl:   •  atorvastatin (LIPITOR) 20 MG tablet, , Disp: , Rfl:   •  divalproex (DEPAKOTE) 500 MG 24 hr tablet, Take 1 tablet by mouth Every Night., Disp: 90 tablet, Rfl: 1  •  DULoxetine  (CYMBALTA) 30 MG capsule, Take 30 mg by mouth Daily., Disp: , Rfl:   •  estradiol (ESTRACE) 1 MG tablet, Take 1 tablet by mouth Daily. Take 1/2 tablet to 1 tablet by mouth daily, Disp: 30 tablet, Rfl: 5  •  fluconazole (DIFLUCAN) 150 MG tablet, , Disp: , Rfl:   •  fluconazole (Diflucan) 150 MG tablet, One tablet by mouth every 72 hours for a total of 2 doses, Disp: 2 tablet, Rfl: 0  •  hydroxychloroquine (PLAQUENIL) 200 MG tablet, Take 1 tablet by mouth Daily., Disp: 30 tablet, Rfl: 2  •  mesalamine (LIALDA) 1.2 g EC tablet, TAKE 2 TABLETS BY MOUTH ONE TIME A DAY, Disp: , Rfl: 1  •  MESALAMINE ER PO, Take 3.6 mg by mouth Daily., Disp: , Rfl:   •  nabumetone (RELAFEN) 750 MG tablet, Take 750 mg by mouth Daily., Disp: , Rfl:   •  SM Omeprazole 20 MG tablet delayed-release, Take 20 mg by mouth Daily., Disp: , Rfl:   •  triamterene-hydrochlorothiazide (MAXZIDE-25) 37.5-25 MG per tablet, Take 1 tablet by mouth Daily., Disp: 30 tablet, Rfl: 5    Allergies   Allergen Reactions   • Penicillins Unknown (See Comments)     'AS A BABY ALMOST PUT ME INTO A COMA'   • Morphine And Related Nausea And Vomiting   • Tetracyclines & Related Hives       Review of Systems   Constitutional: Negative for fever.   HENT: Negative for dental problem and voice change.    Eyes: Negative for visual disturbance.   Respiratory: Negative for shortness of breath.    Cardiovascular: Negative for chest pain.   Gastrointestinal: Negative for abdominal pain.   Genitourinary: Negative for dysuria.   Musculoskeletal: Positive for arthralgias, gait problem and joint swelling.   Skin: Negative for rash.   Neurological: Negative for speech difficulty.   Hematological: Does not bruise/bleed easily.   Psychiatric/Behavioral: Negative for confusion.       I have reviewed the medical and surgical history, family history, social history, medications, and/or allergies, and the review of systems of this report.    Objective   Temp 97.1 °F (36.2 °C)   Ht 165.1 cm  "(65\")   Wt 97.1 kg (214 lb)   BMI 35.61 kg/m²    Physical Exam  Vitals signs and nursing note reviewed.   Constitutional:       Appearance: Normal appearance.   Pulmonary:      Effort: Pulmonary effort is normal. No respiratory distress.   Musculoskeletal:      Right knee: No tenderness found. No medial joint line and no lateral joint line tenderness noted.      Right ankle: She exhibits no ecchymosis, no deformity, no laceration and normal pulse. Achilles tendon exhibits no pain, no defect and normal Mckenzie's test results.      Right foot: Normal capillary refill.   Neurological:      General: No focal deficit present.      Mental Status: She is alert and oriented to person, place, and time.   Psychiatric:         Mood and Affect: Mood normal.       Ortho Exam   Extremity DVT signs are negative on physical exam with negative Gina sign, no calf pain, no palpable cords and no skin tone change   Neurologic Exam     Mental Status   Oriented to person, place, and time.          Assessment/Plan   Independent Review of Radiographic Studies:    AP and lateral of the right ankle, indication to evaluate fracture healing, and compared with prior imaging, shows interim fracture healing, callus and or periostitis with good maintained reduction and alignment and intact position of fracture fixation hardware.      Procedures       Diagnoses and all orders for this visit:    1. Closed fracture of distal end of right fibula with routine healing, unspecified fracture morphology, subsequent encounter (Primary)  -     XR Ankle 3+ View Right; Future       Orthopedic activities reviewed and patient expressed appreciation  Discussion of orthopedic goals  Risk, benefits, and merits of treatment alternatives reviewed with the patient and questions answered  Physical therapy ongoing    Recommendations/Plan:  Exercise, medications, injections, other patient advice, and return appointment as noted.  Patient is encouraged to call or " return for any issues or concerns.    Follow up as needed    Patient agreeable to call or return sooner for any concerns.               EMR Dragon-transcription disclaimer:  This encounter note is an electronic transcription of spoken language to printed text.  Electronic transcription of spoken language may permit erroneous or at times nonsensical words or phrases to be inadvertently transcribed.  Although I have reviewed the note for such errors, some may still exist

## 2020-11-27 ENCOUNTER — HOSPITAL ENCOUNTER (OUTPATIENT)
Dept: PHYSICAL THERAPY | Facility: HOSPITAL | Age: 45
Setting detail: THERAPIES SERIES
Discharge: HOME | End: 2020-11-27
Payer: MEDICAID

## 2020-11-27 PROCEDURE — 97140 MANUAL THERAPY 1/> REGIONS: CPT

## 2020-11-27 PROCEDURE — 97110 THERAPEUTIC EXERCISES: CPT

## 2020-11-27 NOTE — FLOWSHEET NOTE
Physical Therapy Daily Treatment Note   Date:  2020    TIme In:  0959                    Time Out:  1103    Patient Name:  Michaela Leon    :  1975  MRN: [id number]    Restrictions/Precautions:       Pertinent Medical History:  Medical/Treatment Diagnosis Information:  ·   s/p right distal fibula fracture with ORIF; presents with ROM, strength, and functional deficits     Insurance/Certification information:    [employer]  Physician Information:    Blessing Grimes PA-C  Plan of care signed (Y/N):    Visit# / total visits:  9/     G-Code (if applicable):      Date / Visit # G-Code Applied:         Progress Note: []  Yes  [x]  No  Next due by: Visit #10      Pain level:  1/10    Subjective: Pt reports she was taken out of the boot on Wed and is sore from helping cook yesterday. Objective:   Observation:    Test measurements:  LEFS: 50/80, Right ankle DF AROM: 3 deg.  Palpation:    Exercises:  Exercise Resistance/Repetitions Other comments   Nustep - boot on L5 x 8 27   Right heel slides 2 x 20 27   Seated heel-toe raises 3 x 15 27 D/C next visit   Toe towel curls 1' x 3 27        Ankle 4-way x15 orange 27   Right SLR: flex, ext, abd 2 x 20 each 27   St HR/TR 2x15 27   SLS 10x10\" 27    3x1' 27               Other Therapeutic Activities:     Manual Treatments:   right ankle: grade 1 mobs, PROM with emphasis on DF ROM, retrograde STM,  x 15' -     Modalities: Ice pack right ankle x 12' to finish      Timed Code Treatment Minutes: 50      Total Treatment Minutes: 64    Treatment/Activity Tolerance:  [x] Patient tolerated treatment well [] Patient limited by fatigue  [] Patient limited by pain  [] Patient limited by other medical complications  [x] Other:  Pt completed tx with mild soreness especially with SLS today. Pain after treatment:    \"sore\"/10     Goals  Long term goals  Time Frame for Long term goals : 6-8 weeks  Long term goal 1: Be independent with HEP.  MET  Long term goal 2: Achieve right ankle DF AROM to 10 degrees. Long term goal 3: Achieve a normal gait pattern on level surfaces. Long term goal 4: Achieve at least 5-/5 right hip, knee, and ankle AROM. Long term goal 5: Achieve a LEFS score of 48/80.  MET    Prognosis: [x] Good [] Fair  [] Poor    Patient Requires Follow-up: [x] Yes  [] No    Plan:   [x] Continue per plan of care [] Alter current plan (see comments)  [] Plan of care initiated [] Hold pending MD visit [] Discharge    Plan for Next Session:        Electronically signed by:  Lennox Sood PTA

## 2020-12-01 ENCOUNTER — APPOINTMENT (OUTPATIENT)
Dept: PHYSICAL THERAPY | Facility: HOSPITAL | Age: 45
End: 2020-12-01
Payer: MEDICAID

## 2020-12-03 ENCOUNTER — HOSPITAL ENCOUNTER (OUTPATIENT)
Dept: PHYSICAL THERAPY | Facility: HOSPITAL | Age: 45
Setting detail: THERAPIES SERIES
Discharge: HOME | End: 2020-12-03
Payer: MEDICAID

## 2020-12-03 PROCEDURE — 97140 MANUAL THERAPY 1/> REGIONS: CPT

## 2020-12-03 PROCEDURE — 97110 THERAPEUTIC EXERCISES: CPT

## 2020-12-03 NOTE — FLOWSHEET NOTE
Physical Therapy Daily Treatment Note   Date:  12/3/2020    TIme In:  1025                    Time Out:  1132    Patient Name:  Michaela Leon    :  1975  MRN: [id number]    Restrictions/Precautions:       Pertinent Medical History:  Medical/Treatment Diagnosis Information:  ·   s/p right distal fibula fracture with ORIF; presents with ROM, strength, and functional deficits     Insurance/Certification information:    [employer]  Physician Information:    Tobias Triplett PA-C  Plan of care signed (Y/N):    Visit# / total visits:  10 /     G-Code (if applicable):      Date / Visit # G-Code Applied:         Progress Note: []  Yes  [x]  No  Next due by: Visit #10      Pain level:  1/10    Subjective: Pt reports she was taken out of the boot on Wed and is sore from helping cook yesterday. Objective:   Observation:    Test measurements:  LEFS: 50/80, Right ankle DF AROM: 3 deg.  Palpation:    Exercises:  Exercise Resistance/Repetitions Other comments   Nustep - no boot L5 x 8 3   Right heel slides 2 x 20 3   Standing heel-toe raises 3 x 15 3   Toe towel curls 1' x 3 3        Ankle 4-way x15 orange 3   Right SLR: flex, ext, abd 2 x 20 each 3   St HR/TR 2x15 3   SLS 10x10\" 3   Marching 3x1' 3   Mini lunges with support - forward weight shift * 3 x 20 *          Other Therapeutic Activities:     Manual Treatments:   right ankle: grade 1 mobs, PROM with emphasis on DF ROM, retrograde STM,  x 15' -     Modalities: Ice pack right ankle x 12' to finish      Timed Code Treatment Minutes: 54'      Total Treatment Minutes:  79'    Treatment/Activity Tolerance:  [x] Patient tolerated treatment well [] Patient limited by fatigue  [] Patient limited by pain  [] Patient limited by other medical complications  [x] Other:  Pt completed tx with mild soreness especially with SLS today.     Pain after treatment:    \"sore\"/10     Goals  Long term goals  Time Frame for Long term goals : 6-8 weeks  Long term goal 1: Be independent with HEP. MET  Long term goal 2: Achieve right ankle DF AROM to 10 degrees. Long term goal 3: Achieve a normal gait pattern on level surfaces. Long term goal 4: Achieve at least 5-/5 right hip, knee, and ankle AROM. Long term goal 5: Achieve a LEFS score of 48/80.  MET    Prognosis: [x] Good [] Fair  [] Poor    Patient Requires Follow-up: [x] Yes  [] No    Plan:   [x] Continue per plan of care [] Alter current plan (see comments)  [] Plan of care initiated [] Hold pending MD visit [] Discharge    Plan for Next Session:        Electronically signed by:  Mindi Brooks PT

## 2020-12-08 ENCOUNTER — HOSPITAL ENCOUNTER (OUTPATIENT)
Dept: PHYSICAL THERAPY | Facility: HOSPITAL | Age: 45
Setting detail: THERAPIES SERIES
Discharge: HOME | End: 2020-12-08
Payer: MEDICAID

## 2020-12-08 PROCEDURE — 97110 THERAPEUTIC EXERCISES: CPT

## 2020-12-08 PROCEDURE — 97140 MANUAL THERAPY 1/> REGIONS: CPT

## 2020-12-08 NOTE — FLOWSHEET NOTE
Physical Therapy Daily Treatment Note   Date:  2020    TIme In:  1053                   Time Out:  1159    Patient Name:  Michaela Leon    :  1975  MRN: [id number]    Restrictions/Precautions:       Pertinent Medical History:  Medical/Treatment Diagnosis Information:  ·   s/p right distal fibula fracture with ORIF; presents with ROM, strength, and functional deficits     Insurance/Certification information:    [employer]  Physician Information:    Blessing Grimes PA-C  Plan of care signed (Y/N):    Visit# / total visits:  11 /     G-Code (if applicable):      Date / Visit # G-Code Applied:         Progress Note: []  Yes  [x]  No  Next due by: Visit #10      Pain level:  1/10    Subjective: Pt reports her ankle is getting better but the swelling remains the same. Objective:   Observation:    Test measurements:  LEFS: 50/80, Right ankle DF AROM: 3 deg.  Palpation:    Exercises:  Exercise Resistance/Repetitions Other comments   Nustep - no boot L5 x 8 8   Right heel slides 2 x 20 8        Toe towel curls 1' x 3 8        Ankle 4-way x15 orange 8   Right SLR: flex, ext, abd 2 x 20 each 8   St HR/TR 2x15 8   SLS 10x10\" 8   Marching 3x1' 8   Mini lunges with support - forward weight shift * 3 x 20 8          Other Therapeutic Activities: There-ex observed by Jose Ruiz, PT. Manual Treatments:   right ankle: grade 1 mobs, PROM with emphasis on DF ROM, retrograde STM,  x 15' -     Modalities: Ice pack right ankle x 10' to finish      Timed Code Treatment Minutes: 47'      Total Treatment Minutes:  77'    Treatment/Activity Tolerance:  [x] Patient tolerated treatment well [] Patient limited by fatigue  [] Patient limited by pain  [] Patient limited by other medical complications  [x] Other:  Pt completed tx with mild pain and slight decrease in swelling.     Pain after treatment:    1/10     Goals  Long term goals  Time Frame for Long term goals : 6-8 weeks  Long term goal 1: Be independent with HEP. MET  Long term goal 2: Achieve right ankle DF AROM to 10 degrees. Long term goal 3: Achieve a normal gait pattern on level surfaces. Long term goal 4: Achieve at least 5-/5 right hip, knee, and ankle AROM. Long term goal 5: Achieve a LEFS score of 48/80.  MET    Prognosis: [x] Good [] Fair  [] Poor    Patient Requires Follow-up: [x] Yes  [] No    Plan:   [x] Continue per plan of care [] Alter current plan (see comments)  [] Plan of care initiated [] Hold pending MD visit [] Discharge    Plan for Next Session:        Electronically signed by:  Lisa Sood PTA

## 2020-12-10 ENCOUNTER — HOSPITAL ENCOUNTER (OUTPATIENT)
Dept: PHYSICAL THERAPY | Facility: HOSPITAL | Age: 45
Setting detail: THERAPIES SERIES
Discharge: HOME | End: 2020-12-10
Payer: MEDICAID

## 2020-12-10 PROCEDURE — 97140 MANUAL THERAPY 1/> REGIONS: CPT

## 2020-12-10 PROCEDURE — 97110 THERAPEUTIC EXERCISES: CPT

## 2020-12-10 NOTE — FLOWSHEET NOTE
Physical Therapy Daily Treatment Note/Discharge Summary  Date:  12/10/2020    TIme In:  1132                  Time Out:  1226    Patient Name:  Michaela Leon    :  1975  MRN: [id number]    Restrictions/Precautions:       Pertinent Medical History:  Medical/Treatment Diagnosis Information:  ·   s/p right distal fibula fracture with ORIF; presents with ROM, strength, and functional deficits     Insurance/Certification information:    [employer]  Physician Information:    Flora Simmonds, PA-C  Plan of care signed (Y/N):    Visit# / total visits:  12/     G-Code (if applicable):      Date / Visit # G-Code Applied:         Progress Note: []  Yes  [x]  No  Next due by: Visit #10      Pain level:  1/10    Subjective: Pt reports her ankle is doing ok and the new exercises felt okay. Objective:   Observation:    Test measurements:  LEFS: 51/80, Right ankle DF AROM: 3 deg.  Palpation:    Exercises:  Exercise Resistance/Repetitions Other comments   Nustep - no boot L5 x 8 10   Sidestepping 2 laps 10   Airex balance 30\" x 3 10                  St hip 3-way x15 orange 10   St HR/TR 2x15 10   SLS 10x10\" 10   Marching 3x1' 10   Mini lunges with support - forward weight shift 3 x 20 10          Other Therapeutic Activities:  HEP given and pt verbalized understanding. Manual Treatments:   right ankle: grade 1 mobs, PROM with emphasis on DF ROM, retrograde STM,  x 15' -     Modalities: Ice pack right ankle x 10' to finish      Timed Code Treatment Minutes: 40      Total Treatment Minutes:  54    Treatment/Activity Tolerance:  [x] Patient tolerated treatment well [] Patient limited by fatigue  [] Patient limited by pain  [] Patient limited by other medical complications  [x] Other:  Pt completed tx with no pain and improved functional measures. Pt met 3/5 LTG's and is progressing well toward all others.     Pain after treatment:    0/10     Goals  Long term goals  Time Frame for Long term goals : 6-8 weeks  Long term goal 1: Be independent with HEP. MET  Long term goal 2: Achieve right ankle DF AROM to 10 degrees. Long term goal 3: Achieve a normal gait pattern on level surfaces. MET  Long term goal 4: Achieve at least 5-/5 right hip, knee, and ankle AROM. Long term goal 5: Achieve a LEFS score of 48/80. MET    Prognosis: [x] Good [] Fair  [] Poor    Patient Requires Follow-up: [] Yes  [x] No    Plan:   [] Continue per plan of care [] Alter current plan (see comments)  [] Plan of care initiated [] Hold pending MD visit [x] Discharge    Plan for Next Session:   D/C to HEP.       Electronically signed by:  Tobin Sood PTA

## 2020-12-30 ENCOUNTER — TRANSCRIBE ORDERS (OUTPATIENT)
Dept: ADMINISTRATIVE | Facility: HOSPITAL | Age: 45
End: 2020-12-30

## 2020-12-30 DIAGNOSIS — N63.10 LUMP OF BREAST, RIGHT: Primary | ICD-10-CM

## 2021-01-06 ENCOUNTER — HOSPITAL ENCOUNTER (OUTPATIENT)
Facility: HOSPITAL | Age: 46
Discharge: HOME | End: 2021-01-06
Payer: MEDICAID

## 2021-01-06 LAB — C DIFF TOXIN/ANTIGEN: NORMAL

## 2021-01-06 PROCEDURE — 87449 NOS EACH ORGANISM AG IA: CPT

## 2021-01-06 PROCEDURE — 87324 CLOSTRIDIUM AG IA: CPT

## 2021-01-25 ENCOUNTER — HOSPITAL ENCOUNTER (OUTPATIENT)
Dept: MAMMOGRAPHY | Facility: HOSPITAL | Age: 46
Discharge: HOME | End: 2021-01-25

## 2021-01-25 ENCOUNTER — HOSPITAL ENCOUNTER (OUTPATIENT)
Dept: ULTRASOUND IMAGING | Facility: HOSPITAL | Age: 46
Discharge: HOME | End: 2021-01-25

## 2021-01-25 DIAGNOSIS — N63.10 LUMP OF BREAST, RIGHT: ICD-10-CM

## 2021-01-25 PROCEDURE — 76642 ULTRASOUND BREAST LIMITED: CPT

## 2021-01-25 PROCEDURE — G0279 TOMOSYNTHESIS, MAMMO: HCPCS

## 2021-01-25 PROCEDURE — 77066 DX MAMMO INCL CAD BI: CPT

## 2022-01-12 ENCOUNTER — HOSPITAL ENCOUNTER (OUTPATIENT)
Dept: GENERAL RADIOLOGY | Facility: HOSPITAL | Age: 47
Discharge: HOME | End: 2022-01-12
Payer: MEDICAID

## 2022-01-12 ENCOUNTER — HOSPITAL ENCOUNTER (OUTPATIENT)
Facility: HOSPITAL | Age: 47
Discharge: HOME | End: 2022-01-12
Payer: MEDICAID

## 2022-01-12 DIAGNOSIS — M25.552 LEFT HIP PAIN: ICD-10-CM

## 2022-01-12 PROCEDURE — 73502 X-RAY EXAM HIP UNI 2-3 VIEWS: CPT

## 2022-06-06 PROBLEM — K51.90 MILD CHRONIC ULCERATIVE COLITIS (HCC): Status: ACTIVE | Noted: 2022-06-06

## 2022-06-06 RX ORDER — CETIRIZINE HYDROCHLORIDE 10 MG/1
10 TABLET ORAL ONCE
Status: CANCELLED
Start: 2022-06-09 | End: 2022-06-09

## 2022-06-06 RX ORDER — ACETAMINOPHEN 500 MG
1000 TABLET ORAL ONCE
Status: CANCELLED | OUTPATIENT
Start: 2022-06-09

## 2022-06-09 ENCOUNTER — INFUSION (OUTPATIENT)
Dept: ONCOLOGY | Facility: HOSPITAL | Age: 47
End: 2022-06-09

## 2022-06-09 VITALS
RESPIRATION RATE: 16 BRPM | WEIGHT: 243 LBS | DIASTOLIC BLOOD PRESSURE: 90 MMHG | SYSTOLIC BLOOD PRESSURE: 135 MMHG | HEART RATE: 106 BPM | TEMPERATURE: 97.4 F | BODY MASS INDEX: 40.44 KG/M2

## 2022-06-09 DIAGNOSIS — K51.919 MILD CHRONIC ULCERATIVE COLITIS WITH COMPLICATION: Primary | ICD-10-CM

## 2022-06-09 PROCEDURE — 96365 THER/PROPH/DIAG IV INF INIT: CPT

## 2022-06-09 PROCEDURE — 25010000002 USTEKINUMAB 130 MG/26ML SOLUTION 26 ML VIAL: Performed by: COLON & RECTAL SURGERY

## 2022-06-09 RX ORDER — ACETAMINOPHEN 500 MG
1000 TABLET ORAL ONCE
Status: COMPLETED | OUTPATIENT
Start: 2022-06-09 | End: 2022-06-09

## 2022-06-09 RX ORDER — ACETAMINOPHEN 500 MG
1000 TABLET ORAL ONCE
Status: CANCELLED | OUTPATIENT
Start: 2022-06-09

## 2022-06-09 RX ORDER — CETIRIZINE HYDROCHLORIDE 10 MG/1
10 TABLET ORAL ONCE
Status: COMPLETED | OUTPATIENT
Start: 2022-06-09 | End: 2022-06-09

## 2022-06-09 RX ORDER — CETIRIZINE HYDROCHLORIDE 10 MG/1
10 TABLET ORAL ONCE
Status: CANCELLED
Start: 2022-06-09 | End: 2022-06-09

## 2022-06-09 RX ADMIN — USTEKINUMAB 520 MG: 130 SOLUTION INTRAVENOUS at 11:30

## 2022-06-09 RX ADMIN — ACETAMINOPHEN 1000 MG: 500 TABLET ORAL at 11:20

## 2022-06-09 RX ADMIN — CETIRIZINE HYDROCHLORIDE 10 MG: 10 TABLET, FILM COATED ORAL at 11:20

## 2022-10-11 ENCOUNTER — HOSPITAL ENCOUNTER (EMERGENCY)
Facility: HOSPITAL | Age: 47
Discharge: HOME | End: 2022-10-11
Attending: EMERGENCY MEDICINE
Payer: MEDICAID

## 2022-10-11 VITALS
WEIGHT: 240 LBS | BODY MASS INDEX: 39.99 KG/M2 | HEART RATE: 87 BPM | HEIGHT: 65 IN | RESPIRATION RATE: 20 BRPM | DIASTOLIC BLOOD PRESSURE: 72 MMHG | TEMPERATURE: 98.9 F | OXYGEN SATURATION: 96 % | SYSTOLIC BLOOD PRESSURE: 125 MMHG

## 2022-10-11 DIAGNOSIS — U07.1 COVID-19: Primary | ICD-10-CM

## 2022-10-11 LAB
RAPID INFLUENZA  B AGN: NEGATIVE
RAPID INFLUENZA A AGN: NEGATIVE
S PYO AG THROAT QL: NEGATIVE
SARS-COV-2, NAAT: DETECTED

## 2022-10-11 PROCEDURE — 87635 SARS-COV-2 COVID-19 AMP PRB: CPT

## 2022-10-11 PROCEDURE — 87880 STREP A ASSAY W/OPTIC: CPT

## 2022-10-11 PROCEDURE — 87804 INFLUENZA ASSAY W/OPTIC: CPT

## 2022-10-11 PROCEDURE — 99283 EMERGENCY DEPT VISIT LOW MDM: CPT

## 2022-10-11 RX ORDER — CELECOXIB 200 MG/1
200 CAPSULE ORAL DAILY
COMMUNITY

## 2022-10-11 RX ORDER — NIRMATRELVIR AND RITONAVIR 300-100 MG
KIT ORAL
Qty: 30 TABLET | Refills: 0 | Status: SHIPPED | OUTPATIENT
Start: 2022-10-11 | End: 2022-10-16

## 2022-10-11 RX ORDER — ATORVASTATIN CALCIUM 20 MG/1
20 TABLET, FILM COATED ORAL DAILY
COMMUNITY

## 2022-10-11 RX ORDER — USTEKINUMAB 90 MG/ML
90 INJECTION, SOLUTION SUBCUTANEOUS
COMMUNITY

## 2022-10-11 RX ORDER — AMLODIPINE BESYLATE 5 MG/1
5 TABLET ORAL DAILY
COMMUNITY

## 2022-10-11 ASSESSMENT — ENCOUNTER SYMPTOMS
SINUS PRESSURE: 0
EYE PAIN: 0
SORE THROAT: 1
DIARRHEA: 0
BACK PAIN: 0
VOMITING: 0
CHEST TIGHTNESS: 0
SHORTNESS OF BREATH: 0
STRIDOR: 0
ABDOMINAL PAIN: 0
WHEEZING: 0
EYE REDNESS: 0
NAUSEA: 0
RHINORRHEA: 1
COUGH: 1
PHOTOPHOBIA: 0

## 2022-10-11 NOTE — ED PROVIDER NOTES
[facility] ENCOUNTER      Pt Name: Michaela King  MRN: [id number]  [place] 1975  Date of evaluation: 10/11/2022  Provider: Daxa Oliveira MD    CHIEF COMPLAINT       Chief Complaint   Patient presents with    Concern For COVID-19    Cough    Pharyngitis    Generalized Body Aches         HISTORY OF PRESENT ILLNESS   (Location/Symptom, Timing/Onset, Context/Setting, Quality, Duration, Modifying Factors, Severity)  Note limiting factors. Michaela King is a 56 y.o. female who presents to the emergency department with nonproductive cough, sore throat, body aches, chills, congestion, runny nose since earlier today. Patient denies fever, chills, shortness of breath. No recent travel, no recent exposure to sick contacts with    The history is provided by the patient. No  was used. Nursing Notes were reviewed. REVIEW OF SYSTEMS    (2-9 systems for level 4, 10 or more for level 5)     Review of Systems   Constitutional:  Positive for appetite change, chills, diaphoresis, fatigue and fever. Negative for unexpected weight change. HENT:  Positive for rhinorrhea and sore throat. Negative for dental problem, ear discharge, ear pain, hearing loss, mouth sores, nosebleeds, sinus pressure and sneezing. Eyes:  Negative for photophobia, pain and redness. Respiratory:  Positive for cough (nonproductive). Negative for chest tightness, shortness of breath, wheezing and stridor. Cardiovascular:  Negative for chest pain and leg swelling. Gastrointestinal:  Negative for abdominal pain, diarrhea, nausea and vomiting. Endocrine: Negative for cold intolerance and heat intolerance. Genitourinary:  Negative for difficulty urinating. Musculoskeletal:  Negative for arthralgias and back pain. Skin:  Negative for pallor and rash.    Neurological:  Negative for dizziness, seizures, speech difficulty, weakness, numbness and headaches. Hematological:  Negative for adenopathy. Psychiatric/Behavioral:  Negative for agitation, self-injury, sleep disturbance and suicidal ideas. The patient is not nervous/anxious. All other systems reviewed and are negative. Except as noted above the remainder of the review of systems was reviewed and negative. PAST MEDICAL HISTORY     Past Medical History:   Diagnosis Date    Anxiety     Arthritis     Depression     Fibromyalgia     Hypertension     Panic attacks     Ulcerative colitis ([address]          SURGICAL HISTORY       Past Surgical History:   Procedure Laterality Date    CARPAL TUNNEL RELEASE      HYSTERECTOMY (CERVIX STATUS UNKNOWN)      SKIN CANCER EXCISION           CURRENT MEDICATIONS       Discharge Medication List as of 10/11/2022  2:45 AM        CONTINUE these medications which have NOT CHANGED    Details   ustekinumab (STELARA) 90 MG/ML SOSY prefilled syringe Inject 90 mg into the skin Every 2 monthsHistorical Med      celecoxib (CELEBREX) 200 MG capsule Take 200 mg by mouth dailyHistorical Med      atorvastatin (LIPITOR) 20 MG tablet Take 20 mg by mouth dailyHistorical Med      amLODIPine (NORVASC) 5 MG tablet Take 5 mg by mouth dailyHistorical Med      divalproex (DEPAKOTE ER) 250 MG extended release tablet Take 500 mg by mouth dailyHistorical Med      DULoxetine (CYMBALTA) 30 MG extended release capsule Take 30 mg by mouth dailyHistorical Med      ibuprofen (ADVIL;MOTRIN) 800 MG tablet Take 1 tablet by mouth every 6 hours as needed for Pain, Disp-30 tablet, R-0Print      mesalamine (LIALDA) 1.2 g EC tablet Take 3,600 mg by mouth daily (with breakfast)Historical Med      estradiol (ESTRACE) 1 MG tablet Take 1 mg by mouth dailyHistorical Med      triamterene-hydrochlorothiazide (MAXZIDE-25) 37.5-25 MG per tablet Take 1 tablet by mouth dailyHistorical Med             ALLERGIES     Pcn [penicillins], Morphine, and Tetracyclines & related    FAMILY HISTORY     History reviewed.  No pertinent family history. SOCIAL HISTORY       Social History     Socioeconomic History    Marital status:      Spouse name: None    Number of children: None    Years of education: None    Highest education level: None   Tobacco Use    Smoking status: Every Day     Types: E-Cigarettes    Smokeless tobacco: Never   Vaping Use    Vaping Use: Every day    Substances: Always   Substance and Sexual Activity    Alcohol use: Never    Drug use: Never       SCREENINGS         Kell Coma Scale  Eye Opening: Spontaneous  Best Verbal Response: Oriented  Best Motor Response: Obeys commands  Altagracia Coma Scale Score: 15                     CIWA Assessment  BP: 125/72  Heart Rate: 87                 PHYSICAL EXAM    (up to 7 for level 4, 8 or more for level 5)     ED Triage Vitals [10/11/22 0048]   BP Temp Temp Source Heart Rate Resp SpO2 Height Weight   (!) 147/94 98.9 °F (37.2 °C) Oral 93 20 98 % 5' 5\" (1.651 m) 240 lb (108.9 kg)       Physical Exam  Vitals and nursing note reviewed. Constitutional:       General: She is not in acute distress. Appearance: Normal appearance. She is well-developed and normal weight. She is not ill-appearing, toxic-appearing or diaphoretic. HENT:      Head: Normocephalic and atraumatic. Right Ear: Tympanic membrane and ear canal normal.      Left Ear: Tympanic membrane and ear canal normal.      Nose: Congestion and rhinorrhea present. Mouth/Throat:      Mouth: Mucous membranes are moist.      Pharynx: Uvula midline. Posterior oropharyngeal erythema present. Eyes:      Extraocular Movements: Extraocular movements intact. Conjunctiva/sclera: Conjunctivae normal.      Pupils: Pupils are equal, round, and reactive to light. Cardiovascular:      Rate and Rhythm: Normal rate and regular rhythm. Pulses: Normal pulses. Heart sounds: Normal heart sounds. No murmur heard. Pulmonary:      Effort: Pulmonary effort is normal. No respiratory distress. Breath sounds: Normal breath sounds. No stridor. No wheezing or rhonchi. Abdominal:      General: Abdomen is flat. Bowel sounds are normal.      Palpations: Abdomen is soft. Musculoskeletal:         General: Normal range of motion. Cervical back: Normal range of motion and neck supple. No rigidity or tenderness. Skin:     General: Skin is warm and dry. Capillary Refill: Capillary refill takes 2 to 3 seconds. Neurological:      General: No focal deficit present. Mental Status: She is alert and oriented to person, place, and time. Mental status is at baseline. Psychiatric:         Mood and Affect: Mood normal.         Behavior: Behavior normal.         Thought Content: Thought content normal.         Judgment: Judgment normal.       DIAGNOSTIC RESULTS     EKG: All EKG's are interpreted by the Emergency Department Physician who either signs or Co-signs this chart in the absence of a cardiologist.        RADIOLOGY:   Non-plain film images such as CT, Ultrasound and MRI are read by the radiologist. Plain radiographic images are visualized and preliminarily interpreted by the emergency physician with the below findings:        Interpretation per the Radiologist below, if available at the time of this note:    No orders to display         ED BEDSIDE ULTRASOUND:   Performed by ED Physician - none    LABS:  Labs Reviewed   COVID-19, RAPID - Abnormal; Notable for the following components:       Result Value    SARS-CoV-2, NAAT DETECTED (*)     All other components within normal limits    Narrative:     CALL  [place]  [facility] [place] [phone],  COVID RESULTS CALLED TO AND READ BACK BY Nicki Palacios, 10/11/2022 01:28,  by [facility]   RAPID INFLUENZA A/B ANTIGENS   STREP SCREEN GROUP A THROAT       All other labs were within normal range or not returned as of this dictation.     EMERGENCY DEPARTMENT COURSE and DIFFERENTIAL DIAGNOSIS/MDM:   Vitals:    Vitals:    10/11/22 0115 10/11/22 0130 10/11/22 0145 10/11/22 0200   BP: (!) 139/90 138/87 134/89 125/72   Pulse: 87      Resp:       Temp:       TempSrc:       SpO2: 96% 97% 97% 96%   Weight:       Height:               MDM        REASSESSMENT      Patient is clearly in mild to moderate symptoms but at the same time appears to fit high risk patient category. She is currently on Stelara for ulcerative colitis, and she is, for this reason, immunosuppressed. Patient appears to be a great candidate for Paxlovid. And benefits discussed, patient understands that this medication only has emergency authorization by FDA but she is willing to take it. She will be careful to report any possible side effects. CRITICAL CARE TIME   Total Critical Care time was 0 minutes, excluding separately reportable procedures. There was a high probability of clinically significant/life threatening deterioration in the patient's condition which required my urgent intervention. CONSULTS:  None    PROCEDURES:  Unless otherwise noted below, none     Procedures        FINAL IMPRESSION      1. COVID-19 Stable         DISPOSITION/PLAN   DISPOSITION Decision To Discharge 10/11/2022 01:45:53 AM      [facility]  [phone]    Schedule an appointment as soon as possible for a visit in 2 days  As needed, If symptoms worsen    [facility] Emergency Department  [facility] [address] [facility]  [phone]    If unable to see PCP timely      DISCHARGE MEDICATIONS:  Discharge Medication List as of 10/11/2022  2:45 AM        START taking these medications    Details   nirmatrelvir/ritonavir (PAXLOVID, 300/100,) 20 x 150 MG & 10 x 100MG TBPK Take 3 tablets (two 150 mg nirmatrelvir and one 100 mg ritonavir tablets) by mouth every 12 hours for 5 days. , Disp-30 tablet, R-0Normal           Controlled Substances Monitoring:     No flowsheet data found.     (Please note that portions of this note were completed with a voice recognition program.  Efforts were made to edit the dictations but occasionally words are mis-transcribed.)    Anupam Lo MD (electronically signed)  Attending Emergency Physician           Anupam Lo MD  10/11/22 [id number]

## 2022-10-11 NOTE — DISCHARGE INSTRUCTIONS
Jenny quarantine for 5 days, take OTC Tylenol as needed for pain / fever control. Lots of fluids (water). Take the medication prescribed as directed.     If any new associated symptoms or worsening of current presentation please go to the closest urgent care or emergency room for reevaluation

## 2023-01-11 ENCOUNTER — HOSPITAL ENCOUNTER (OUTPATIENT)
Facility: HOSPITAL | Age: 48
Discharge: HOME | End: 2023-01-11
Payer: MEDICAID

## 2023-01-11 LAB
A/G RATIO: 1.5 (ref 0.8–2)
ALBUMIN SERPL-MCNC: 4.1 G/DL (ref 3.4–4.8)
ALP BLD-CCNC: 128 U/L (ref 25–100)
ALT SERPL-CCNC: 28 U/L (ref 4–36)
ANION GAP SERPL CALCULATED.3IONS-SCNC: 10 MMOL/L (ref 3–16)
AST SERPL-CCNC: 18 U/L (ref 8–33)
BASOPHILS ABSOLUTE: 0.1 K/UL (ref 0–0.1)
BASOPHILS RELATIVE PERCENT: 0.8 %
BILIRUB SERPL-MCNC: 0.4 MG/DL (ref 0.3–1.2)
BUN BLDV-MCNC: 18 MG/DL (ref 6–20)
C-REACTIVE PROTEIN: 8.3 MG/L (ref 0–5.1)
CALCIUM SERPL-MCNC: 9.3 MG/DL (ref 8.5–10.5)
CHLORIDE BLD-SCNC: 101 MMOL/L (ref 98–107)
CO2: 28 MMOL/L (ref 20–30)
CREAT SERPL-MCNC: 0.6 MG/DL (ref 0.4–1.2)
EOSINOPHILS ABSOLUTE: 0.2 K/UL (ref 0–0.4)
EOSINOPHILS RELATIVE PERCENT: 2.3 %
GFR SERPL CREATININE-BSD FRML MDRD: >60 ML/MIN/{1.73_M2}
GLOBULIN: 2.7 G/DL
GLUCOSE BLD-MCNC: 138 MG/DL (ref 74–106)
HCT VFR BLD CALC: 41.9 % (ref 37–47)
HEMOGLOBIN: 14 G/DL (ref 11.5–16.5)
IMMATURE GRANULOCYTES #: 0.1 K/UL
IMMATURE GRANULOCYTES %: 1.1 % (ref 0–5)
LYMPHOCYTES ABSOLUTE: 1.3 K/UL (ref 1.5–4)
LYMPHOCYTES RELATIVE PERCENT: 19.4 %
MCH RBC QN AUTO: 30.3 PG (ref 27–32)
MCHC RBC AUTO-ENTMCNC: 33.4 G/DL (ref 31–35)
MCV RBC AUTO: 90.7 FL (ref 80–100)
MONOCYTES ABSOLUTE: 0.3 K/UL (ref 0.2–0.8)
MONOCYTES RELATIVE PERCENT: 4.1 %
NEUTROPHILS ABSOLUTE: 4.7 K/UL (ref 2–7.5)
NEUTROPHILS RELATIVE PERCENT: 72.3 %
PDW BLD-RTO: 12.8 % (ref 11–16)
PLATELET # BLD: 216 K/UL (ref 150–400)
PMV BLD AUTO: 9.7 FL (ref 6–10)
POTASSIUM SERPL-SCNC: 4.3 MMOL/L (ref 3.4–5.1)
RBC # BLD: 4.62 M/UL (ref 3.8–5.8)
SODIUM BLD-SCNC: 139 MMOL/L (ref 136–145)
TOTAL PROTEIN: 6.8 G/DL (ref 6.4–8.3)
WBC # BLD: 6.6 K/UL (ref 4–11)

## 2023-01-11 PROCEDURE — 80053 COMPREHEN METABOLIC PANEL: CPT

## 2023-01-11 PROCEDURE — 36415 COLL VENOUS BLD VENIPUNCTURE: CPT

## 2023-01-11 PROCEDURE — 86140 C-REACTIVE PROTEIN: CPT

## 2023-01-11 PROCEDURE — 85025 COMPLETE CBC W/AUTO DIFF WBC: CPT

## 2023-01-13 ENCOUNTER — TRANSCRIBE ORDERS (OUTPATIENT)
Dept: ADMINISTRATIVE | Facility: HOSPITAL | Age: 48
End: 2023-01-13
Payer: MEDICAID

## 2023-01-13 DIAGNOSIS — R74.8 ALKALINE PHOSPHATASE ELEVATION: Primary | ICD-10-CM

## 2023-01-25 ENCOUNTER — HOSPITAL ENCOUNTER (OUTPATIENT)
Dept: ULTRASOUND IMAGING | Facility: HOSPITAL | Age: 48
Discharge: HOME | End: 2023-01-25
Admitting: INTERNAL MEDICINE
Payer: MEDICAID

## 2023-01-25 DIAGNOSIS — R74.8 ALKALINE PHOSPHATASE ELEVATION: ICD-10-CM

## 2023-01-25 PROCEDURE — 76705 ECHO EXAM OF ABDOMEN: CPT

## 2023-05-08 ENCOUNTER — HOSPITAL ENCOUNTER (OUTPATIENT)
Dept: GENERAL RADIOLOGY | Facility: HOSPITAL | Age: 48
Discharge: HOME | End: 2023-05-08
Payer: MEDICAID

## 2023-05-08 ENCOUNTER — HOSPITAL ENCOUNTER (OUTPATIENT)
Facility: HOSPITAL | Age: 48
Discharge: HOME | End: 2023-05-08
Payer: MEDICAID

## 2023-05-08 DIAGNOSIS — M25.571 ACUTE RIGHT ANKLE PAIN: ICD-10-CM

## 2023-05-08 DIAGNOSIS — M25.572 ACUTE LEFT ANKLE PAIN: ICD-10-CM

## 2023-05-08 PROCEDURE — 73610 X-RAY EXAM OF ANKLE: CPT

## 2023-06-26 ENCOUNTER — HOSPITAL ENCOUNTER (OUTPATIENT)
Facility: HOSPITAL | Age: 48
Discharge: HOME | End: 2023-06-26
Payer: MEDICAID

## 2023-06-26 PROCEDURE — 86480 TB TEST CELL IMMUN MEASURE: CPT

## 2023-06-29 LAB
GAMMA INTERFERON BACKGROUND BLD IA-ACNC: 0.02 IU/ML
MITOGEN IGNF BCKGRD COR BLD-ACNC: >10 IU/ML
QUANTI TB GOLD PLUS: NEGATIVE
QUANTI TB1 MINUS NIL: 0 IU/ML (ref 0–0.34)
QUANTI TB2 MINUS NIL: 0 IU/ML (ref 0–0.34)

## 2023-09-12 ENCOUNTER — OFFICE VISIT (OUTPATIENT)
Dept: PULMONOLOGY | Facility: CLINIC | Age: 48
End: 2023-09-12
Payer: MEDICAID

## 2023-09-12 ENCOUNTER — PATIENT ROUNDING (BHMG ONLY) (OUTPATIENT)
Dept: PULMONOLOGY | Facility: CLINIC | Age: 48
End: 2023-09-12
Payer: MEDICAID

## 2023-09-12 VITALS
DIASTOLIC BLOOD PRESSURE: 78 MMHG | OXYGEN SATURATION: 96 % | HEART RATE: 106 BPM | HEIGHT: 65 IN | RESPIRATION RATE: 16 BRPM | BODY MASS INDEX: 40.15 KG/M2 | SYSTOLIC BLOOD PRESSURE: 124 MMHG | WEIGHT: 241 LBS

## 2023-09-12 DIAGNOSIS — G47.33 OBSTRUCTIVE SLEEP APNEA: Primary | ICD-10-CM

## 2023-09-12 DIAGNOSIS — E66.01 MORBID OBESITY, UNSPECIFIED OBESITY TYPE: ICD-10-CM

## 2023-09-12 DIAGNOSIS — F17.290 VAPING NICOTINE DEPENDENCE, TOBACCO PRODUCT: ICD-10-CM

## 2023-09-12 DIAGNOSIS — G47.19 EXCESSIVE DAYTIME SLEEPINESS: ICD-10-CM

## 2023-09-12 DIAGNOSIS — R06.83 SNORING: ICD-10-CM

## 2023-09-12 DIAGNOSIS — G25.81 RESTLESS LEG SYNDROME: ICD-10-CM

## 2023-09-12 RX ORDER — MELOXICAM 15 MG/1
15 TABLET ORAL DAILY
COMMUNITY

## 2023-09-12 RX ORDER — GLYCOPYRROLATE 1 MG/1
1 TABLET ORAL 3 TIMES DAILY
COMMUNITY

## 2023-09-12 RX ORDER — GLUCOSAMINE HCL 500 MG
2000 TABLET ORAL
COMMUNITY

## 2023-09-12 RX ORDER — CELECOXIB 200 MG/1
200 CAPSULE ORAL DAILY
COMMUNITY

## 2023-09-12 RX ORDER — SEMAGLUTIDE 1.34 MG/ML
INJECTION, SOLUTION SUBCUTANEOUS WEEKLY
COMMUNITY

## 2023-09-12 RX ORDER — MIRTAZAPINE 15 MG/1
15 TABLET, FILM COATED ORAL NIGHTLY
COMMUNITY

## 2023-09-12 RX ORDER — HYDROXYZINE HYDROCHLORIDE 10 MG/1
10 TABLET, FILM COATED ORAL 3 TIMES DAILY PRN
COMMUNITY

## 2023-09-12 RX ORDER — CYCLOBENZAPRINE HCL 5 MG
5 TABLET ORAL 3 TIMES DAILY PRN
COMMUNITY

## 2023-09-12 RX ORDER — ROPINIROLE 1 MG/1
1 TABLET, FILM COATED ORAL NIGHTLY
Qty: 30 TABLET | Refills: 5 | Status: SHIPPED | OUTPATIENT
Start: 2023-09-12

## 2023-09-12 RX ORDER — HYDROCHLOROTHIAZIDE 25 MG/1
25 TABLET ORAL DAILY
COMMUNITY

## 2023-09-12 NOTE — PROGRESS NOTES
"  CONSULT NOTE    Requested by:   Delmi Li APRN Luttrell, Rebecca, APRN      Chief Complaint   Patient presents with    Sleeping Problem    Consult       Subjective:  April Judy Nicolas is a 48 y.o. female.   Patient comes in today for consultation because of sleep apnea.  The patient says that  she was diagnosed with sleep apnea 6 years ago.      It appears that she has been on a PAP device since then.    Although she does not feel that all her symptoms from before the diagnosis being established have returned, she is noticing renewed tendency to feel sleepy while watching TV & reading a book. she is complaining of occasional headaches.    Patient also complains of an urge to move her legs along with occasional tingling sensation. Patient also reports occasions when she gets \"cramps\" and has to rub them off and sometimes walk them off.    Patient vapes nicotine containing substances.       The following portions of the patient's history were reviewed and updated as appropriate: allergies, current medications, past family history, past medical history, past social history, and past surgical history.    Review of Systems   Constitutional:  Negative for chills, fatigue and fever.   HENT:  Negative for sinus pressure, sneezing and sore throat.    Respiratory:  Negative for cough, chest tightness, shortness of breath and wheezing.    Psychiatric/Behavioral:  Positive for sleep disturbance.    All other systems reviewed and are negative.    Past Medical History:   Diagnosis Date    Anxiety and depression     Arthritis     Body piercing     EARS BILATERAL    Bursitis     Chest pain unsure of year    WITH EXCEDRINE TOO MUCH CAFFEINE.  LAST EPISODE WAS 2/2020.  PT WENT TO E.R.  STATED F/U WITH DR LEAL AND WAS TOLD THAT IT WAS DUE TO ACID REFLUX.     Diarrhea     Elevated cholesterol     Fibromyalgia     GERD (gastroesophageal reflux disease)     Hernia, umbilical     High cholesterol     Hip pain, acute, " "right     History of nuclear stress test 2014    Hypertension     IBS (irritable bowel syndrome)     Knee pain, bilateral     Malignant melanoma     stage 1, left leg    Migraines     OCD (obsessive compulsive disorder)     PTSD (post-traumatic stress disorder)     Sleep apnea     CPAP USAGE    Ulcerative colitis     Wears glasses        Social History     Tobacco Use    Smoking status: Former     Packs/day: 1.00     Years: 9.00     Pack years: 9.00     Types: Cigarettes     Quit date: 8/24/2020     Years since quitting: 3.0    Smokeless tobacco: Never    Tobacco comments:     vaping, quit smoking August 2020    Substance Use Topics    Alcohol use: No         Objective:  Visit Vitals  /78   Pulse 106   Resp 16   Ht 165.1 cm (65\") Comment: pt reported   Wt 109 kg (241 lb)   SpO2 96%   BMI 40.10 kg/m²       Physical Exam  Vitals reviewed.   Constitutional:       Appearance: She is well-developed.   HENT:      Head: Atraumatic.      Mouth/Throat:      Comments: Oropharynx was crowded.  Neck:      Comments: Increased neck circumference noted.  Cardiovascular:      Rate and Rhythm: Normal rate and regular rhythm.      Heart sounds: Normal heart sounds. No murmur heard.  Pulmonary:      Effort: Pulmonary effort is normal. No respiratory distress.      Breath sounds: Normal breath sounds. No wheezing or rales.   Musculoskeletal:      Comments: Gait was normal.   Neurological:      Mental Status: She is alert and oriented to person, place, and time.         Assessment/Plan:  Diagnoses and all orders for this visit:    1. Obstructive sleep apnea (Primary)  -     BIPAP / CPAP Adjustment    2. Excessive daytime sleepiness  -     BIPAP / CPAP Adjustment    3. Snoring  -     BIPAP / CPAP Adjustment    4. Morbid obesity, unspecified obesity type  -     BIPAP / CPAP Adjustment    5. Vaping nicotine dependence, tobacco product    6. Restless leg syndrome    Other orders  -     rOPINIRole (REQUIP) 1 MG tablet; Take 1 tablet by " mouth Every Night. Take 1 hour before bedtime.  Dispense: 30 tablet; Refill: 5        Return in about 6 months (around 3/12/2024) for SleepONLY/Fatimha.    DISCUSSION(if any):  Laboratory workup was also reviewed which showed   Lab Results   Component Value Date    CO2 27.8 09/24/2020     Laboratory workup also showed   Lab Results   Component Value Date    HGB 14.0 01/11/2023    HGB 14.8 09/24/2020    HGB 14.8 02/18/2020   ,   Lab Results   Component Value Date    HCT 41.9 01/11/2023    HCT 43.3 09/24/2020    HCT 43.2 02/18/2020   ,   Lab Results   Component Value Date    TSH 2.300 03/02/2018    TSH 0.896 05/31/2017    TSH 1.810 11/29/2016     Referring physicians office note was also reviewed that did mention sleep apnea    ===========================  ===========================    I was able to review the results of last in-lab sleep study in detail. It was performed in Jan 2017. I informed her that the apnea hypopnea index was 6/hour. Supine AHI was 7/hour.      Current PAP Settings: 6/16  Current mask: Nasal mask    I told the patient that her symptoms are consistent with poorly controlled sleep apnea.    Patient was advised to continue using PAP for at least 4 hours every night.    I have decided to empirically change her to a new AutoPAP at a pressure of 14/20.    If the symptoms do not improve, even after above-mentioned measures, then she may have to undergo a full night titration study.    Patient was advised to call this office with any issues.    Weight loss was also discussed and advised.    Patient's last CBC excludes significant iron deficiency causing anemia, as a potential contributor to symptoms of RLS     We will start the patient on Requip and adjust the dose according to symptomatic relief.    she was informed about the side effects in great detail.    Vaping cessation was advised and discussed.     Patient was offered modalities such as Chantix/nicotine patches/Wellbutrin to aid in smoking  cessation.    Patient was given reading material.        Dictated utilizing Dragon dictation.    This document was electronically signed by Sheree Gonzalez MD on 09/12/23 at 14:45 EDT

## 2024-01-23 ENCOUNTER — HOSPITAL ENCOUNTER (OUTPATIENT)
Facility: HOSPITAL | Age: 49
Discharge: HOME | End: 2024-01-23
Payer: MEDICAID

## 2024-01-23 LAB
ALBUMIN SERPL-MCNC: 4.1 G/DL (ref 3.4–4.8)
ALBUMIN/GLOB SERPL: 1.6 {RATIO} (ref 0.8–2)
ALP SERPL-CCNC: 116 U/L (ref 25–100)
ALT SERPL-CCNC: 49 U/L (ref 4–36)
ANION GAP SERPL CALCULATED.3IONS-SCNC: 10 MMOL/L (ref 3–16)
AST SERPL-CCNC: 44 U/L (ref 8–33)
BASOPHILS # BLD: 0 K/UL (ref 0–0.1)
BASOPHILS NFR BLD: 0.6 %
BILIRUB SERPL-MCNC: 0.3 MG/DL (ref 0.3–1.2)
BUN SERPL-MCNC: 13 MG/DL (ref 6–20)
CALCIUM SERPL-MCNC: 9.4 MG/DL (ref 8.5–10.5)
CHLORIDE SERPL-SCNC: 102 MMOL/L (ref 98–107)
CO2 SERPL-SCNC: 27 MMOL/L (ref 20–30)
CREAT SERPL-MCNC: 0.7 MG/DL (ref 0.4–1.2)
CRP SERPL-MCNC: 4.6 MG/L (ref 0–5.1)
EOSINOPHIL # BLD: 0.1 K/UL (ref 0–0.4)
EOSINOPHIL NFR BLD: 1.7 %
ERYTHROCYTE [DISTWIDTH] IN BLOOD BY AUTOMATED COUNT: 13.2 % (ref 11–16)
GFR SERPLBLD CREATININE-BSD FMLA CKD-EPI: >60 ML/MIN/{1.73_M2}
GLOBULIN SER CALC-MCNC: 2.5 G/DL
GLUCOSE SERPL-MCNC: 130 MG/DL (ref 74–106)
HCT VFR BLD AUTO: 40 % (ref 37–47)
HGB BLD-MCNC: 13.6 G/DL (ref 11.5–16.5)
IMM GRANULOCYTES # BLD: 0 K/UL
IMM GRANULOCYTES NFR BLD: 0.3 % (ref 0–5)
LYMPHOCYTES # BLD: 1.3 K/UL (ref 1.5–4)
LYMPHOCYTES NFR BLD: 18 %
MCH RBC QN AUTO: 30.6 PG (ref 27–32)
MCHC RBC AUTO-ENTMCNC: 34 G/DL (ref 31–35)
MCV RBC AUTO: 89.9 FL (ref 80–100)
MONOCYTES # BLD: 0.3 K/UL (ref 0.2–0.8)
MONOCYTES NFR BLD: 4.7 %
NEUTROPHILS # BLD: 5.4 K/UL (ref 2–7.5)
NEUTS SEG NFR BLD: 74.7 %
PLATELET # BLD AUTO: 239 K/UL (ref 150–400)
PMV BLD AUTO: 9.6 FL (ref 6–10)
POTASSIUM SERPL-SCNC: 4.2 MMOL/L (ref 3.4–5.1)
PROT SERPL-MCNC: 6.6 G/DL (ref 6.4–8.3)
RBC # BLD AUTO: 4.45 M/UL (ref 3.8–5.8)
SODIUM SERPL-SCNC: 139 MMOL/L (ref 136–145)
WBC # BLD AUTO: 7.2 K/UL (ref 4–11)

## 2024-01-23 PROCEDURE — 85025 COMPLETE CBC W/AUTO DIFF WBC: CPT

## 2024-01-23 PROCEDURE — 36415 COLL VENOUS BLD VENIPUNCTURE: CPT

## 2024-01-23 PROCEDURE — 86140 C-REACTIVE PROTEIN: CPT

## 2024-01-23 PROCEDURE — 80053 COMPREHEN METABOLIC PANEL: CPT

## 2024-02-02 ENCOUNTER — TRANSCRIBE ORDERS (OUTPATIENT)
Dept: ADMINISTRATIVE | Facility: HOSPITAL | Age: 49
End: 2024-02-02
Payer: MEDICAID

## 2024-02-02 DIAGNOSIS — R74.8 LIVER ENZYME ELEVATION: Primary | ICD-10-CM

## 2024-02-07 ENCOUNTER — HOSPITAL ENCOUNTER (OUTPATIENT)
Facility: HOSPITAL | Age: 49
Discharge: HOME | End: 2024-02-07
Payer: MEDICAID

## 2024-02-07 ENCOUNTER — HOSPITAL ENCOUNTER (OUTPATIENT)
Dept: ULTRASOUND IMAGING | Facility: HOSPITAL | Age: 49
Discharge: HOME | End: 2024-02-07
Payer: MEDICAID

## 2024-02-07 DIAGNOSIS — R74.8 ACID PHOSPHATASE ELEVATED: ICD-10-CM

## 2024-02-07 LAB
ALBUMIN SERPL-MCNC: 4 G/DL (ref 3.4–4.8)
ALP SERPL-CCNC: 123 U/L (ref 25–100)
ALT SERPL-CCNC: 41 U/L (ref 4–36)
AST SERPL-CCNC: 31 U/L (ref 8–33)
BILIRUB DIRECT SERPL-MCNC: <0.2 MG/DL (ref 0–0.2)
BILIRUB INDIRECT SERPL-MCNC: ABNORMAL MG/DL (ref 0.2–0.8)
BILIRUB SERPL-MCNC: 0.3 MG/DL (ref 0.3–1.2)
CHOLEST SERPL-MCNC: 165 MG/DL (ref 0–200)
HBV CORE IGM SERPL QL IA: NORMAL
HBV SURFACE AG SERPL QL IA: NORMAL
HCV AB SERPL QL IA: NORMAL
HDLC SERPL-MCNC: 48 MG/DL (ref 40–60)
LDLC SERPL CALC-MCNC: 91 MG/DL
PROT SERPL-MCNC: 6.6 G/DL (ref 6.4–8.3)
TRIGL SERPL-MCNC: 131 MG/DL (ref 0–249)
VLDLC SERPL CALC-MCNC: 26 MG/DL

## 2024-02-07 PROCEDURE — 86015 ACTIN ANTIBODY EACH: CPT

## 2024-02-07 PROCEDURE — 86663 EPSTEIN-BARR ANTIBODY: CPT

## 2024-02-07 PROCEDURE — 80076 HEPATIC FUNCTION PANEL: CPT

## 2024-02-07 PROCEDURE — 86645 CMV ANTIBODY IGM: CPT

## 2024-02-07 PROCEDURE — 86664 EPSTEIN-BARR NUCLEAR ANTIGEN: CPT

## 2024-02-07 PROCEDURE — 86644 CMV ANTIBODY: CPT

## 2024-02-07 PROCEDURE — 86705 HEP B CORE ANTIBODY IGM: CPT

## 2024-02-07 PROCEDURE — 82390 ASSAY OF CERULOPLASMIN: CPT

## 2024-02-07 PROCEDURE — 76705 ECHO EXAM OF ABDOMEN: CPT

## 2024-02-07 PROCEDURE — 36415 COLL VENOUS BLD VENIPUNCTURE: CPT

## 2024-02-07 PROCEDURE — 87340 HEPATITIS B SURFACE AG IA: CPT

## 2024-02-07 PROCEDURE — 86665 EPSTEIN-BARR CAPSID VCA: CPT

## 2024-02-07 PROCEDURE — 86038 ANTINUCLEAR ANTIBODIES: CPT

## 2024-02-07 PROCEDURE — 86803 HEPATITIS C AB TEST: CPT

## 2024-02-07 PROCEDURE — 80061 LIPID PANEL: CPT

## 2024-02-08 LAB — ANA SER QL IA: NEGATIVE

## 2024-02-09 LAB
CERULOPLASMIN SERPL-MCNC: 30 MG/DL (ref 16–45)
CMV IGG SERPL IA-ACNC: 0.22 U/ML
CMV IGM SERPL IA-ACNC: <8 AU/ML
EBV EA-D IGG SER-ACNC: <5 U/ML (ref 0–10.9)
EBV NA IGG SER IA-ACNC: 116 U/ML (ref 0–21.9)
EBV VCA IGG SER IA-ACNC: 200 U/ML (ref 0–21.9)
EBV VCA IGM SER IA-ACNC: <10 U/ML (ref 0–43.9)
SMA IGG SER-ACNC: 3 UNITS (ref 0–19)

## 2024-03-14 ENCOUNTER — OFFICE VISIT (OUTPATIENT)
Dept: PULMONOLOGY | Facility: CLINIC | Age: 49
End: 2024-03-14
Payer: MEDICAID

## 2024-03-14 VITALS
WEIGHT: 221.6 LBS | BODY MASS INDEX: 36.92 KG/M2 | HEIGHT: 65 IN | RESPIRATION RATE: 18 BRPM | OXYGEN SATURATION: 97 % | SYSTOLIC BLOOD PRESSURE: 122 MMHG | HEART RATE: 105 BPM | DIASTOLIC BLOOD PRESSURE: 76 MMHG

## 2024-03-14 DIAGNOSIS — E66.01 MORBID OBESITY, UNSPECIFIED OBESITY TYPE: ICD-10-CM

## 2024-03-14 DIAGNOSIS — G47.33 OBSTRUCTIVE SLEEP APNEA: Primary | ICD-10-CM

## 2024-03-14 DIAGNOSIS — G47.19 EXCESSIVE DAYTIME SLEEPINESS: ICD-10-CM

## 2024-03-14 PROCEDURE — 3074F SYST BP LT 130 MM HG: CPT | Performed by: NURSE PRACTITIONER

## 2024-03-14 PROCEDURE — 99213 OFFICE O/P EST LOW 20 MIN: CPT | Performed by: NURSE PRACTITIONER

## 2024-03-14 PROCEDURE — 3078F DIAST BP <80 MM HG: CPT | Performed by: NURSE PRACTITIONER

## 2024-03-14 NOTE — PROGRESS NOTES
"Chief Complaint   Patient presents with    Sleeping Problem    Follow-up         Subjective   April Judy Nicolas is a 48 y.o. female.   Patient comes back today for follow up of Obstructive Sleep apnea.     Patient says that she is compliant with her device and using it regularly.    Patient's symptoms of sleep disturbance and daytime sleepiness have been helped greatly with the use of PAP device, as prescribed.  She feels the pressure is too high. She is using a nasal mask. She continues to feel tired most days.     She was not able to tolerate requip. It made her dizzy and sick to stomach.     Bedtime midnight OOB 10-11 am.     She continues to vape.     The following portions of the patient's history were reviewed and updated as appropriate: allergies, current medications, past family history, past medical history, past social history, and past surgical history.      Review of Systems   HENT:  Negative for sinus pressure, sneezing and sore throat.    Respiratory:  Negative for cough, chest tightness, shortness of breath and wheezing.    Psychiatric/Behavioral:  Positive for sleep disturbance.        Objective   Visit Vitals  /76   Pulse 105   Resp 18   Ht 165.1 cm (65\") Comment: pt reported   Wt 101 kg (221 lb 9.6 oz)   SpO2 97%   BMI 36.88 kg/m²         Physical Exam  Vitals reviewed.   Constitutional:       Appearance: She is well-developed.   HENT:      Head: Atraumatic.      Mouth/Throat:      Mouth: Mucous membranes are moist.      Comments: Crowded oropharynx.   Eyes:      Extraocular Movements: Extraocular movements intact.   Skin:     General: Skin is warm.   Neurological:      Mental Status: She is alert and oriented to person, place, and time.           Assessment & Plan   Diagnoses and all orders for this visit:    1. Obstructive sleep apnea (Primary)    2. Excessive daytime sleepiness    3. Morbid obesity, unspecified obesity type           Return in about 5 months (around 8/14/2024) for " Recheck, For Dr. Gonzalez, Sleep ONLY.    DISCUSSION (if any):  I was able to review the results of last in-lab sleep study in detail. It was performed in Jan 2017. I informed her that the apnea hypopnea index was 6/hour. Supine AHI was 7/hour.       New machine: Sept/October 2023    DME: [employer]    Continue treatment with PAP therapy at the current pressure.     I will ask the office staff to request a compliance from her DME.     She feels the pressure is too high but I will have to see compliance data to see what her pressure setting is currently and residual AHI's and leak before changes can be made.     Humidification setup, hose and mask care discussed.    Weight loss advised.    Use every night for at least 4 hours stressed.     She has been advised to stop vaping.       Dictated utilizing Dragon dictation.    This document was electronically signed by AVA Posada March 18, 2024  16:10 EDT

## 2024-03-16 ENCOUNTER — HOSPITAL ENCOUNTER (EMERGENCY)
Facility: HOSPITAL | Age: 49
Discharge: HOME | End: 2024-03-16
Attending: EMERGENCY MEDICINE
Payer: MEDICAID

## 2024-03-16 VITALS
HEART RATE: 92 BPM | TEMPERATURE: 98.6 F | OXYGEN SATURATION: 97 % | BODY MASS INDEX: 35.82 KG/M2 | RESPIRATION RATE: 16 BRPM | DIASTOLIC BLOOD PRESSURE: 83 MMHG | SYSTOLIC BLOOD PRESSURE: 130 MMHG | WEIGHT: 215 LBS | HEIGHT: 65 IN

## 2024-03-16 DIAGNOSIS — U07.1 COVID-19: Primary | ICD-10-CM

## 2024-03-16 DIAGNOSIS — J02.9 ACUTE PHARYNGITIS, UNSPECIFIED ETIOLOGY: ICD-10-CM

## 2024-03-16 DIAGNOSIS — R03.0 ELEVATED BLOOD PRESSURE READING: ICD-10-CM

## 2024-03-16 DIAGNOSIS — R52 BODY ACHES: ICD-10-CM

## 2024-03-16 LAB
FLUAV AG NPH QL: NEGATIVE
FLUBV AG NPH QL: NEGATIVE
S PYO AG THROAT QL: NEGATIVE
SARS-COV-2 RDRP RESP QL NAA+PROBE: DETECTED

## 2024-03-16 PROCEDURE — 87635 SARS-COV-2 COVID-19 AMP PRB: CPT

## 2024-03-16 PROCEDURE — 99284 EMERGENCY DEPT VISIT MOD MDM: CPT

## 2024-03-16 PROCEDURE — 87880 STREP A ASSAY W/OPTIC: CPT

## 2024-03-16 PROCEDURE — 87804 INFLUENZA ASSAY W/OPTIC: CPT

## 2024-03-16 PROCEDURE — 6360000002 HC RX W HCPCS: Performed by: EMERGENCY MEDICINE

## 2024-03-16 PROCEDURE — 96372 THER/PROPH/DIAG INJ SC/IM: CPT

## 2024-03-16 RX ORDER — KETOROLAC TROMETHAMINE 30 MG/ML
30 INJECTION, SOLUTION INTRAMUSCULAR; INTRAVENOUS ONCE
Status: COMPLETED | OUTPATIENT
Start: 2024-03-16 | End: 2024-03-16

## 2024-03-16 RX ORDER — DEXAMETHASONE 4 MG/1
10 TABLET ORAL ONCE
Status: COMPLETED | OUTPATIENT
Start: 2024-03-16 | End: 2024-03-16

## 2024-03-16 RX ORDER — ACETAMINOPHEN 500 MG
500 TABLET ORAL EVERY 8 HOURS PRN
Qty: 30 TABLET | Refills: 0 | Status: SHIPPED | OUTPATIENT
Start: 2024-03-16

## 2024-03-16 RX ORDER — IBUPROFEN 600 MG/1
600 TABLET ORAL EVERY 8 HOURS PRN
Qty: 30 TABLET | Refills: 0 | Status: SHIPPED | OUTPATIENT
Start: 2024-03-16

## 2024-03-16 RX ADMIN — KETOROLAC TROMETHAMINE 30 MG: 30 INJECTION, SOLUTION INTRAMUSCULAR; INTRAVENOUS at 13:21

## 2024-03-16 RX ADMIN — DEXAMETHASONE 10 MG: 4 TABLET ORAL at 13:21

## 2024-03-16 ASSESSMENT — PAIN SCALES - GENERAL: PAINLEVEL_OUTOF10: 3

## 2024-03-16 ASSESSMENT — LIFESTYLE VARIABLES
HOW OFTEN DO YOU HAVE A DRINK CONTAINING ALCOHOL: NEVER
HOW MANY STANDARD DRINKS CONTAINING ALCOHOL DO YOU HAVE ON A TYPICAL DAY: PATIENT DOES NOT DRINK

## 2024-03-16 NOTE — ED TRIAGE NOTES
Pt to ED with complaints of sore throat, headache, chills and nasal congestion. Pt is concerned for flu or covid.

## 2024-03-16 NOTE — ED PROVIDER NOTES
[facility]  EMERGENCY DEPARTMENT ENCOUNTER        Pt Name: Michaela Leon  MRN: [id number]  Birthdate 1975  Date of evaluation: 3/16/2024  Provider: Joselo Jacinto DO  PCP: Debbi Grijalva  Note Started: 1:14 PM EDT 3/16/24    CHIEF COMPLAINT       Chief Complaint   Patient presents with    Sore Throat    Nasal Congestion    Headache    Chills       HISTORY OF PRESENT ILLNESS: 1 or more Elements     History from : Patient    Limitations to history : None    Michaela Leon is a 48 y.o. female with past history of anxiety, arthritis, depression, fibromyalgia, hypertension, panic attacks, and ulcerative colitis who presents to the emergency department accompanied by family at the bedside for chief complaint of sore throat, nasal congestion, headache, and chills.  Patient states that she began experiencing symptoms last evening and she is concerned that she possibly has influenza or COVID-19.  Patient is not vaccinated against COVID-19 and has not received her influenza vaccination this year.  Patient admits to chills but has not checked her temperature at home.  Patient last took ibuprofen approximately 2 hours prior to arrival.  Patient states that she has a frontal headache bilaterally but is not the worst headache of her life and was not sudden onset.  Patient denies any known sick contacts.  Patient does admit to generalized body aches. Patient states that she has had COVID-19 previously with similar symptoms.  Patient denies any abdominal pain, nausea, vomiting, diarrhea, cough, otalgia, chest pain, visual changes, difficulty breathing, and dizziness.    Nursing Notes were all reviewed and agreed with or any disagreements were addressed in the HPI.    REVIEW OF SYSTEMS :      Review of Systems    Review of systems reviewed and negative except as in HPI/MDM    PAST MEDICAL HISTORY     Past Medical History:   Diagnosis Date    Anxiety     Arthritis      DO  03/16/24 1415

## 2024-03-16 NOTE — ED NOTES
Discharge instructions provided to patient. Patient verbalized understanding of d/c plan and follow up care. Patient ambulatory off unit to POV at this time with no further needs noted.

## 2024-03-26 RX ORDER — ROPINIROLE 1 MG/1
1 TABLET, FILM COATED ORAL NIGHTLY
Qty: 30 TABLET | Refills: 5 | OUTPATIENT
Start: 2024-03-26

## 2024-08-13 ENCOUNTER — OFFICE VISIT (OUTPATIENT)
Dept: PULMONOLOGY | Facility: CLINIC | Age: 49
End: 2024-08-13
Payer: MEDICAID

## 2024-08-13 VITALS
OXYGEN SATURATION: 96 % | SYSTOLIC BLOOD PRESSURE: 124 MMHG | HEART RATE: 91 BPM | BODY MASS INDEX: 37.65 KG/M2 | WEIGHT: 226 LBS | DIASTOLIC BLOOD PRESSURE: 80 MMHG | HEIGHT: 65 IN | RESPIRATION RATE: 18 BRPM

## 2024-08-13 DIAGNOSIS — G47.33 OBSTRUCTIVE SLEEP APNEA: Primary | ICD-10-CM

## 2024-08-13 DIAGNOSIS — E66.01 MORBID OBESITY, UNSPECIFIED OBESITY TYPE: ICD-10-CM

## 2024-08-13 DIAGNOSIS — Z72.0 VAPES NICOTINE CONTAINING SUBSTANCE: ICD-10-CM

## 2024-08-13 PROCEDURE — 3074F SYST BP LT 130 MM HG: CPT | Performed by: INTERNAL MEDICINE

## 2024-08-13 PROCEDURE — 3079F DIAST BP 80-89 MM HG: CPT | Performed by: INTERNAL MEDICINE

## 2024-08-13 PROCEDURE — 99214 OFFICE O/P EST MOD 30 MIN: CPT | Performed by: INTERNAL MEDICINE

## 2024-08-13 NOTE — PROGRESS NOTES
"  Chief Complaint   Patient presents with    Sleeping Problem    Follow-up       Subjective   April Judy Nicolas is a 49 y.o. female.   Patient was evaluated today for follow up of Sleep apnea. Patient does report some initial improvement but now feels that the PAP device is not relieving some of the symptoms.    Patient reports worsening in daytime sleepiness when compared to before. Patient is not aware of snoring through the device.     she is complaining of issues with occasional excessive dryness.    Patient denies any issues with her mask.     Patient says that the compliance with the use of the equipment is good.     She vapes regularly. She vapes right before she goes to bed.       The following portions of the patient's history were reviewed and updated as appropriate: allergies, current medications, past family history, past medical history, past social history, and past surgical history.    Review of Systems   HENT:  Negative for sinus pressure, sneezing and sore throat.    Respiratory:  Negative for cough, chest tightness, shortness of breath and wheezing.    Psychiatric/Behavioral:  Positive for sleep disturbance.        Objective   Visit Vitals  /80   Pulse 91   Resp 18   Ht 165.1 cm (65\") Comment: pt reported   Wt 103 kg (226 lb)   SpO2 96%   BMI 37.61 kg/m²       BMI Readings from Last 8 Encounters:   08/13/24 37.61 kg/m²   03/14/24 36.88 kg/m²   09/12/23 40.10 kg/m²   06/09/22 40.44 kg/m²   11/25/20 35.61 kg/m²   10/21/20 35.61 kg/m²   09/30/20 35.61 kg/m²   09/24/20 35.71 kg/m²       Physical Exam  Vitals reviewed.   Constitutional:       Appearance: She is well-developed.   HENT:      Head: Atraumatic.      Mouth/Throat:      Comments: Oropharynx was crowded.  Neck:      Comments: Increased neck circumference noted.  Cardiovascular:      Rate and Rhythm: Normal rate and regular rhythm.      Heart sounds: Normal heart sounds. No murmur heard.  Pulmonary:      Effort: Pulmonary effort is " normal. No respiratory distress.      Breath sounds: Normal breath sounds. No wheezing or rales.   Musculoskeletal:      Comments: Gait was normal.   Neurological:      Mental Status: She is alert and oriented to person, place, and time.             Assessment & Plan   Diagnoses and all orders for this visit:    1. Obstructive sleep apnea (Primary)  -     Polysomnography 4 or More Parameters With CPAP; Future    2. Morbid obesity, unspecified obesity type    3. Vapes nicotine containing substance           Return in about 23 weeks (around 1/21/2025) for SleepONLY/Fatimah.    DISCUSSION (if any):  Sleep study performed in 2017  AHI was 6 / hour.     Latest PAP device provided in July 2023  DME company: [employer]    Current PAP settings: 14-20  Current mask type: Nasal mask.    I told the patient that her symptoms are consistent with partially controlled sleep apnea.    Since the patient is having significant issues, the only realistic option is for her to undergo a full night titration study.    She has been on Robinul for about 6-8 months but her excessive dryness with CPAP has been going on for much longer.     Patient was advised to continue using PAP for at least 4 hours every night.    Patient was advised to call this office with any issues.    Vaping cessation was advised and discussed.     She was requested NOT to vape within 4 hours of bedtime.     She may need to have her medications reviewed, if she has not change in symptoms even after titration study, as some of her medications can also be responsible for her residual daytime sleepiness.    I spent a total of 31 minutes face to face with this patient. her pertinent current and previous data, as applicable, were reviewed with the patient. Patient's diagnostic studies were also reviewed, including previous sleep study. We also discussed sleep hygiene measures and measures to increase compliance with the CPAP device.  Time also includes documentation in  the chart and reviewing data in the chart.        Dictated utilizing Dragon dictation.    This document was electronically signed by Sheree Gonzalez MD on 08/13/24 at 15:32 EDT

## 2024-08-21 ENCOUNTER — TRANSCRIBE ORDERS (OUTPATIENT)
Dept: ADMINISTRATIVE | Facility: HOSPITAL | Age: 49
End: 2024-08-21
Payer: MEDICAID

## 2024-08-21 DIAGNOSIS — R60.0 LOWER EXTREMITY EDEMA: Primary | ICD-10-CM

## 2024-09-10 ENCOUNTER — HOSPITAL ENCOUNTER (OUTPATIENT)
Dept: SLEEP MEDICINE | Facility: HOSPITAL | Age: 49
Discharge: HOME | End: 2024-09-10
Admitting: INTERNAL MEDICINE
Payer: MEDICAID

## 2024-09-10 DIAGNOSIS — G47.33 OBSTRUCTIVE SLEEP APNEA: ICD-10-CM

## 2024-09-10 PROCEDURE — 95811 POLYSOM 6/>YRS CPAP 4/> PARM: CPT

## 2024-09-11 ENCOUNTER — TRANSCRIBE ORDERS (OUTPATIENT)
Dept: ADMINISTRATIVE | Facility: HOSPITAL | Age: 49
End: 2024-09-11
Payer: MEDICAID

## 2024-09-11 DIAGNOSIS — R60.0 LOCALIZED EDEMA: Primary | ICD-10-CM

## 2024-09-12 ENCOUNTER — OFFICE VISIT (OUTPATIENT)
Age: 49
End: 2024-09-12
Payer: MEDICAID

## 2024-09-12 ENCOUNTER — LAB (OUTPATIENT)
Facility: HOSPITAL | Age: 49
End: 2024-09-12
Payer: MEDICAID

## 2024-09-12 ENCOUNTER — PATIENT MESSAGE (OUTPATIENT)
Age: 49
End: 2024-09-12
Payer: MEDICAID

## 2024-09-12 ENCOUNTER — TELEPHONE (OUTPATIENT)
Age: 49
End: 2024-09-12

## 2024-09-12 VITALS
WEIGHT: 232 LBS | SYSTOLIC BLOOD PRESSURE: 122 MMHG | HEIGHT: 65 IN | DIASTOLIC BLOOD PRESSURE: 80 MMHG | TEMPERATURE: 98.2 F | BODY MASS INDEX: 38.65 KG/M2 | HEART RATE: 92 BPM

## 2024-09-12 DIAGNOSIS — M06.4 INFLAMMATORY POLYARTHRITIS: ICD-10-CM

## 2024-09-12 DIAGNOSIS — M54.2 NECK PAIN: Primary | ICD-10-CM

## 2024-09-12 DIAGNOSIS — M25.552 PAIN OF LEFT HIP: ICD-10-CM

## 2024-09-12 PROCEDURE — 80053 COMPREHEN METABOLIC PANEL: CPT

## 2024-09-12 PROCEDURE — 85652 RBC SED RATE AUTOMATED: CPT

## 2024-09-12 PROCEDURE — 86140 C-REACTIVE PROTEIN: CPT

## 2024-09-12 PROCEDURE — 86431 RHEUMATOID FACTOR QUANT: CPT

## 2024-09-12 PROCEDURE — 86235 NUCLEAR ANTIGEN ANTIBODY: CPT

## 2024-09-12 PROCEDURE — 36415 COLL VENOUS BLD VENIPUNCTURE: CPT

## 2024-09-12 PROCEDURE — 85025 COMPLETE CBC W/AUTO DIFF WBC: CPT

## 2024-09-12 RX ORDER — FUROSEMIDE 40 MG/1
1 TABLET ORAL DAILY
COMMUNITY
Start: 2024-08-20

## 2024-09-12 RX ORDER — FOLIC ACID 1 MG/1
TABLET ORAL
COMMUNITY
Start: 2024-09-01

## 2024-09-12 RX ORDER — CYCLOBENZAPRINE HCL 10 MG
10 TABLET ORAL 3 TIMES DAILY PRN
Qty: 90 TABLET | Refills: 4 | Status: SHIPPED | OUTPATIENT
Start: 2024-09-12 | End: 2025-02-12

## 2024-09-12 NOTE — PROGRESS NOTES
[facility] Office Visit     Office Visit       Date: 09/12/2024   Patient Name: Vanna Nicolas  MRN: [id number]  YOB: 1975    Referring Physician: Delmi Li APRN     Chief Complaint   Patient presents with    hospitals Care       History of Present Illness: Vanna Nicolas is a 49 y.o. female who is here today   History of Present Illness  The patient presents for evaluation of rheumatoid arthritis.    She was diagnosed with rheumatoid arthritis 2 years ago and has been under the care of Dr. Anderson and Dr. Beltran. She was prescribed folic acid and methotrexate but discontinued the latter after 6 months due to lack of improvement. She experiences persistent morning stiffness in her joints, affecting her shoulders, hips, knees, ankles, and feet. Her wrists are constantly painful, and her hands go numb when holding objects or washing dishes. Despite undergoing carpal tunnel surgery on both hands, she found no relief. She also reports pain and swelling in her knuckles. Previous treatments with Plaquenil, prednisone, Celebrex, and meloxicam have not been effective. She has tried sulfasalazine without success and has not taken Humira or Remicade. She is currently taking Celebrex and has not experienced any worsening bleeding. She has difficulty dressing, walking up and down stairs, and cleaning her house.    She has a history of ulcerative colitis, which occasionally flares up, and is currently managed with Lialda and Stelara. She also has a history of melanoma on her left thigh, which was removed in 1999.    She is under the care of Dr. Gonzalez for sleep apnea and recently had a sleep study done. She has fibromyalgia and exercises about 3 days a week. She has mild dry eyes and dry mouth and was told she had Raynaud's a few years ago. She has seen a neurologist in the past and has had suicidal thoughts but is not currently suicidal.    She has  been experiencing neck pain and stiffness for the last 8 months and has not noticed any difference with Flexeril. She has not tried Tylenol Arthritis. She has been having a lot of pain in her left hip, which hurts all the time, and feels like it wants to give out on her when she walks for a long time.    She reports no history of blood clots, heart attack, stroke, coughing up blood, urinating blood, severe shortness of breath, skin rashes, MS, congestive heart failure, chronic infection, hepatitis, diverticulitis, or COPD. She has broken her right ankle and left wrist in the past, both of which required surgery.    SOCIAL HISTORY  She vapes.    FAMILY HISTORY  Her sibling has Crohn's. Her mother has ulcerative colitis.    Result Review :          Results  Laboratory Studies  Negative ELÍAS, negative 14.3 0.3 eta. Alkaline phosphatase slightly elevated at 123, ALT was 41. Positive Lizy-Barr virus, CMV antibodies were both negative. Rheumatoid factor negative, CCP negative. Vitamin B12 was 530, vitamin D was 52.9. Sed rate was 37, C-reactive protein was 14. Positive P ANCA.    Imaging  Spine film showed degenerative changes, right SI joint had some degenerative changes or sclerosis, but no erosions. Hand films showed mild right radiocarpal joint space narrowing and some mild ulnar congenital changes, no erosions.          Subjective     Allergies   Allergen Reactions    Penicillins Unknown (See Comments)     'AS A BABY ALMOST PUT ME INTO A COMA'    Morphine And Codeine Nausea And Vomiting    Tetracyclines & Related Hives         Current Outpatient Medications:     amLODIPine (NORVASC) 5 MG tablet, , Disp: , Rfl:     atorvastatin (LIPITOR) 20 MG tablet, 2 tablets., Disp: , Rfl:     celecoxib (CeleBREX) 200 MG capsule, Take 1 capsule by mouth Daily., Disp: , Rfl:     cyclobenzaprine (FLEXERIL) 5 MG tablet, Take 1 tablet by mouth 3 (Three) Times a Day As Needed for Muscle Spasms., Disp: , Rfl:     divalproex (DEPAKOTE)  500 MG 24 hr tablet, Take 1 tablet by mouth Every Night., Disp: 90 tablet, Rfl: 1    DULoxetine (CYMBALTA) 30 MG capsule, Take 2 capsules by mouth Daily., Disp: , Rfl:     estradiol (ESTRACE) 1 MG tablet, Take 1 tablet by mouth Daily. Take 1/2 tablet to 1 tablet by mouth daily (Patient taking differently: Take 2 tablets by mouth Daily. Take 1/2 tablet to 1 tablet by mouth daily), Disp: 30 tablet, Rfl: 5    folic acid (FOLVITE) 1 MG tablet, TAKE 1 TABLET BY MOUTH WITH FOOD, Disp: , Rfl:     furosemide (LASIX) 40 MG tablet, Take 1 tablet by mouth Daily., Disp: , Rfl:     glycopyrrolate (ROBINUL) 1 MG tablet, Take 1 tablet by mouth 3 (Three) Times a Day., Disp: , Rfl:     mesalamine (LIALDA) 1.2 g EC tablet, TAKE 2 TABLETS BY MOUTH ONE TIME A DAY, Disp: , Rfl: 1    metFORMIN (GLUCOPHAGE) 500 MG tablet, Take 1 tablet by mouth 2 (Two) Times a Day With Meals., Disp: , Rfl:     mirtazapine (REMERON) 15 MG tablet, Take 1 tablet by mouth Every Night., Disp: , Rfl:     Ustekinumab (STELARA) 90 MG/ML solution prefilled syringe Injection, Inject  under the skin into the appropriate area as directed. Every 8 weeks, Disp: , Rfl:     Cholecalciferol (Vitamin D3) 75 MCG (3000 UT) tablet, Take 2,000 Units by mouth. (Patient not taking: Reported on 9/12/2024), Disp: , Rfl:     hydroCHLOROthiazide (HYDRODIURIL) 25 MG tablet, Take 1 tablet by mouth Daily. (Patient not taking: Reported on 9/12/2024), Disp: , Rfl:     hydrOXYzine (ATARAX) 10 MG tablet, Take 1 tablet by mouth 3 (Three) Times a Day As Needed for Itching. (Patient not taking: Reported on 9/12/2024), Disp: , Rfl:     meloxicam (MOBIC) 15 MG tablet, Take 1 tablet by mouth Daily. (Patient not taking: Reported on 9/12/2024), Disp: , Rfl:     Semaglutide,0.25 or 0.5MG/DOS, (Ozempic, 0.25 or 0.5 MG/DOSE,) 2 MG/1.5ML solution pen-injector, Inject  under the skin into the appropriate area as directed 1 (One) Time Per Week. (Patient not taking: Reported on 9/12/2024), Disp: , Rfl:      Past Medical History:   Diagnosis Date    Anxiety and depression     Arthritis     Body piercing     EARS BILATERAL    Broken bones     ankle, wrist collar bone    Bursitis     Chest pain unsure of year    WITH EXCEDRINE TOO MUCH CAFFEINE.  LAST EPISODE WAS 2/2020.  PT WENT TO E.R.  STATED F/U WITH DR LEAL AND WAS TOLD THAT IT WAS DUE TO ACID REFLUX.     Diarrhea     Elevated cholesterol     Fibromyalgia     GERD (gastroesophageal reflux disease)     Hernia, umbilical     High cholesterol     Hip pain, acute, right     History of nuclear stress test 2014    Hypertension     IBS (irritable bowel syndrome)     Knee pain, bilateral     Malignant melanoma     stage 1, left leg    Migraines     OCD (obsessive compulsive disorder)     PTSD (post-traumatic stress disorder)     Sleep apnea     CPAP USAGE    Ulcerative colitis     Wears glasses         Past Surgical History:   Procedure Laterality Date    ANKLE OPEN REDUCTION INTERNAL FIXATION Right 09/25/2020    Procedure: Right distal fibula open reduction internal fixation;  Surgeon: Danny Fuller MD;  Location: [facility];  Service: Orthopedics;  Laterality: Right;    CARPAL TUNNEL RELEASE Right 01/10/2018    Procedure: ELECTIVE RIGHT CARPAL TUNNEL RELEASE;  Surgeon: Gus Ramos MD;  Location: [facility];  Service:     CARPAL TUNNEL RELEASE Left 04/18/2018    Procedure: ELECTIVE LEFT CARPAL TUNNEL RELEASE;  Surgeon: Gus Ramos MD;  Location: [facility];  Service: Orthopedics    COLONOSCOPY      ENDOSCOPY      HYSTERECTOMY      LAPAROSCOPIC TUBAL LIGATION      SKIN BIOPSY      STAGE 1 LEFT THIGH    SKIN CANCER EXCISION      TOTAL ABDOMINAL HYSTERECTOMY WITH SALPINGO OOPHORECTOMY  2004    ? Hormonal migraines    WISDOM TOOTH EXTRACTION         Family History   Problem Relation Age of Onset    Rheum arthritis Mother     Ulcerative colitis Mother     Other (ulcerative colitis) Mother     COPD Father     Crohn's disease Sister     No Known Problems Maternal  Aunt     No Known Problems Maternal Uncle     No Known Problems Paternal Aunt     No Known Problems Paternal Uncle     Kidney disease Maternal Grandmother     Alzheimer's disease Maternal Grandfather     No Known Problems Paternal Grandmother     No Known Problems Paternal Grandfather     Breast cancer Neg Hx     Ovarian cancer Neg Hx         Social History     Socioeconomic History    Marital status: Single   Tobacco Use    Smoking status: Former     Current packs/day: 0.00     Average packs/day: 1 pack/day for 9.0 years (9.0 ttl pk-yrs)     Types: Cigarettes     Start date: 2011     Quit date: 2020     Years since quittin.0    Smokeless tobacco: Never    Tobacco comments:     vaping, quit smoking 2020    Vaping Use    Vaping status: Never Used   Substance and Sexual Activity    Alcohol use: No    Drug use: No    Sexual activity: Defer       Review of Systems   Constitutional:  Positive for fatigue and unexpected weight gain. Negative for fever.   HENT:  Negative for mouth sores, nosebleeds, swollen glands and trouble swallowing.         Dry eyes  Dry mouth  Hearing loss     Eyes:  Positive for blurred vision and visual disturbance. Negative for double vision, photophobia and pain.   Respiratory:  Positive for apnea. Negative for cough, choking and shortness of breath.    Cardiovascular:  Positive for chest pain. Negative for palpitations and leg swelling.        Edema  Raynaud's     Gastrointestinal:  Positive for abdominal pain, constipation, diarrhea and GERD. Negative for blood in stool, nausea and vomiting.        Bloating  Hemorrhoids  Loss of appetite   Endocrine: Positive for cold intolerance, heat intolerance and polydipsia. Negative for polyphagia and polyuria.        Hair loss  Hot flashes   Genitourinary:  Negative for difficulty urinating, dysuria, genital sores, hematuria, urgency and urinary incontinence.   Musculoskeletal:  Positive for arthralgias, gait problem, joint swelling,  "neck pain and neck stiffness. Negative for back pain and bursitis.        Joint tenderness   Muscle cramping  Morning stiffness     Skin:  Negative for rash.   Allergic/Immunologic: Negative for environmental allergies and food allergies.   Neurological:  Positive for weakness, numbness, headache, memory problem and confusion. Negative for tremors, seizures and syncope.        Tingling   Hematological:  Negative for adenopathy. Bruises/bleeds easily.   Psychiatric/Behavioral:  Positive for sleep disturbance, suicidal ideas and depressed mood. Negative for stress. The patient is nervous/anxious.         Emotional labile      I reviewed the patient's chief complaint, history of present illness, review of systems, past medical history, surgical history, family history, social history, medications and allergy list.     Objective    Vital Signs:   Vitals:    09/12/24 1304   Weight: 105 kg (232 lb)   Height: 165.1 cm (65\")   PainSc:   6 April Judy Nicolas reports a pain score of 6.  Given her pain assessment as noted, treatment options were discussed and the following options were decided upon as a follow-up plan to address the patient's pain: .      Body mass index is 38.61 kg/m².            Physical Exam  Physical Exam  Patient is an alert female, no acute distress.  Head is atraumatic, normocephalic with pupils equal and round. Extraocular muscles are intact. Oropharynx is negative. Patient appears well hydrated.  Mild decreased range of motion noted in the neck.  Heart rhythm is regular without rubs, gallops or murmurs.  Abdomen is soft and nontender with good bowel sounds.  Thoracic and lumbar spine are nontender. Sacroiliac joints are nontender. One out of 18 fibromyalgia syndrome tender points are tender. Tenderness of the right MCPs with mild swelling. Crepitus noted in the left knee. Crepitus noted in the right knee. Both ankles and both MTPs are tender. Swelling observed in the ankles today. Motor " strength is 5/5 throughout.  Skin is tan. No acute rashes present.  Bilateral and symmetric reflexes are diminished.       Physical Exam  There is currently no information documented on the homunculus. Go to the [facility]umat[facility] activity and [facility].       Results Review:   Imaging Results (Last 24 Hours)       ** No results found for the last 24 hours. **            Procedures    Assessment / Plan    Assessment/Plan:   There are no diagnoses linked to this encounter.      Assessment & Plan  1. Inflammatory polyarthritis.  Significant tenderness and swelling of the MCPs, ankles, and MTPs were observed during the examination. Negative results were obtained for 14.3.3 eta, rheumatoid factor, CCP, and ELÍAS in November 2023. The hand films from the past year did not show any erosive changes. Mild dry eyes and dry mouth were noted, but Sjogren's syndrome is unlikely. Rheumatoid factor III will be conducted to check for abnormalities. Foot films will be performed to look for erosive disease. SSA and SSB tests will be conducted for completeness. Sed rate and CRP will be checked. She is currently off any disease modifier therapy and can continue with Celebrex as she tolerates it with her colon disease. Once the results are obtained, a discussion with Dr. Garcia regarding alternative treatments for joints and bowel (Rinvoq versus TNF versus other) will be initiated. Over-the-counter Tylenol Arthritis 650 mg, 2 pills in the morning and 2 at night, will be added for several weeks to assess its effectiveness. CBC and CMP will be added. X-rays of the feet, cervical spine, and left hip will be performed. She is advised to try sleeping in splints at night to see if it provides any relief.    2. Fibromyalgia.  Despite overall good management of her fibromyalgia, she is experiencing increasing discomfort around the neck and shoulders. She is currently on duloxetine and Flexeril 5 mg three times a day as  needed, which she tolerates well and does not cause drowsiness. The dosage of Flexeril will be increased to 10 mg three times a day as needed.    3. History of melanoma.  She is advised to use sunscreen aggressively, preferably with an SPF of 100, and to consider using Blue Lizard.    4. High-risk medication.  She is currently on Stelara and Lialda for the treatment of her inflammatory bowel disease. Collaboration with GI may be necessary to adjust this treatment in the future, depending on the outcome of the current workup.          Follow Up:   No follow-ups on file.         Dolores Rodrigez MD  [facility] [place] [facility]     Encounter Administratively Closed by Health Information Management

## 2024-09-13 ENCOUNTER — HOSPITAL ENCOUNTER (OUTPATIENT)
Dept: ULTRASOUND IMAGING | Facility: HOSPITAL | Age: 49
Discharge: HOME | End: 2024-09-13
Payer: MEDICAID

## 2024-09-13 DIAGNOSIS — R60.0 LOWER EXTREMITY EDEMA: ICD-10-CM

## 2024-09-13 DIAGNOSIS — M50.30 DDD (DEGENERATIVE DISC DISEASE), CERVICAL: ICD-10-CM

## 2024-09-13 DIAGNOSIS — M25.552 LEFT HIP PAIN: Primary | ICD-10-CM

## 2024-09-13 LAB
ALBUMIN SERPL-MCNC: 4.3 G/DL (ref 3.5–5.2)
ALBUMIN/GLOB SERPL: 1.6 G/DL
ALP SERPL-CCNC: 102 U/L (ref 39–117)
ALT SERPL W P-5'-P-CCNC: 38 U/L (ref 1–33)
ANION GAP SERPL CALCULATED.3IONS-SCNC: 9.8 MMOL/L (ref 5–15)
AST SERPL-CCNC: 28 U/L (ref 1–32)
BASOPHILS # BLD AUTO: 0.06 10*3/MM3 (ref 0–0.2)
BASOPHILS NFR BLD AUTO: 0.7 % (ref 0–1.5)
BILIRUB SERPL-MCNC: 0.2 MG/DL (ref 0–1.2)
BUN SERPL-MCNC: 14 MG/DL (ref 6–20)
BUN/CREAT SERPL: 19.4 (ref 7–25)
CALCIUM SPEC-SCNC: 9.8 MG/DL (ref 8.6–10.5)
CHLORIDE SERPL-SCNC: 104 MMOL/L (ref 98–107)
CO2 SERPL-SCNC: 27.2 MMOL/L (ref 22–29)
CREAT SERPL-MCNC: 0.72 MG/DL (ref 0.57–1)
CRP SERPL-MCNC: 0.37 MG/DL (ref 0–0.5)
DEPRECATED RDW RBC AUTO: 44.1 FL (ref 37–54)
EGFRCR SERPLBLD CKD-EPI 2021: 103.3 ML/MIN/1.73
EOSINOPHIL # BLD AUTO: 0.2 10*3/MM3 (ref 0–0.4)
EOSINOPHIL NFR BLD AUTO: 2.3 % (ref 0.3–6.2)
ERYTHROCYTE [DISTWIDTH] IN BLOOD BY AUTOMATED COUNT: 12.9 % (ref 12.3–15.4)
ERYTHROCYTE [SEDIMENTATION RATE] IN BLOOD: 20 MM/HR (ref 0–20)
GLOBULIN UR ELPH-MCNC: 2.7 GM/DL
GLUCOSE SERPL-MCNC: 81 MG/DL (ref 65–99)
HCT VFR BLD AUTO: 41.4 % (ref 34–46.6)
HGB BLD-MCNC: 13.7 G/DL (ref 12–15.9)
IMM GRANULOCYTES # BLD AUTO: 0.06 10*3/MM3 (ref 0–0.05)
IMM GRANULOCYTES NFR BLD AUTO: 0.7 % (ref 0–0.5)
LYMPHOCYTES # BLD AUTO: 1.91 10*3/MM3 (ref 0.7–3.1)
LYMPHOCYTES NFR BLD AUTO: 22 % (ref 19.6–45.3)
MCH RBC QN AUTO: 30.9 PG (ref 26.6–33)
MCHC RBC AUTO-ENTMCNC: 33.1 G/DL (ref 31.5–35.7)
MCV RBC AUTO: 93.5 FL (ref 79–97)
MONOCYTES # BLD AUTO: 0.56 10*3/MM3 (ref 0.1–0.9)
MONOCYTES NFR BLD AUTO: 6.5 % (ref 5–12)
NEUTROPHILS NFR BLD AUTO: 5.88 10*3/MM3 (ref 1.7–7)
NEUTROPHILS NFR BLD AUTO: 67.8 % (ref 42.7–76)
NRBC BLD AUTO-RTO: 0 /100 WBC (ref 0–0.2)
PLATELET # BLD AUTO: 260 10*3/MM3 (ref 140–450)
PMV BLD AUTO: 10 FL (ref 6–12)
POTASSIUM SERPL-SCNC: 4.5 MMOL/L (ref 3.5–5.2)
PROT SERPL-MCNC: 7 G/DL (ref 6–8.5)
RBC # BLD AUTO: 4.43 10*6/MM3 (ref 3.77–5.28)
SODIUM SERPL-SCNC: 141 MMOL/L (ref 136–145)
WBC NRBC COR # BLD AUTO: 8.67 10*3/MM3 (ref 3.4–10.8)

## 2024-09-13 PROCEDURE — 95811 POLYSOM 6/>YRS CPAP 4/> PARM: CPT | Performed by: INTERNAL MEDICINE

## 2024-09-13 PROCEDURE — 93970 EXTREMITY STUDY: CPT

## 2024-09-14 LAB
ENA SS-A AB SER-ACNC: <0.2 AI (ref 0–0.9)
ENA SS-B AB SER-ACNC: <0.2 AI (ref 0–0.9)

## 2024-09-17 ENCOUNTER — TELEPHONE (OUTPATIENT)
Age: 49
End: 2024-09-17
Payer: MEDICAID

## 2024-09-20 DIAGNOSIS — G47.33 OSA (OBSTRUCTIVE SLEEP APNEA): Primary | ICD-10-CM

## 2024-09-27 LAB
RF IGA SER-ACNC: <7 U
RF IGG SER-ACNC: <7 U
RF IGM SER IA-ACNC: <7 U

## 2024-10-14 ENCOUNTER — HOSPITAL ENCOUNTER (OUTPATIENT)
Dept: PHYSICAL THERAPY | Facility: HOSPITAL | Age: 49
Setting detail: THERAPIES SERIES
Discharge: HOME | End: 2024-10-14
Payer: MEDICAID

## 2024-10-14 PROCEDURE — 97110 THERAPEUTIC EXERCISES: CPT | Performed by: PHYSICAL THERAPIST

## 2024-10-14 PROCEDURE — 97162 PT EVAL MOD COMPLEX 30 MIN: CPT | Performed by: PHYSICAL THERAPIST

## 2024-10-14 ASSESSMENT — PAIN DESCRIPTION - PAIN TYPE: TYPE: CHRONIC PAIN

## 2024-10-14 ASSESSMENT — PAIN DESCRIPTION - LOCATION: LOCATION: NECK

## 2024-10-14 ASSESSMENT — PAIN DESCRIPTION - ORIENTATION: ORIENTATION: RIGHT;LEFT

## 2024-10-14 ASSESSMENT — PAIN SCALES - GENERAL: PAINLEVEL_OUTOF10: 5

## 2024-10-16 ENCOUNTER — OFFICE VISIT (OUTPATIENT)
Dept: CARDIOLOGY | Facility: CLINIC | Age: 49
End: 2024-10-16
Payer: MEDICAID

## 2024-10-16 VITALS
HEIGHT: 65 IN | HEART RATE: 95 BPM | DIASTOLIC BLOOD PRESSURE: 88 MMHG | SYSTOLIC BLOOD PRESSURE: 136 MMHG | BODY MASS INDEX: 39.82 KG/M2 | OXYGEN SATURATION: 97 % | WEIGHT: 239 LBS

## 2024-10-16 DIAGNOSIS — R60.0 BILATERAL LOWER EXTREMITY EDEMA: Primary | ICD-10-CM

## 2024-10-16 DIAGNOSIS — I10 ESSENTIAL HYPERTENSION: ICD-10-CM

## 2024-10-16 DIAGNOSIS — E66.9 OBESITY (BMI 30-39.9): ICD-10-CM

## 2024-10-16 NOTE — ASSESSMENT & PLAN NOTE
-Will repeat echocardiogram to assess for systolic/ lower function, last echo in 2017 was normal  -Denies shortness of breath but is very sedentary at baseline  -Suspect lower extremity edema remains bothersome due to high sodium diet and noncompliance with compression stockings.  States she does elevate legs most of the day with swelling notable after on her feet for any length of time.  -Continues to take Lasix daily  -Maintained on 5mg of Norvasc which could be contributing to edema, but unlikely with low dose.  Discussed if conservative measures did not yield results could possibly change to another anti-hypertensive at that time and see if edema improved.  -Discussed in depth to reduce sodium aggressively in diet with Salty Six and Limiting Salt handouts given  -Continue elevation and use compression stockings daily  -Follow up with Dr. Esquivel in 4 weeks to reassess symptoms and for echocardiogram results.

## 2024-10-16 NOTE — PROGRESS NOTES
[facility]    Office Consult     Vanna Nicolas  [id number]  10/16/2024    Referred By: Delmi Li APRN    Chief Complaint   Patient presents with    Establish Care     Patient is having bilateral lower edema with pain that goes down to her feet.       Subjective     History of Present Illness:   Vanna Nicolas is a 49 y.o. female who presents to the Cardiology Clinic for evaluation of lower extremity edema.  Patient has previously been seen in this clinic in 2017 with numerous cardiac tests performed which were all reassuringly normal as she had complaints of chest pain, shortness of breath, and palpitations as well as lower extremity edema.  Her edema etiology was noted to be due to chronic venous insufficiency and elevation and compression stockings were recommended at that time.  Patient states that she does not work currently and is not on her feet very much.  She states that she was seen here previously and it has just gotten worse.  She states she keeps her feet up in a recliner all day.  States she had compression stockings in the past but hasn't had any for quite some time.  States she just got a new pair yesterday.  Doppler performed was negative for DVTs.  She denies any pain with ambulation.  States that her left leg changes color sometimes.  States her feet do hurt with ambulation.  Denies any chest pain or shortness of breath, but sedentary at  baseline.  Currently utilizing Lasix which she says does help a little bit.  States her diet has a lot of processed foods at baseline such as pizza, chicken strips, and french fries.      Past Cardiac Testin. Last Coronary Angio: None  2. Prior Stress Testin--Normal Lexiscan  3. Last Echo: 2017--Left ventricular systolic function hyperdynamic EF>70%  4. Prior Holter Monitor: 2017--Normal monitor study    Review of Systems   Constitutional:  Negative for activity change and fatigue.   Respiratory:   Negative for chest tightness and shortness of breath.    Cardiovascular:  Positive for leg swelling. Negative for chest pain and palpitations.   Neurological:  Negative for dizziness.   All other systems reviewed and are negative.       Past Medical History:   Diagnosis Date    Anxiety and depression     Arthritis     Body piercing     EARS BILATERAL    Broken bones     ankle, wrist collar bone    Bursitis     Chest pain unsure of year    WITH EXCEDRINE TOO MUCH CAFFEINE.  LAST EPISODE WAS 2/2020.  PT WENT TO E.R.  STATED F/U WITH DR LEAL AND WAS TOLD THAT IT WAS DUE TO ACID REFLUX.     Diarrhea     Elevated cholesterol     Fibromyalgia     GERD (gastroesophageal reflux disease)     Hernia, umbilical     High cholesterol     Hip pain, acute, right     History of nuclear stress test 2014    Hypertension     IBS (irritable bowel syndrome)     Knee pain, bilateral     Malignant melanoma     stage 1, left leg    Migraines     OCD (obsessive compulsive disorder)     PTSD (post-traumatic stress disorder)     Sleep apnea     CPAP USAGE    Ulcerative colitis     Wears glasses        Past Surgical History:   Procedure Laterality Date    ANKLE OPEN REDUCTION INTERNAL FIXATION Right 09/25/2020    Procedure: Right distal fibula open reduction internal fixation;  Surgeon: Danny Fuller MD;  Location: [facility];  Service: Orthopedics;  Laterality: Right;    CARPAL TUNNEL RELEASE Right 01/10/2018    Procedure: ELECTIVE RIGHT CARPAL TUNNEL RELEASE;  Surgeon: Gus Ramos MD;  Location: [facility];  Service:     CARPAL TUNNEL RELEASE Left 04/18/2018    Procedure: ELECTIVE LEFT CARPAL TUNNEL RELEASE;  Surgeon: Gus Ramos MD;  Location: [facility];  Service: Orthopedics    COLONOSCOPY      ENDOSCOPY      HYSTERECTOMY      LAPAROSCOPIC TUBAL LIGATION      SKIN BIOPSY      STAGE 1 LEFT THIGH    SKIN CANCER EXCISION      TOTAL ABDOMINAL HYSTERECTOMY WITH SALPINGO OOPHORECTOMY  2004    ? Hormonal migraines    WISDOM TOOTH  EXTRACTION         Family History   Problem Relation Age of Onset    Rheum arthritis Mother     Ulcerative colitis Mother     Other (ulcerative colitis) Mother     COPD Father     Crohn's disease Sister     No Known Problems Maternal Aunt     No Known Problems Maternal Uncle     No Known Problems Paternal Aunt     No Known Problems Paternal Uncle     Kidney disease Maternal Grandmother     Alzheimer's disease Maternal Grandfather     No Known Problems Paternal Grandmother     No Known Problems Paternal Grandfather     Breast cancer Neg Hx     Ovarian cancer Neg Hx        Social History     Socioeconomic History    Marital status: Single   Tobacco Use    Smoking status: Former     Current packs/day: 0.00     Average packs/day: 1 pack/day for 9.0 years (9.0 ttl pk-yrs)     Types: Cigarettes     Start date: 2011     Quit date: 2020     Years since quittin.1    Smokeless tobacco: Never    Tobacco comments:     vaping, quit smoking 2020    Vaping Use    Vaping status: Never Used   Substance and Sexual Activity    Alcohol use: No    Drug use: No    Sexual activity: Defer         Current Outpatient Medications:     amLODIPine (NORVASC) 5 MG tablet, , Disp: , Rfl:     atorvastatin (LIPITOR) 20 MG tablet, 2 tablets., Disp: , Rfl:     celecoxib (CeleBREX) 200 MG capsule, Take 1 capsule by mouth Daily., Disp: , Rfl:     cyclobenzaprine (FLEXERIL) 10 MG tablet, Take 1 tablet by mouth 3 (Three) Times a Day As Needed for Muscle Spasms., Disp: 90 tablet, Rfl: 4    divalproex (DEPAKOTE) 500 MG 24 hr tablet, Take 1 tablet by mouth Every Night., Disp: 90 tablet, Rfl: 1    DULoxetine (CYMBALTA) 30 MG capsule, Take 2 capsules by mouth Daily., Disp: , Rfl:     folic acid (FOLVITE) 1 MG tablet, TAKE 1 TABLET BY MOUTH WITH FOOD, Disp: , Rfl:     furosemide (LASIX) 40 MG tablet, Take 1 tablet by mouth Daily., Disp: , Rfl:     glycopyrrolate (ROBINUL) 1 MG tablet, Take 1 tablet by mouth 3 (Three) Times a Day., Disp: ,  "Rfl:     hydrOXYzine (ATARAX) 10 MG tablet, Take 1 tablet by mouth 3 (Three) Times a Day As Needed for Itching., Disp: , Rfl:     meloxicam (MOBIC) 15 MG tablet, Take 1 tablet by mouth Daily., Disp: , Rfl:     mesalamine (LIALDA) 1.2 g EC tablet, TAKE 2 TABLETS BY MOUTH ONE TIME A DAY, Disp: , Rfl: 1    metFORMIN (GLUCOPHAGE) 500 MG tablet, Take 1 tablet by mouth 2 (Two) Times a Day With Meals., Disp: , Rfl:     mirtazapine (REMERON) 15 MG tablet, Take 1 tablet by mouth Every Night., Disp: , Rfl:     Ustekinumab (STELARA) 90 MG/ML solution prefilled syringe Injection, Inject  under the skin into the appropriate area as directed. Every 8 weeks, Disp: , Rfl:       Allergies   Allergen Reactions    Penicillins Unknown (See Comments)     'AS A BABY ALMOST PUT ME INTO A COMA'    Morphine And Codeine Nausea And Vomiting    Tetracyclines & Related Hives       Objective     Vitals:    10/16/24 1358   BP: 136/88   BP Location: Right arm   Patient Position: Sitting   Cuff Size: Adult   Pulse: 95   SpO2: 97%   Weight: 108 kg (239 lb)   Height: 165.1 cm (65\")   PainSc:   2   PainLoc: Leg     Body mass index is 39.77 kg/m².    Physical Exam  Vitals and nursing note reviewed.   Constitutional:       General: She is not in acute distress.     Appearance: Normal appearance. She is not toxic-appearing.   HENT:      Head: Normocephalic.      Mouth/Throat:      Mouth: Mucous membranes are moist.   Eyes:      Pupils: Pupils are equal, round, and reactive to light.   Cardiovascular:      Rate and Rhythm: Normal rate and regular rhythm.      Pulses: Normal pulses.      Heart sounds: Normal heart sounds. No murmur heard.  Pulmonary:      Effort: Pulmonary effort is normal.      Breath sounds: Normal breath sounds. No wheezing, rhonchi or rales.   Musculoskeletal:      Right lower leg: Edema present.      Left lower leg: Edema present.      Comments: Non pitting bilateral lower extremity edema worse around ankles   Skin:     General: Skin " is warm and dry.      Capillary Refill: Capillary refill takes less than 2 seconds.   Neurological:      Mental Status: She is alert and oriented to person, place, and time. Mental status is at baseline.   Psychiatric:         Mood and Affect: Mood normal.         Behavior: Behavior normal.         Thought Content: Thought content normal.         Results Review:   I reviewed the patient's new clinical results.    Procedures    Assessment & Plan  Bilateral lower extremity edema  -Will repeat echocardiogram to assess for systolic/ lower function, last echo in 2017 was normal  -Denies shortness of breath but is very sedentary at baseline  -Suspect lower extremity edema remains bothersome due to high sodium diet and noncompliance with compression stockings.  States she does elevate legs most of the day with swelling notable after on her feet for any length of time.  -Continues to take Lasix daily  -Maintained on 5mg of Norvasc which could be contributing to edema, but unlikely with low dose.  Discussed if conservative measures did not yield results could possibly change to another anti-hypertensive at that time and see if edema improved.  -Discussed in depth to reduce sodium aggressively in diet with Salty Six and Limiting Salt handouts given  -Continue elevation and use compression stockings daily  -Follow up with Dr. Esquivel in 4 weeks to reassess symptoms and for echocardiogram results.    Obesity (BMI 30-39.9)  -BMI-39  -Diet and exercise recommended for weight loss  Essential hypertension  -Blood pressure acceptable          Preventative Cardiology:   Tobacco Cessation: N/A   Advance Care Planning: ACP discussion was held with the patient during this visit. Patient does not have an advance directive, declines further assistance.     Follow Up:   No follow-ups on file.      Thank you for allowing me to participate in the care of your patient. Please to not hesitate to contact me with additional questions or  concerns.     Dawna Mcclure, APRN

## 2024-10-17 ENCOUNTER — PATIENT ROUNDING (BHMG ONLY) (OUTPATIENT)
Dept: CARDIOLOGY | Facility: CLINIC | Age: 49
End: 2024-10-17
Payer: MEDICAID

## 2024-10-17 NOTE — THERAPY EVALUATION
Completed by 8 weeks Goal Status   Patient to be IND with updated HEP for discharge New   Patient to decrease pain in low back to 1/10 New   Patient to increase cervical AROM to WNL New   Patient to decrease neck index score to 20 New                     TREATMENT PLAN   PT Equipment Recommendations  Equipment Needed: No   Requires PT Follow-Up: Yes    Pt. actively involved in establishing Plan of Care and Goals: Yes  Patient/ Caregiver education and instruction: Goals, Plan of Care, Home Exercise Program, Precautions, Disease Specific Education HEP           Treatment may include any combination of the following: Current Treatment Recommendations: Strengthening, ROM, Manual, Pain management, Home exercise program, Patient/Caregiver education & training, Modalities, Positioning  Modalities: Heat/Cold, Ultrasound, E-stim - unattended, Vasopneumatic Device     Frequency / Duration:  Patient to be seen 2 x week for 8 weeks weeks      Eval Complexity: Overall Evaluation : Medium  Decision Making: Medium Complexity  History: Personal Factors and/or Comorbidities Impacting POC: Medium  Examination of body system(s) including body structures and functions, activity limitations, and/or participation restrictions: Medium  Clinical Presentation: Medium  Clinical Decision Making : Medium Complexity    PT Treatment   Exercises:  Therapeutic exercise (CPT 46764)   Treatment Reasoning    Exercise 1: sci fit, level 2, 5 min  Exercise 2: pullies, flex 3 min, ABD 3 min  Exercise 3: scapular retraction, elbows in, with red tubing, 15x  Exercise 4: scapular retraction, elbows up, with red tubing, 15x  Exercise 5: shoulder ext, with red tubing, 15x  Exercise 6: Wash cloth on wall, 3 way, circles, straight up, arches, 10x ea  Exercise 7: Standing backwards shoulder rolls, 20x  Exercise 8: doorway stretch, 3x holding for 15 sec ea  Exercise 9: seated elbow flex with 2lb weights 20x  Exercise 10: seated elbow sup/pro with 2lb weights

## 2024-10-22 ENCOUNTER — HOSPITAL ENCOUNTER (OUTPATIENT)
Dept: PHYSICAL THERAPY | Facility: HOSPITAL | Age: 49
Setting detail: THERAPIES SERIES
Discharge: HOME | End: 2024-10-22
Payer: MEDICAID

## 2024-10-22 PROCEDURE — G0283 ELEC STIM OTHER THAN WOUND: HCPCS

## 2024-10-22 PROCEDURE — 97110 THERAPEUTIC EXERCISES: CPT

## 2024-10-22 NOTE — FLOWSHEET NOTE
Physical Therapy Daily Treatment Note   Date:  10/22/2024    TIme In:    1328                Time Out:    1418    Patient Name:  Michaela Leon   :  1975  MRN: [id number]    Restrictions/Precautions:    Pertinent Medical History:  Medical/Treatment Diagnosis Information:  DDD (degenerative disc disease), cervical [M50.30]     Insurance/Certification information:  Payor: [employer] MEDICAID / Plan: [facility] MEDICAID / Product Type: *No Product type* /   Physician Information:  Shira Gallegos MD  Plan of care signed (Y/N):    Visit# / total visits:     2 /    G-Code (if applicable):      Date / Visit # G-Code Applied:         Progress Note: []  Yes  [x]  No  Next due by: Visit #10       Pain level:   3/10    Subjective:  Patient states she's \"alright\"     Objective:  Observation:   Test measurements:    Palpation:    Exercises:  Exercise Resistance/Repetitions Date performed   Sci fit Level 2   5 minutes 22   Pulleys 3 minutes each way 22   Scapular retraction elbows in Red tubing  15x 22   Scapular retraction elbows up Red tubing  15x 22   Standing shoulder extension Red tubing  15x 22   Wash cloth on wall 3-way 10x each way 22   Standing bkwd shoulder rolls 20x 22   Doorway stretch 3x  holding 15 seconds 22   Seated elbow flexion 2# weight   20x 22   Seated elbow pro/supination 2# weight  20x 22   Supine shoulder flex 1# bar  10x slowly 22   Supine shoulder ABD 1# bar  10x slowly 22     Other Therapeutic Activities:      Manual Treatments:      Modalities:  E-stim with cold pack to cervical/upper trap muscles    Timed Code Treatment Minutes:  40    Total Treatment Minutes:  50    Treatment/Activity Tolerance:  [x] Patient tolerated treatment well [] Patient limited by fatigue  [] Patient limited by pain  [] Patient limited by other medical complications  [] Other:     Pain after treatment:      3/10    Prognosis: [x] Good [] Fair  [] Poor    Patient Requires Follow-up: [x] Yes  [] No    Plan:   [x]

## 2024-10-24 ENCOUNTER — HOSPITAL ENCOUNTER (OUTPATIENT)
Dept: PHYSICAL THERAPY | Facility: HOSPITAL | Age: 49
Setting detail: THERAPIES SERIES
Discharge: HOME | End: 2024-10-24
Payer: MEDICAID

## 2024-10-24 PROCEDURE — 97110 THERAPEUTIC EXERCISES: CPT

## 2024-10-24 PROCEDURE — G0283 ELEC STIM OTHER THAN WOUND: HCPCS

## 2024-10-24 NOTE — FLOWSHEET NOTE
Physical Therapy Daily Treatment Note   Date:  10/24/2024    TIme In:    1528              Time Out:    1614    Patient Name:  Michaela Leon   :  1975  MRN: [id number]    Restrictions/Precautions:    Pertinent Medical History:  Medical/Treatment Diagnosis Information:  DDD (degenerative disc disease), cervical [M50.30]     Insurance/Certification information:  Payor: [facility] [place] MEDICAID / Plan: [facility] MEDICAID / Product Type: *No Product type* /   Physician Information:  Shira Gallegos MD  Plan of care signed (Y/N):    Visit# / total visits:     3 /    G-Code (if applicable):      Date / Visit # G-Code Applied:         Progress Note: []  Yes  [x]  No  Next due by: Visit #10       Pain level:   3/10    Subjective:  Patient offers no new c/o. Reports doing HEP.     Objective:  Observation:   Test measurements:    Palpation:    Exercises:  Exercise Resistance/Repetitions Date performed   Sci fit Level 2   5 minutes 24   Pulleys 3 minutes each way 24   Scapular retraction elbows in Red tubing  15x 24   Scapular retraction elbows up Red tubing  15x 24   Standing shoulder extension Red tubing  15x 24   Wash cloth on wall 3-way 10x each way 24   Standing bkwd shoulder rolls 20x 24   Doorway stretch 3x  holding 15 seconds 24   Seated elbow flexion 2# weight   20x 24   Seated elbow pro/supination 2# weight  20x 24   Supine shoulder flex 1# bar  10x slowly 24   Supine shoulder ABD 1# bar  10x slowly 24     Other Therapeutic Activities:      Manual Treatments:      Modalities:  E-stim with cold pack to cervical/upper trap muscles    Timed Code Treatment Minutes:  46    Total Treatment Minutes:  46    Treatment/Activity Tolerance:  [x] Patient tolerated treatment well [] Patient limited by fatigue  [] Patient limited by pain  [] Patient limited by other medical complications  [] Other:     Pain after treatment:      2/10    Prognosis: [x] Good [] Fair  [] Poor    Patient Requires Follow-up: [x] Yes  []

## 2024-10-29 ENCOUNTER — HOSPITAL ENCOUNTER (OUTPATIENT)
Dept: PHYSICAL THERAPY | Facility: HOSPITAL | Age: 49
Setting detail: THERAPIES SERIES
Discharge: HOME | End: 2024-10-29
Payer: MEDICAID

## 2024-10-29 PROCEDURE — 97110 THERAPEUTIC EXERCISES: CPT

## 2024-10-29 PROCEDURE — G0283 ELEC STIM OTHER THAN WOUND: HCPCS

## 2024-10-29 NOTE — FLOWSHEET NOTE
Physical Therapy Daily Treatment Note   Date:  10/29/2024    TIme In:    1427            Time Out:    1515    Patient Name:  Michaela Leon   :  1975  MRN: [id number]    Restrictions/Precautions:    Pertinent Medical History:  Medical/Treatment Diagnosis Information:  DDD (degenerative disc disease), cervical [M50.30]     Insurance/Certification information:  Payor: [employer] MEDICA[employer] / Plan: [facility] MEDICAID / Product Type: *No Product type* /   Physician Information:  Shira Gallegos MD  Plan of care signed (Y/N):    Visit# / total visits:     4 /    G-Code (if applicable):      Date / Visit # G-Code Applied:         Progress Note: []  Yes  [x]  No  Next due by: Visit #10       Pain level:   2/10    Subjective:  \"I'm good.\"     Objective:  Observation:   Test measurements:    Palpation:    Exercises:  Exercise Resistance/Repetitions Date performed   Sci fit Level 2    5 minutes 29   Pulleys 3 minutes each way 29   Scapular retraction elbows in Red tubing  15x 29   Scapular retraction elbows up Red tubing  15x 29   Standing shoulder extension Red tubing  15x 29   Wash cloth on wall 3-way 10x each way 29   Standing shoulder rolls back 20x 29   Doorway stretch 3x  holding 15 seconds 29   Seated elbow flexion 2# weight   20x 29   Seated elbow pro/supination 2# weight  20x 29   Supine shoulder flex 1# bar  10x slowly 29   Supine shoulder ABD 1# bar  10x slowly 29     Other Therapeutic Activities:      Manual Treatments:      Modalities:  E-stim with cold pack to cervical/upper trap muscles    Timed Code Treatment Minutes:  48    Total Treatment Minutes:  48    Treatment/Activity Tolerance:  [x] Patient tolerated treatment well [] Patient limited by fatigue  [] Patient limited by pain  [] Patient limited by other medical complications  [] Other:     Pain after treatment:      1/10    Prognosis: [x] Good [] Fair  [] Poor    Patient Requires Follow-up: [x] Yes  [] No    Plan:   [x] Continue per plan of

## 2024-10-31 ENCOUNTER — HOSPITAL ENCOUNTER (OUTPATIENT)
Dept: PHYSICAL THERAPY | Facility: HOSPITAL | Age: 49
Setting detail: THERAPIES SERIES
Discharge: HOME | End: 2024-10-31
Payer: MEDICAID

## 2024-10-31 PROCEDURE — 97110 THERAPEUTIC EXERCISES: CPT

## 2024-10-31 PROCEDURE — G0283 ELEC STIM OTHER THAN WOUND: HCPCS

## 2024-10-31 NOTE — FLOWSHEET NOTE
3/10    Prognosis: [x] Good [] Fair  [] Poor    Patient Requires Follow-up: [x] Yes  [] No    Plan:   [x] Continue per plan of care [] Alter current plan (see comments)  [] Plan of care initiated [] Hold pending MD visit [] Discharge    Plan for Next Session:        Electronically signed by:  Viktoria Almanzar PTA

## 2024-11-05 ENCOUNTER — HOSPITAL ENCOUNTER (OUTPATIENT)
Dept: PHYSICAL THERAPY | Facility: HOSPITAL | Age: 49
Setting detail: THERAPIES SERIES
Discharge: HOME | End: 2024-11-05
Payer: MEDICAID

## 2024-11-05 PROCEDURE — 97110 THERAPEUTIC EXERCISES: CPT

## 2024-11-05 PROCEDURE — G0283 ELEC STIM OTHER THAN WOUND: HCPCS

## 2024-11-05 NOTE — FLOWSHEET NOTE
Physical Therapy Daily Treatment Note   Date:  2024    TIme In:    1353          Time Out:    1438    Patient Name:  Michaela Leon   :  1975 MRN: [id number]    Restrictions/Precautions:    Pertinent Medical History:  Medical/Treatment Diagnosis Information:  DDD (degenerative disc disease), cervical [M50.30]     Insurance/Certification information:  Payor: [facility] [place] MEDICAID / Plan: [facility] MEDICAID / Product Type: *No Product type* /   Physician Information:  Shira Gallegos MD  Plan of care signed (Y/N):    Visit# / total visits:     6 /    G-Code (if applicable):      Date / Visit # G-Code Applied:         Progress Note: []  Yes  [x]  No  Next due by: Visit #10       Pain level:   2/10    Subjective:  Patient states she's doing ok and reports decreased pain today.     Objective:  Observation:   Test measurements:    Palpation:    Exercises:  Exercise Resistance/Repetitions Date performed   Sci fit Level 2    5 minutes 5   Pulleys 3 minutes each way 5   Scapular retraction elbows in Red tubing  15x 5   Scapular retraction elbows up Red tubing  15x 5   Standing shoulder extension Red tubing  15x 5   Standing shoulder rolls back 20x 5   Wash cloth on wall 3-way 10x each way 5   Doorway stretch 3x  holding 15 seconds 5   Seated elbow flexion 2# weight   20x 5   Seated elbow pro/supination 2# weight  20x 5   Supine shoulder flex 1# bar  10x slowly 5   Supine shoulder ABD 1# bar  10x slowly 5     Other Therapeutic Activities:      Manual Treatments:      Modalities:  E-stim with cold pack to cervical/upper trap muscles    Timed Code Treatment Minutes:  45    Total Treatment Minutes:  45    Treatment/Activity Tolerance:  [x] Patient tolerated treatment well [] Patient limited by fatigue  [] Patient limited by pain  [] Patient limited by other medical complications  [] Other:     Pain after treatment:      2/10    Prognosis: [x] Good [] Fair  [] Poor    Patient Requires Follow-up: [x] Yes  []

## 2024-11-07 ENCOUNTER — HOSPITAL ENCOUNTER (OUTPATIENT)
Dept: PHYSICAL THERAPY | Facility: HOSPITAL | Age: 49
Setting detail: THERAPIES SERIES
Discharge: HOME | End: 2024-11-07
Payer: MEDICAID

## 2024-11-07 PROCEDURE — G0283 ELEC STIM OTHER THAN WOUND: HCPCS

## 2024-11-07 PROCEDURE — 97110 THERAPEUTIC EXERCISES: CPT

## 2024-11-07 NOTE — FLOWSHEET NOTE
Physical Therapy Daily Treatment Note   Date:  2024    TIme In:    1422          Time Out:    1507    Patient Name:  Michaela Leon   :  1975 MRN: [id number]    Restrictions/Precautions:    Pertinent Medical History:  Medical/Treatment Diagnosis Information:  DDD (degenerative disc disease), cervical [M50.30]     Insurance/Certification information:  Payor: [employer] MEDICA[employer] / Plan: [facility] MEDICAID / Product Type: *No Product type* /   Physician Information:  Shira Gallegos MD  Plan of care signed (Y/N):    Visit# / total visits:     7 /    G-Code (if applicable):      Date / Visit # G-Code Applied:         Progress Note: []  Yes  [x]  No  Next due by: Visit #10       Pain level:   0/10 \"tightness\"    Subjective:  Patient offers no new c/o.  Reports doing HEP.     Objective:  Observation:   Test measurements:    Palpation:    Exercises:  Exercise Resistance/Repetitions Date performed   Sci fit Level 2    5 minutes 7   Pulleys 3 minutes each way 7   Scapular retraction elbows in Red tubing  15x 7   Scapular retraction elbows up Red tubing  15x 7   Standing shoulder extension Red tubing  15x 7   Standing shoulder rolls back 20x 7   Wash cloth on wall 3-way 10x each way 7   Doorway stretch 3x  holding 15 seconds 7   Seated elbow flexion 2# weight   20x 7   Seated elbow pro/supination 2# weight  20x 7   Supine shoulder flex 1# bar  10x slowly 7   Supine shoulder ABD 1# bar  10x slowly 7     Other Therapeutic Activities:      Manual Treatments:      Modalities:  E-stim with cold pack to cervical/upper trap muscles    Timed Code Treatment Minutes:  45    Total Treatment Minutes:  45    Treatment/Activity Tolerance:  [x] Patient tolerated treatment well [] Patient limited by fatigue  [] Patient limited by pain  [] Patient limited by other medical complications  [] Other:     Pain after treatment:      0/10    Prognosis: [x] Good [] Fair  [] Poor    Patient Requires Follow-up: [x] Yes  []

## 2024-11-12 ENCOUNTER — TELEPHONE (OUTPATIENT)
Dept: CARDIOLOGY | Facility: CLINIC | Age: 49
End: 2024-11-12
Payer: MEDICAID

## 2024-11-12 PROBLEM — Z79.899 HIGH RISK MEDICATION USE: Status: ACTIVE | Noted: 2024-11-12

## 2024-11-12 PROBLEM — K52.9 INFLAMMATORY BOWEL DISEASE: Status: ACTIVE | Noted: 2024-11-12

## 2024-11-12 NOTE — TELEPHONE ENCOUNTER
"LVM to reschedule follow up on 11/26 due to Pt not having Echo done yet, per referral numerous attempts have been made via phone call to schedule the echo. Okay for hub to relay  Relay     \"We were reaching out about your appointment with Dr Esquivel on 11/26/24. This appointment is a follow up for the Echo that Dawna Mcclure ordered but we have not been able to reach you to schedule the Echo. Please let us know if you would like to proceed with the Echo.\"                 "

## 2024-11-13 ENCOUNTER — HOSPITAL ENCOUNTER (OUTPATIENT)
Dept: PHYSICAL THERAPY | Facility: HOSPITAL | Age: 49
Setting detail: THERAPIES SERIES
Discharge: HOME | End: 2024-11-13
Payer: MEDICAID

## 2024-11-13 NOTE — CARE COORDINATION
[facility]  Cancel/No Show Note    Service:  [x]Physical Therapy  []Occupational Therapy    Date: 11/13/24    # of Missed Tx:     Reason For Missed Treatment:  []Illness  []Provider unavailable  []No call/no show  []Late cancellation (<24 hours)  []Arrived >15 minutes late  []Other appointment  []Scheduling conflict  []Arrived on wrong day  []Physician discharged tx  []Transportation conflict  []Inclement weather  []Frequency of tx change  [x]No reason given  []Arrived but refused participation  []Unable to tolerate  []Medical complications  []Pt on hold due to-  []Other-     Plan:  []Rescheduled  [x]Continue at next scheduled apt  []Reminder phone call  []Contacted referring provider  []Discharged

## 2024-11-14 ENCOUNTER — HOSPITAL ENCOUNTER (OUTPATIENT)
Dept: PHYSICAL THERAPY | Facility: HOSPITAL | Age: 49
Setting detail: THERAPIES SERIES
Discharge: HOME | End: 2024-11-14
Payer: MEDICAID

## 2024-11-14 PROCEDURE — 97035 APP MDLTY 1+ULTRASOUND EA 15: CPT | Performed by: PHYSICAL THERAPIST

## 2024-11-14 PROCEDURE — 97110 THERAPEUTIC EXERCISES: CPT | Performed by: PHYSICAL THERAPIST

## 2024-11-14 NOTE — FLOWSHEET NOTE
Goal(s): Patient's goal for physical therapy is to decrease the pain in his low back.  Short Term Goals Completed by 4 weeks Goal Status   Patient to be IND with HEP Met   Patient to decrease pain in neck to 2/10 Progressing   Patient to increase cervical AROM to WFL Progresssing   Patient to decrease neck index score to 30 Progressing                           Long Term Goals Completed by 8 weeks Goal Status   Patient to be IND with updated HEP for discharge Progressing   Patient to decrease pain in low back to 1/10 Progressing   Patient to increase cervical AROM to WNL Progressing   Patient to decrease neck index score to 20 Progressing                   Treatment/Activity Tolerance:  [x] Patient tolerated treatment well [] Patient limited by fatigue  [] Patient limited by pain  [] Patient limited by other medical complications  [] Other:     Pain after treatment:      0/10    Prognosis: [x] Good [] Fair  [] Poor    Patient Requires Follow-up: [x] Yes  [] No    Plan:   [x] Continue per plan of care [] Alter current plan (see comments)  [] Plan of care initiated [] Hold pending MD visit [] Discharge    Plan for Next Session:        Electronically signed by:  Marilou Patino PT

## 2024-11-19 ENCOUNTER — HOSPITAL ENCOUNTER (OUTPATIENT)
Dept: PHYSICAL THERAPY | Facility: HOSPITAL | Age: 49
Setting detail: THERAPIES SERIES
Discharge: HOME | End: 2024-11-19
Payer: MEDICAID

## 2024-11-19 NOTE — CARE COORDINATION
[facility]  Cancel/No Show Note    Service:  [x]Physical Therapy  []Occupational Therapy    Date: 11/19/24    # of Missed Tx:     Reason For Missed Treatment:  []Illness  []Provider unavailable  []No call/no show  []Late cancellation (<24 hours)  []Arrived >15 minutes late  []Other appointment  []Scheduling conflict  []Arrived on wrong day  []Physician discharged tx  []Transportation conflict  []Inclement weather  []Frequency of tx change  [x]No reason given  []Arrived but refused participation  []Unable to tolerate  []Medical complications  []Pt on hold due to-  []Other-     Plan:  []Rescheduled  [x]Continue at next scheduled apt  []Reminder phone call  []Contacted referring provider  []Discharged

## 2024-11-21 ENCOUNTER — HOSPITAL ENCOUNTER (OUTPATIENT)
Dept: PHYSICAL THERAPY | Facility: HOSPITAL | Age: 49
Setting detail: THERAPIES SERIES
Discharge: HOME | End: 2024-11-21
Payer: MEDICAID

## 2024-11-21 PROCEDURE — 97110 THERAPEUTIC EXERCISES: CPT

## 2024-11-21 PROCEDURE — 97140 MANUAL THERAPY 1/> REGIONS: CPT

## 2024-11-21 PROCEDURE — 97035 APP MDLTY 1+ULTRASOUND EA 15: CPT

## 2024-11-21 NOTE — FLOWSHEET NOTE
Physical Therapy Daily Treatment Note  Date:  2024    TIme In:    1554          Time Out:    1640    Patient Name:  Michaela Leon   :  1975 MRN: [id number]    Restrictions/Precautions:    Pertinent Medical History:  Medical/Treatment Diagnosis Information:  DDD (degenerative disc disease), cervical [M50.30]     Insurance/Certification information:  Payor: [employer] MEDICAID / Plan: [facility] MEDICAID / Product Type: *No Product type* /   Physician Information:  Shira Gallegos MD  Plan of care signed (Y/N):    Visit# / total visits:     9 /    G-Code (if applicable):      Date / Visit # G-Code Applied:         Progress Note: []  Yes  [x]  No  Next due by: Visit #10       Pain level:   3/10     Subjective:  Patient states that she was hurting bad yesterday but is better today.     Objective:  Observation:   Test measurements:   Palpation:    Exercises:  Exercise Resistance/Repetitions Date performed   Sci fit Level 2    5 minutes 21   Pulleys 3 minutes each way 21   Scapular retraction elbows in Red tubing  15x 21   Scapular retraction elbows up Red tubing  15x 21   Standing shoulder extension Red tubing  15x 21   Standing shoulder rolls back 20x 21   Wash cloth on wall 3-way 10x each way 21   Doorway stretch 3x  holding 15 seconds 21   Seated elbow flexion 2# weight   20x 21   Seated elbow pro/supination 2# weight  20x 21   Supine shoulder flex 1# bar  10x slowly 21   Supine shoulder ABD 1# bar  10x slowly 21     Other Therapeutic Activities:      Manual Treatments:  STM with Biofreeze to B upper traps and neck    Modalities:  E-stim with cold pack to cervical/upper trap muscles (deferred today)                       US 1.5 w/cm2  8 min    Timed Code Treatment Minutes:  46    Total Treatment Minutes:  46      Treatment/Activity Tolerance:  [x] Patient tolerated treatment well [] Patient limited by fatigue  [] Patient limited by pain  [] Patient limited by other medical complications  [] Other:

## 2024-11-26 ENCOUNTER — HOSPITAL ENCOUNTER (OUTPATIENT)
Dept: PHYSICAL THERAPY | Facility: HOSPITAL | Age: 49
Setting detail: THERAPIES SERIES
Discharge: HOME | End: 2024-11-26
Payer: MEDICAID

## 2024-11-26 PROCEDURE — 97110 THERAPEUTIC EXERCISES: CPT

## 2024-11-26 PROCEDURE — 97035 APP MDLTY 1+ULTRASOUND EA 15: CPT

## 2024-11-26 NOTE — FLOWSHEET NOTE
Physical Therapy Daily Treatment Note  Date:  2024    TIme In:    1556          Time Out:    1638    Patient Name:  Michaela Leon   :  1975 MRN: [id number]    Restrictions/Precautions:    Pertinent Medical History:  Medical/Treatment Diagnosis Information:  DDD (degenerative disc disease), cervical [M50.30]     Insurance/Certification information:  Payor: [facility] [place] MEDICAID / Plan: [facility] MEDICAID / Product Type: *No Product type* /   Physician Information:  Shira Gallegos MD  Plan of care signed (Y/N):    Visit# / total visits:     10 /    G-Code (if applicable):      Date / Visit # G-Code Applied:         Progress Note: []  Yes  [x]  No  Next due by: Visit #10       Pain level:   2/10     Subjective:  Patient reports doing ok today with slightly less pain.     Objective:  Observation:   Test measurements:   Palpation:    Exercises:  Exercise Resistance/Repetitions Date performed   Sci fit Level 2    5 minutes 26   Pulleys 3 minutes each way 26   Scapular retraction elbows in Red tubing  15x 26   Scapular retraction elbows up Red tubing  15x 26   Standing shoulder extension Red tubing  15x 26   Standing shoulder rolls back 20x 26   Wash cloth on wall 3-way 10x each way 26   Doorway stretch 3x  holding 15 seconds 26   Seated elbow flexion 2# weight   20x 26   Seated elbow pro/supination 2# weight  20x 26   Supine shoulder flex 1# bar  10x slowly 26   Supine shoulder ABD 1# bar  10x slowly 26     Other Therapeutic Activities:      Manual Treatments:  STM with Biofreeze to B upper traps and neck    Modalities:  E-stim with cold pack to cervical/upper trap muscles (deferred today)                       US 1.5 w/cm2  8 min    Timed Code Treatment Minutes:  42    Total Treatment Minutes:  42      Treatment/Activity Tolerance:  [x] Patient tolerated treatment well [] Patient limited by fatigue  [] Patient limited by pain  [] Patient limited by other medical complications  [] Other:     Pain after

## 2024-12-03 ENCOUNTER — HOSPITAL ENCOUNTER (OUTPATIENT)
Dept: PHYSICAL THERAPY | Facility: HOSPITAL | Age: 49
Setting detail: THERAPIES SERIES
Discharge: HOME | End: 2024-12-03
Payer: MEDICAID

## 2024-12-03 PROCEDURE — 97110 THERAPEUTIC EXERCISES: CPT

## 2024-12-03 PROCEDURE — 97035 APP MDLTY 1+ULTRASOUND EA 15: CPT

## 2024-12-03 NOTE — FLOWSHEET NOTE
Physical Therapy Daily Treatment Note  Date:  12/3/2024    TIme In:   1453               Time Out:   1538    Patient Name:  April Sharmila Leon   :  1975 MRN: [id number]    Restrictions/Precautions:    Pertinent Medical History:  Medical/Treatment Diagnosis Information:  DDD (degenerative disc disease), cervical [M50.30]     Insurance/Certification information:  Payor: [facility] MEDICAID / Plan: [facility] MEDICAID / Product Type: *No Product type* /   Physician Information:  Shira Gallegos MD  Plan of care signed (Y/N):    Visit# / total visits:     11 /    G-Code (if applicable):      Date / Visit # G-Code Applied:         Progress Note: []  Yes  [x]  No  Next due by: Visit #10       Pain level:   2/10     Subjective:   Patient states she's doing about the same with her pain. Reports she missed her rheumatologist appointment and is rescheduled for February.     Objective:  Observation:   Test measurements:   Palpation:    Exercises:  Exercise Resistance/Repetitions Date performed   Sci fit Level 2    5 minutes 3   Pulleys 3 minutes each way 3   Scapular retraction elbows in Red tubing  15x 3   Scapular retraction elbows up Red tubing  15x 3   Standing shoulder extension Red tubing  15x 3   Standing shoulder rolls back 20x 3   Wash cloth on wall 3-way 10x each way 3   Doorway stretch 3x  holding 15 seconds 3   Seated elbow flexion 2# weight   20x 3   Seated elbow pro/supination 2# weight  20x 3   Supine shoulder flex 1# bar  10x slowly 3   Supine shoulder ABD 1# bar  10x slowly 3     Other Therapeutic Activities:      Manual Treatments:  STM with Biofreeze to B upper traps and neck    Modalities:  E-stim with cold pack to cervical/upper trap muscles (deferred today)                       US 1.5 w/cm2  5 min each side    Timed Code Treatment Minutes:  45    Total Treatment Minutes:  45      Treatment/Activity Tolerance:  [x] Patient tolerated treatment well [] Patient limited by fatigue  [] Patient limited

## 2025-01-07 ENCOUNTER — OFFICE VISIT (OUTPATIENT)
Dept: CARDIOLOGY | Facility: CLINIC | Age: 50
End: 2025-01-07
Payer: MEDICAID

## 2025-01-07 VITALS
HEART RATE: 100 BPM | WEIGHT: 243 LBS | OXYGEN SATURATION: 98 % | SYSTOLIC BLOOD PRESSURE: 144 MMHG | BODY MASS INDEX: 40.48 KG/M2 | HEIGHT: 65 IN | DIASTOLIC BLOOD PRESSURE: 88 MMHG

## 2025-01-07 DIAGNOSIS — I87.8 CHRONIC VENOUS STASIS: ICD-10-CM

## 2025-01-07 DIAGNOSIS — I10 ESSENTIAL HYPERTENSION: Primary | ICD-10-CM

## 2025-01-07 DIAGNOSIS — E66.01 MORBID OBESITY: ICD-10-CM

## 2025-01-07 PROBLEM — R00.2 PALPITATIONS: Status: RESOLVED | Noted: 2017-08-04 | Resolved: 2025-01-07

## 2025-01-07 PROBLEM — R07.2 PRECORDIAL PAIN: Status: RESOLVED | Noted: 2017-08-04 | Resolved: 2025-01-07

## 2025-01-07 RX ORDER — DULOXETIN HYDROCHLORIDE 60 MG/1
1 CAPSULE, DELAYED RELEASE ORAL DAILY
COMMUNITY
Start: 2024-09-25

## 2025-01-07 RX ORDER — LOSARTAN POTASSIUM 100 MG/1
100 TABLET ORAL DAILY
Qty: 90 TABLET | Refills: 3 | Status: SHIPPED | OUTPATIENT
Start: 2025-01-07

## 2025-01-07 RX ORDER — METHYLPREDNISOLONE 4 MG/1
TABLET ORAL
COMMUNITY
Start: 2025-01-02

## 2025-01-07 RX ORDER — BUSPIRONE HYDROCHLORIDE 30 MG/1
1 TABLET ORAL EVERY 12 HOURS SCHEDULED
COMMUNITY
Start: 2024-09-25

## 2025-01-07 RX ORDER — MIRTAZAPINE 45 MG/1
45 TABLET, FILM COATED ORAL
COMMUNITY
Start: 2025-01-04

## 2025-01-07 RX ORDER — BUPROPION HYDROCHLORIDE 150 MG/1
1 TABLET ORAL DAILY
COMMUNITY
Start: 2024-11-06

## 2025-01-07 NOTE — PROGRESS NOTES
Subjective:     Encounter Date:01/07/2025      Patient ID: April Judy Nicolas is a 49 y.o. female.    Chief Complaint: Leg swelling  HPI  This is a 49-year-old female patient who presents to cardiology clinic to discuss the results of outpatient testing.  The patient has been experiencing swelling of her bilateral feet and ankles primarily on the right side.  This has persisted despite diuretic therapy.  The patient underwent an echocardiogram showing normal left ventricular systolic function globally and regionally.  Left ventricular diastolic function was normal.  There was no evidence of cardiac chamber enlargement or ventricular hypertrophy.  There is no evidence of valvular heart disease, pulmonary hypertension or pericardial disease.  The patient reports that her swelling generally improves overnight but never completely resolves.  She indicates that when she has been standing for prolonged periods of time she has increased swelling of her feet and ankles primarily on the right.  The following portions of the patient's history were reviewed and updated as appropriate: allergies, current medications, past family history, past medical history, past social history, past surgical history and problem  Review of Systems   Constitutional: Negative for chills, diaphoresis, fever, malaise/fatigue, weight gain and weight loss.   HENT:  Negative for ear discharge, hearing loss, hoarse voice and nosebleeds.    Eyes:  Negative for discharge, double vision, pain and photophobia.   Cardiovascular:  Positive for leg swelling. Negative for chest pain, claudication, cyanosis, dyspnea on exertion, irregular heartbeat, near-syncope, orthopnea, palpitations, paroxysmal nocturnal dyspnea and syncope.   Respiratory:  Negative for cough, hemoptysis, sputum production and wheezing.    Endocrine: Negative for cold intolerance, heat intolerance, polydipsia, polyphagia and polyuria.   Hematologic/Lymphatic: Negative for adenopathy  and bleeding problem. Does not bruise/bleed easily.   Skin:  Negative for color change, flushing, itching and rash.   Musculoskeletal:  Negative for muscle cramps, muscle weakness, myalgias and stiffness.   Gastrointestinal:  Negative for abdominal pain, diarrhea, hematemesis, hematochezia, nausea and vomiting.   Genitourinary:  Negative for dysuria, frequency and nocturia.   Neurological:  Negative for focal weakness, loss of balance, numbness, paresthesias and seizures.   Psychiatric/Behavioral:  Negative for altered mental status, hallucinations and suicidal ideas.    Allergic/Immunologic: Negative for HIV exposure, hives and persistent infections.           Current Outpatient Medications:     amLODIPine (NORVASC) 5 MG tablet, , Disp: , Rfl:     atorvastatin (LIPITOR) 20 MG tablet, 2 tablets., Disp: , Rfl:     buPROPion XL (WELLBUTRIN XL) 150 MG 24 hr tablet, Take 1 tablet by mouth Daily., Disp: , Rfl:     busPIRone (BUSPAR) 30 MG tablet, Take 1 tablet by mouth Every 12 (Twelve) Hours., Disp: , Rfl:     cyclobenzaprine (FLEXERIL) 10 MG tablet, Take 1 tablet by mouth 3 (Three) Times a Day As Needed for Muscle Spasms., Disp: 90 tablet, Rfl: 4    divalproex (DEPAKOTE) 500 MG 24 hr tablet, Take 1 tablet by mouth Every Night., Disp: 90 tablet, Rfl: 1    DULoxetine (CYMBALTA) 60 MG capsule, Take 1 capsule by mouth Daily., Disp: , Rfl:     folic acid (FOLVITE) 1 MG tablet, TAKE 1 TABLET BY MOUTH WITH FOOD, Disp: , Rfl:     furosemide (LASIX) 40 MG tablet, Take 1 tablet by mouth Daily., Disp: , Rfl:     glycopyrrolate (ROBINUL) 1 MG tablet, Take 1 tablet by mouth 3 (Three) Times a Day., Disp: , Rfl:     hydrOXYzine (ATARAX) 10 MG tablet, Take 1 tablet by mouth 3 (Three) Times a Day As Needed for Itching., Disp: , Rfl:     mesalamine (LIALDA) 1.2 g EC tablet, TAKE 2 TABLETS BY MOUTH ONE TIME A DAY, Disp: , Rfl: 1    metFORMIN (GLUCOPHAGE) 500 MG tablet, Take 1 tablet by mouth 2 (Two) Times a Day With Meals., Disp: , Rfl:  "    methylPREDNISolone (MEDROL) 4 MG dose pack, follow package directions, Disp: , Rfl:     mirtazapine (REMERON) 45 MG tablet, Take 1 tablet by mouth every night at bedtime., Disp: , Rfl:     Ustekinumab (STELARA) 90 MG/ML solution prefilled syringe Injection, Inject  under the skin into the appropriate area as directed. Every 8 weeks, Disp: , Rfl:     Objective:   Vitals and nursing note reviewed.   Constitutional:       Appearance: Healthy appearance. Not in distress.   Neck:      Vascular: No JVR. JVD normal.   Pulmonary:      Effort: Pulmonary effort is normal.      Breath sounds: Normal breath sounds. No wheezing. No rhonchi. No rales.   Chest:      Chest wall: Not tender to palpatation.   Cardiovascular:      PMI at left midclavicular line. Normal rate. Regular rhythm. Normal S1. Normal S2.       Murmurs: There is no murmur.      No gallop.  No click. No rub.   Pulses:     Intact distal pulses.   Edema:     Peripheral edema present.  Abdominal:      General: Bowel sounds are normal.      Palpations: Abdomen is soft.      Tenderness: There is no abdominal tenderness.   Musculoskeletal: Normal range of motion.         General: No tenderness. Skin:     General: Skin is warm and dry.   Neurological:      General: No focal deficit present.      Mental Status: Alert and oriented to person, place and time.       Blood pressure 144/88, pulse 100, height 165.1 cm (65\"), weight 110 kg (243 lb), SpO2 98%.   Lab Review:     Assessment:       1. Essential hypertension  Poor blood pressure control.    2. Chronic venous stasis  Typical chronic venous stasis change.  Typical edema of chronic venous insufficiency.    3. Morbid obesity  Body mass index is now increased to greater than 40.  The obesity pattern is central.  This is due to excess calorie intake.    Procedures    Plan:     I have recommended starting losartan 100 mg orally once per day.    No changes have been made to her amlodipine or diuretic medications.    The " "patient has been counseled to discontinue the use of Celebrex and Mobic.  She has been counseled to avoid all prescription and over-the-counter NSAIDs.    The patient has been referred to the lymphedema clinic in [facility] as there is no evidence that her swelling is due to \"congestive heart failure\".    The patient has been reassured regarding the benign nature of her cardiac test results.    No further cardiovascular testing is warranted.    The patient's primary care provider will manage her hypertension going forward.      "

## 2025-01-22 ENCOUNTER — HOSPITAL ENCOUNTER (OUTPATIENT)
Facility: HOSPITAL | Age: 50
Discharge: HOME | End: 2025-01-22
Payer: MEDICAID

## 2025-01-22 PROCEDURE — 83993 ASSAY FOR CALPROTECTIN FECAL: CPT

## 2025-01-22 PROCEDURE — 87328 CRYPTOSPORIDIUM AG IA: CPT

## 2025-01-22 PROCEDURE — 87506 IADNA-DNA/RNA PROBE TQ 6-11: CPT

## 2025-01-22 PROCEDURE — 82653 EL-1 FECAL QUANTITATIVE: CPT

## 2025-01-22 PROCEDURE — 82705 FATS/LIPIDS FECES QUAL: CPT

## 2025-01-23 LAB
CRYPTOSP AG STL QL IA: NORMAL
G LAMBLIA AG STL QL IA: NORMAL
GI PATHOGENS PNL STL NAA+PROBE: NORMAL

## 2025-01-24 LAB
CALPROTECTIN STL-MCNT: 709 UG/G
ELASTASE PANC STL-MCNT: 123 UG/G
FAT STL QL: NORMAL
NEUTRAL FAT STL QL: NORMAL

## 2025-02-12 ENCOUNTER — OFFICE VISIT (OUTPATIENT)
Age: 50
End: 2025-02-12
Payer: MEDICAID

## 2025-02-12 ENCOUNTER — TELEPHONE (OUTPATIENT)
Age: 50
End: 2025-02-12

## 2025-02-12 ENCOUNTER — LAB (OUTPATIENT)
Facility: HOSPITAL | Age: 50
End: 2025-02-12
Payer: MEDICAID

## 2025-02-12 VITALS
WEIGHT: 242 LBS | HEART RATE: 102 BPM | DIASTOLIC BLOOD PRESSURE: 88 MMHG | SYSTOLIC BLOOD PRESSURE: 148 MMHG | HEIGHT: 65 IN | BODY MASS INDEX: 40.32 KG/M2 | TEMPERATURE: 98 F

## 2025-02-12 DIAGNOSIS — M25.552 LEFT HIP PAIN: ICD-10-CM

## 2025-02-12 DIAGNOSIS — M79.7 FIBROMYALGIA: ICD-10-CM

## 2025-02-12 DIAGNOSIS — Z79.899 HIGH RISK MEDICATION USE: ICD-10-CM

## 2025-02-12 DIAGNOSIS — M06.00 RHEUMATOID ARTHRITIS WITH NEGATIVE RHEUMATOID FACTOR, INVOLVING UNSPECIFIED SITE: Primary | ICD-10-CM

## 2025-02-12 DIAGNOSIS — M06.00 RHEUMATOID ARTHRITIS WITH NEGATIVE RHEUMATOID FACTOR, INVOLVING UNSPECIFIED SITE: ICD-10-CM

## 2025-02-12 DIAGNOSIS — Z85.820 HISTORY OF MELANOMA: ICD-10-CM

## 2025-02-12 DIAGNOSIS — M19.90 OSTEOARTHRITIS, UNSPECIFIED OSTEOARTHRITIS TYPE, UNSPECIFIED SITE: ICD-10-CM

## 2025-02-12 LAB
ANISOCYTOSIS BLD QL: ABNORMAL
BASOPHILS # BLD MANUAL: 0.09 10*3/MM3 (ref 0–0.2)
BASOPHILS NFR BLD MANUAL: 1 % (ref 0–1.5)
DEPRECATED RDW RBC AUTO: 44.1 FL (ref 37–54)
EOSINOPHIL # BLD MANUAL: 0.26 10*3/MM3 (ref 0–0.4)
EOSINOPHIL NFR BLD MANUAL: 3 % (ref 0.3–6.2)
ERYTHROCYTE [DISTWIDTH] IN BLOOD BY AUTOMATED COUNT: 13 % (ref 12.3–15.4)
ERYTHROCYTE [SEDIMENTATION RATE] IN BLOOD: 27 MM/HR (ref 0–20)
HCT VFR BLD AUTO: 43.4 % (ref 34–46.6)
HGB BLD-MCNC: 14.1 G/DL (ref 12–15.9)
LYMPHOCYTES # BLD MANUAL: 1.24 10*3/MM3 (ref 0.7–3.1)
LYMPHOCYTES NFR BLD MANUAL: 6.1 % (ref 5–12)
MCH RBC QN AUTO: 30.3 PG (ref 26.6–33)
MCHC RBC AUTO-ENTMCNC: 32.5 G/DL (ref 31.5–35.7)
MCV RBC AUTO: 93.1 FL (ref 79–97)
MICROCYTES BLD QL: ABNORMAL
MONOCYTES # BLD: 0.53 10*3/MM3 (ref 0.1–0.9)
NEUTROPHILS # BLD AUTO: 6.65 10*3/MM3 (ref 1.7–7)
NEUTROPHILS NFR BLD MANUAL: 75.8 % (ref 42.7–76)
PLAT MORPH BLD: NORMAL
PLATELET # BLD AUTO: 271 10*3/MM3 (ref 140–450)
PMV BLD AUTO: 10.2 FL (ref 6–12)
RBC # BLD AUTO: 4.66 10*6/MM3 (ref 3.77–5.28)
VARIANT LYMPHS NFR BLD MANUAL: 1 % (ref 0–5)
VARIANT LYMPHS NFR BLD MANUAL: 13.1 % (ref 19.6–45.3)
WBC MORPH BLD: NORMAL
WBC NRBC COR # BLD AUTO: 8.77 10*3/MM3 (ref 3.4–10.8)

## 2025-02-12 PROCEDURE — 85027 COMPLETE CBC AUTOMATED: CPT

## 2025-02-12 PROCEDURE — 36415 COLL VENOUS BLD VENIPUNCTURE: CPT

## 2025-02-12 PROCEDURE — 80053 COMPREHEN METABOLIC PANEL: CPT

## 2025-02-12 PROCEDURE — 86140 C-REACTIVE PROTEIN: CPT

## 2025-02-12 PROCEDURE — 85007 BL SMEAR W/DIFF WBC COUNT: CPT

## 2025-02-12 PROCEDURE — 85652 RBC SED RATE AUTOMATED: CPT

## 2025-02-12 RX ORDER — BACLOFEN 10 MG/1
10 TABLET ORAL 3 TIMES DAILY PRN
Qty: 90 TABLET | Refills: 5 | Status: SHIPPED | OUTPATIENT
Start: 2025-02-12

## 2025-02-12 RX ORDER — PANCRELIPASE 36000; 180000; 114000 [USP'U]/1; [USP'U]/1; [USP'U]/1
CAPSULE, DELAYED RELEASE PELLETS ORAL
COMMUNITY
Start: 2025-01-29

## 2025-02-12 NOTE — ASSESSMENT & PLAN NOTE
At last visit Flexeril was increased to 10mg TID.  No improvement.  Stop flexeril and trial baclofen 10mg tid prn muscle pain  Continue Duloxetine

## 2025-02-12 NOTE — ASSESSMENT & PLAN NOTE
Significant tenderness and swelling of the MCPs, ankles, and MTPs were observed during the examination. Negative results were obtained for 14.3.3 eta, rheumatoid factor, CCP, and ELÍAS in November 2023.   September 2024: SSA and SSB negative.  Inflammatory markers within normal range.  20 factor x 3 negative.  September 20 24 foot x-ray:1.Bilateral plantar calcaneal spurs   2.Evidence for some degenerative change involving the first metatarsophalangeal joint spaces a little greater on the left.   3.Asymmetric soft tissue fat about the forefoot on the right as compared to the left of questionable significance   September 2024 cervical spine x-ray:  Vertebral body heights are maintained without evidence of acute fracture and alignment is anatomic without evidence of listhesis or subluxation. Multilevel spondylosis change is present, with areas of disc osteophyte complex formation and facet   arthropathy, most notable at C5-6 and C6-7. The prevertebral soft tissues are normal. The dens is intact.   September 2023 left hip x-ray:Lucency through the greater trochanter is seen on the frog lateral view only, possibly corticated. There is no definite acute osseous injury but could be further evaluated on MRI if there is clinical concern   Historically she has tried/failed plaquenil (did not work), sulfasalazine (unsure why she stopped) and methotrexate (did not help)    She is currently off any disease modifier therapy and can continue with Celebrex as she tolerates it with her colon disease.   We discussed adding sulfasalazine back vs asking Dr. Jamison about stopping Stelara and using TNFi or Julisa.  She has remote history (1999) of melanoma.  No CHF, demyelinating brain disease, blood clots, heart attack, liver disease (aside from fatty liver) and diverticuli.  She does have elevated cholesterol.  Waiting to hear back from Dr. Jamison.

## 2025-02-12 NOTE — ASSESSMENT & PLAN NOTE
Feet.    Xray hands, shoulders and knees today.  She has chronic joint pain.    I spent 44 minutes total in patient care.  Patient care activities included reviewing and updating chart, ordering labs and MRI, answering questions, requesting consulting information from GI, performing history and physical and prescribing medication.

## 2025-02-12 NOTE — ASSESSMENT & PLAN NOTE
She is currently on Stelara and Lialda for the treatment of her inflammatory bowel disease. Collaboration with GI is necessary to formulate a treatment plan.

## 2025-02-12 NOTE — TELEPHONE ENCOUNTER
Please contact Dr. Jamison and see if they are okay with us adding sulfasalazine for RA treatment?  If not we may need to look at changing Stelara to a Dawna or TNFi.   [FreeTextEntry1] : 1.  No additional cardiac testing at this time.\par 2.  Change lisinopril to telmisartan HCT 80/25 mg daily for hypertension.\par 3.  Increase atorvastatin to 40 mg daily.  Repeat blood work in 4 to 6 weeks.\par 4.  Monitor BP at home, keep a log and bring to f/u.  He will bring his machine in to be calibrated as well.\par 5.  Patient encouraged to work on diet to lose weight.\par 6.  Patient is encouraged to exercise at least 30 minutes a day everyday of the week.\par 7.  Follow up here in 2 months.

## 2025-02-12 NOTE — ASSESSMENT & PLAN NOTE
September 2023 left hip x-ray:Lucency through the greater trochanter is seen on the frog lateral view only, possibly corticated. There is no definite acute osseous injury.  September 2023 an MRI was ordered, but not completed    MRI ordered

## 2025-02-13 LAB
ALBUMIN SERPL-MCNC: 4.4 G/DL (ref 3.5–5.2)
ALBUMIN/GLOB SERPL: 1.5 G/DL
ALP SERPL-CCNC: 119 U/L (ref 39–117)
ALT SERPL W P-5'-P-CCNC: 61 U/L (ref 1–33)
ANION GAP SERPL CALCULATED.3IONS-SCNC: 11 MMOL/L (ref 5–15)
AST SERPL-CCNC: 55 U/L (ref 1–32)
BILIRUB SERPL-MCNC: 0.3 MG/DL (ref 0–1.2)
BUN SERPL-MCNC: 13 MG/DL (ref 6–20)
BUN/CREAT SERPL: 17.3 (ref 7–25)
CALCIUM SPEC-SCNC: 10 MG/DL (ref 8.6–10.5)
CHLORIDE SERPL-SCNC: 105 MMOL/L (ref 98–107)
CO2 SERPL-SCNC: 24 MMOL/L (ref 22–29)
CREAT SERPL-MCNC: 0.75 MG/DL (ref 0.57–1)
CRP SERPL-MCNC: 0.46 MG/DL (ref 0–0.5)
EGFRCR SERPLBLD CKD-EPI 2021: 98.3 ML/MIN/1.73
GLOBULIN UR ELPH-MCNC: 3 GM/DL
GLUCOSE SERPL-MCNC: 112 MG/DL (ref 65–99)
POTASSIUM SERPL-SCNC: 4.3 MMOL/L (ref 3.5–5.2)
PROT SERPL-MCNC: 7.4 G/DL (ref 6–8.5)
SODIUM SERPL-SCNC: 140 MMOL/L (ref 136–145)

## 2025-03-07 ENCOUNTER — HOSPITAL ENCOUNTER (OUTPATIENT)
Dept: MRI IMAGING | Facility: HOSPITAL | Age: 50
Discharge: HOME | End: 2025-03-07
Payer: MEDICAID

## 2025-03-07 DIAGNOSIS — M25.552 LEFT HIP PAIN: ICD-10-CM

## 2025-03-07 DIAGNOSIS — M06.00 RHEUMATOID ARTHRITIS WITH NEGATIVE RHEUMATOID FACTOR, INVOLVING UNSPECIFIED SITE: ICD-10-CM

## 2025-03-07 PROCEDURE — A9577 INJ MULTIHANCE: HCPCS | Performed by: NURSE PRACTITIONER

## 2025-03-07 PROCEDURE — 73723 MRI JOINT LWR EXTR W/O&W/DYE: CPT

## 2025-03-07 PROCEDURE — 25510000002 GADOBENATE DIMEGLUMINE 529 MG/ML SOLUTION: Performed by: NURSE PRACTITIONER

## 2025-03-07 RX ADMIN — GADOBENATE DIMEGLUMINE 20 ML: 529 INJECTION, SOLUTION INTRAVENOUS at 15:38

## 2025-03-12 ENCOUNTER — RESULTS FOLLOW-UP (OUTPATIENT)
Dept: MRI IMAGING | Facility: HOSPITAL | Age: 50
End: 2025-03-12
Payer: MEDICAID

## 2025-03-14 ENCOUNTER — TELEPHONE (OUTPATIENT)
Age: 50
End: 2025-03-14
Payer: MEDICAID

## 2025-03-14 NOTE — TELEPHONE ENCOUNTER
Hub staff attempted to follow warm transfer process and was unsuccessful     Caller: Vanna Nicolas    Relationship to patient: Self    Best call back number: [phone]     Patient is needing: PT CALLING BACK FOR TEST RESULTS PLEASE CALL TO TELL HER THEM. AVAILABLE ANYTIME TODAY

## 2025-03-17 DIAGNOSIS — M47.816 OSTEOARTHRITIS OF LUMBAR SPINE, UNSPECIFIED SPINAL OSTEOARTHRITIS COMPLICATION STATUS: Primary | ICD-10-CM

## 2025-03-17 NOTE — TELEPHONE ENCOUNTER
Called patient to provide MRI and labs results. Patient stated understanding and would like for [facility] to order MRI of lumbar in regards of her hip pain.

## 2025-03-28 ENCOUNTER — HOSPITAL ENCOUNTER (OUTPATIENT)
Dept: MRI IMAGING | Facility: HOSPITAL | Age: 50
Discharge: HOME | End: 2025-03-28
Payer: MEDICAID

## 2025-03-28 DIAGNOSIS — M47.816 OSTEOARTHRITIS OF LUMBAR SPINE, UNSPECIFIED SPINAL OSTEOARTHRITIS COMPLICATION STATUS: ICD-10-CM

## 2025-03-28 LAB
BUN SERPL-MCNC: 11 MG/DL (ref 6–20)
CREAT SERPL-MCNC: 0.7 MG/DL (ref 0.4–1.2)
GFR SERPLBLD CREATININE-BSD FMLA CKD-EPI: >90 ML/MIN/{1.73_M2}

## 2025-03-28 PROCEDURE — 84520 ASSAY OF UREA NITROGEN: CPT

## 2025-03-28 PROCEDURE — 36415 COLL VENOUS BLD VENIPUNCTURE: CPT

## 2025-03-28 PROCEDURE — 82565 ASSAY OF CREATININE: CPT

## 2025-03-28 PROCEDURE — 6360000004 HC RX CONTRAST MEDICATION: Performed by: NURSE PRACTITIONER

## 2025-03-28 PROCEDURE — 72158 MRI LUMBAR SPINE W/O & W/DYE: CPT

## 2025-03-28 PROCEDURE — A9579 GAD-BASE MR CONTRAST NOS,1ML: HCPCS | Performed by: NURSE PRACTITIONER

## 2025-03-28 RX ADMIN — GADOTERIDOL 20 ML: 279.3 INJECTION, SOLUTION INTRAVENOUS at 15:25

## 2025-04-01 ENCOUNTER — TELEPHONE (OUTPATIENT)
Age: 50
End: 2025-04-01
Payer: MEDICAID

## 2025-04-01 ENCOUNTER — RESULTS FOLLOW-UP (OUTPATIENT)
Age: 50
End: 2025-04-01
Payer: MEDICAID

## 2025-04-01 DIAGNOSIS — M47.816 OSTEOARTHRITIS OF LUMBAR SPINE, UNSPECIFIED SPINAL OSTEOARTHRITIS COMPLICATION STATUS: Primary | ICD-10-CM

## 2025-04-01 NOTE — TELEPHONE ENCOUNTER
-I would start with referral to neurosurgery now for degenerative arthritis lumbar spine seen on imaging  -Her clinical picture and labs do not indicate septic arthritis or cellulitis with minimally elevated sed rate and normal white blood cell count

## 2025-04-01 NOTE — TELEPHONE ENCOUNTER
Can you please look at her MRI result and let me know who to send her to?  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  This MRI is suggestive of cellulitis and cannot rule out septic arthritis.  She has not been sick. Labs in Feb were unremarkable aside from sed rate of 27.  Thanks!

## 2025-05-14 ENCOUNTER — OFFICE VISIT (OUTPATIENT)
Dept: NEUROSURGERY | Facility: CLINIC | Age: 50
End: 2025-05-14
Payer: MEDICAID

## 2025-05-14 VITALS
WEIGHT: 242 LBS | BODY MASS INDEX: 40.32 KG/M2 | DIASTOLIC BLOOD PRESSURE: 128 MMHG | HEIGHT: 65 IN | TEMPERATURE: 98 F | SYSTOLIC BLOOD PRESSURE: 164 MMHG

## 2025-05-14 DIAGNOSIS — G89.29 CHRONIC MIDLINE LOW BACK PAIN WITHOUT SCIATICA: ICD-10-CM

## 2025-05-14 DIAGNOSIS — M47.816 LUMBAR FACET ARTHROPATHY: ICD-10-CM

## 2025-05-14 DIAGNOSIS — M54.50 CHRONIC MIDLINE LOW BACK PAIN WITHOUT SCIATICA: ICD-10-CM

## 2025-05-14 DIAGNOSIS — M06.00 RHEUMATOID ARTHRITIS WITH NEGATIVE RHEUMATOID FACTOR, INVOLVING UNSPECIFIED SITE: ICD-10-CM

## 2025-05-14 DIAGNOSIS — M25.552 LEFT HIP PAIN: Primary | ICD-10-CM

## 2025-05-14 PROCEDURE — 99203 OFFICE O/P NEW LOW 30 MIN: CPT | Performed by: PHYSICIAN ASSISTANT

## 2025-05-14 PROCEDURE — 3077F SYST BP >= 140 MM HG: CPT | Performed by: PHYSICIAN ASSISTANT

## 2025-05-14 PROCEDURE — 3080F DIAST BP >= 90 MM HG: CPT | Performed by: PHYSICIAN ASSISTANT

## 2025-05-14 RX ORDER — CELECOXIB 200 MG/1
1 CAPSULE ORAL DAILY
COMMUNITY
Start: 2025-03-25

## 2025-05-14 RX ORDER — ESTRADIOL 2 MG/1
1 TABLET ORAL DAILY
COMMUNITY
Start: 2025-03-07

## 2025-05-14 RX ORDER — CYCLOBENZAPRINE HCL 10 MG
10 TABLET ORAL 3 TIMES DAILY PRN
COMMUNITY
Start: 2025-03-30

## 2025-05-14 RX ORDER — ATORVASTATIN CALCIUM 40 MG/1
40 TABLET, FILM COATED ORAL
COMMUNITY
Start: 2025-03-25

## 2025-05-14 RX ORDER — GLYCOPYRROLATE 2 MG/1
1 TABLET ORAL DAILY
COMMUNITY
Start: 2025-03-26

## 2025-05-14 NOTE — PROGRESS NOTES
Subjective     Chief Complaint: back pain and bilateral hip pain    Patient ID: April Judy Nicolas is a 50 y.o. female seen for consultation today at the request of  ORION Dennison*    Back Pain  Associated symptoms: abdominal pain    Associated symptoms: no chest pain, no dysuria, no fever, no headaches, no numbness, no pelvic pain and no weakness    Hip Pain   Pertinent negatives include no numbness.       History of Present Illness:  Ms Nicolas is a 51yo F with known rheumatoid arthritis (not on DMARD therapy), fibromyalgia, SUGAR, IBS (on Stelara infusions), history of melanoma of the thigh s/p resection 1999, and obesity. She has had low back pain radiating into her hips bilaterally, left greater than right. The pain does not go down her legs. She denies weakness, numbness or tingling.  It is worse with standing, walking, lying flat. It is better when reclining or resting. She has not had PT or injections. She has recently started walking regularly, but she has a history of ankle surgery and has back and foot pain that prevents regular activity.   She denies bowel or bladder dysfunction or saddle anesthesia.. She has not had weakness or falls.     The following portions of the patient's history were reviewed and updated as appropriate: allergies, current medications, past family history, past medical history, past social history, past surgical history and problem list.    Past Medical History:   Diagnosis Date    Anxiety and depression     Arthritis     Arthritis of neck     Body piercing     EARS BILATERAL    Broken bones     ankle, wrist collar bone    Bursitis     Bursitis of hip     Cervical disc disorder 2024    Chest pain unsure of year    WITH EXCEDRINE TOO MUCH CAFFEINE.  LAST EPISODE WAS 2/2020.  PT WENT TO E.R.  STATED F/U WITH DR LEAL AND WAS TOLD THAT IT WAS DUE TO ACID REFLUX.     Claustrophobia     CTS (carpal tunnel syndrome)     Diarrhea     Difficulty walking 2017    Elevated  cholesterol     Fibromyalgia     Fibromyalgia, primary 2002    Fracture of wrist     GERD (gastroesophageal reflux disease)     Headache, tension-type 2000    Hernia, umbilical     High cholesterol     Hip arthrosis     Hip pain, acute, right     History of nuclear stress test 2014    Hypertension     IBS (irritable bowel syndrome)     Knee pain, bilateral     Knee swelling     Low back pain     Lumbosacral disc disease     Malignant melanoma     stage 1, left leg    Migraines     OCD (obsessive compulsive disorder)     PTSD (post-traumatic stress disorder)     Rotator cuff syndrome     Sleep apnea     CPAP USAGE    Tennis elbow     Thoracic disc disorder     Ulcerative colitis     Wears glasses         Family history:   Family History   Problem Relation Age of Onset    Rheum arthritis Mother     Ulcerative colitis Mother     Other (ulcerative colitis) Mother     Arthritis Mother     Dementia Mother     Migraines Mother     COPD Father     Migraines Father     Crohn's disease Sister     No Known Problems Maternal Aunt     No Known Problems Maternal Uncle     No Known Problems Paternal Aunt     No Known Problems Paternal Uncle     Kidney disease Maternal Grandmother     Migraines Maternal Grandmother     Alzheimer's disease Maternal Grandfather     Dementia Maternal Grandfather     No Known Problems Paternal Grandmother     No Known Problems Paternal Grandfather     Migraines Sister     Anxiety disorder Daughter     Breast cancer Neg Hx     Ovarian cancer Neg Hx        Social history:   Social History     Socioeconomic History    Marital status: Single   Tobacco Use    Smoking status: Former     Current packs/day: 0.00     Average packs/day: 1 pack/day for 9.0 years (9.0 ttl pk-yrs)     Types: Cigarettes     Start date: 2011     Quit date: 2020     Years since quittin.7     Passive exposure: Past    Smokeless tobacco: Never    Tobacco comments:     vaping, quit smoking 2020    Vaping Use     Vaping status: Every Day    Substances: Nicotine, Flavoring   Substance and Sexual Activity    Alcohol use: No    Drug use: No    Sexual activity: Yes     Partners: Male     Birth control/protection: Hysterectomy       Review of Systems   Constitutional:  Negative for appetite change, chills, diaphoresis, fever and unexpected weight change.   HENT:  Negative for congestion, dental problem, drooling, ear discharge, ear pain, facial swelling, hearing loss, mouth sores, nosebleeds, postnasal drip, rhinorrhea, sinus pressure, sinus pain, sneezing, sore throat, tinnitus, trouble swallowing and voice change.    Eyes:  Positive for visual disturbance. Negative for photophobia, pain, discharge, redness and itching.   Respiratory:  Positive for apnea. Negative for cough, choking, chest tightness, shortness of breath, wheezing and stridor.    Cardiovascular:  Positive for palpitations and leg swelling. Negative for chest pain.   Gastrointestinal:  Positive for abdominal pain. Negative for abdominal distention, anal bleeding, blood in stool, constipation, diarrhea, nausea, rectal pain and vomiting.   Endocrine: Negative for cold intolerance, heat intolerance, polydipsia, polyphagia and polyuria.   Genitourinary:  Negative for decreased urine volume, difficulty urinating, dyspareunia, dysuria, enuresis, flank pain, frequency, genital sores, hematuria, menstrual problem, pelvic pain, urgency, vaginal bleeding, vaginal discharge and vaginal pain.   Musculoskeletal:  Positive for arthralgias, back pain, joint swelling, neck pain and neck stiffness. Negative for gait problem and myalgias.   Skin:  Negative for color change, pallor, rash and wound.   Allergic/Immunologic: Negative for environmental allergies, food allergies and immunocompromised state.   Neurological:  Negative for dizziness, tremors, seizures, syncope, facial asymmetry, speech difficulty, weakness, light-headedness, numbness and headaches.   Hematological:   "Negative for adenopathy. Does not bruise/bleed easily.   Psychiatric/Behavioral:  Positive for decreased concentration. Negative for agitation, behavioral problems, confusion, dysphoric mood, hallucinations, self-injury, sleep disturbance and suicidal ideas. The patient is nervous/anxious. The patient is not hyperactive.        Objective   Blood pressure (!) 164/128, temperature 98 °F (36.7 °C), temperature source Infrared, height 165.1 cm (65\"), weight 110 kg (242 lb).  Body mass index is 40.27 kg/m².    Physical Exam  CONSTITUTIONAL:   - Patient is well-nourished  - Pleasant and appears stated age.  PSYCHIATRIC:  - Normal mood and affect  - Behavior is normal.  HENT:   Head: Normocephalic and Atraumatic.   Eyes:     - Pupils are equal, round, and reactive to light.     - EOM are normal.   CV:   - Regular rate and rhythm on palpable radial pulse   PULMONARY:   - Speaking in full sentences, breathing non labored  - No wheezing   SKIN:  - Clean, dry and intact   MUSCULOSKELETAL:  - mid/low back is tender to palpation    -chronic right ankle swelling   - Strength is intact in the upper and lower extremities to direct testing; hip flexion is weak bilaterally.  - Muscle tone is normal throughout.  - Station and gait are independent but slower and antalgic  - ROM in neck/back is reduced   - Straight leg raise is postitive on the right at 45 degrees, positive at 20 degrees on the left.   NEUROLOGICAL:  - A&Ox3  - Attention span, language function, and cognition are intact.  - Sensation is intact to light touch testing throughout.  - Deep tendon reflexes are 1+ and symmetrical.    - Prather's Sign is negative bilaterally.  - No clonus is elicited.  - Coordination is intact.     Patient's Body mass index is 40.27 kg/m². indicating that she is morbidly obese (BMI > 40 or > 35 with obesity - related health    Assessment & Plan     Independent Review of Radiographic Studies:    MRI of the lumbar spine dated 3/28/25 was " reviewed. There is a transitional segment, with this numbering, there is an L1-2 disc herniation slightly rightward without significant central or foraminal stenosis.  There is degenerative disc disease with joint effusions at L2-3, and L3-4.  There is hypertrophy of the joints at L4-5 and L5-S1.  There is a small disc herniation leftward at L5-S1 as well with mild left foraminal narrowing at this level.  And incidental cyst is noted at S2 on the left.  The MRI was reviewed with the patient in the room and its findings were discussed.  I reviewed the scan with Dr. Sapp as well  The radiology report mentions the possibility of septic arthritis, but her referring provider had checked sed rate which was mildly elevated and white cell count which was normal  MRI of the left hip dated 3/7/2025 did not show any significant abnormalities    Medical Decision Making:    Ms Nicolas has significant lumbar degenerative changes resulting in primarily mechanical back pain.  She has some hip pain primarily on the left but no real radicular pain.  No surgical intervention is warranted.  Discussed the need with her for ongoing physical activity to help with her arthritic pain.  I have put in a referral to pain physical therapy.  She would benefit from pool therapy as well although that is about half an hour away from where she lives which may be too burdensome.  I discussed signs and symptoms of radicular pain and she will contact us if her pain progresses or if this develops.  She should continue her Celebrex as long as she is able to tolerate it.  We will plan to see her back here on an as-needed basis going forward should she have change in her symptoms.    Diagnoses and all orders for this visit:    1. Left hip pain (Primary)    2. Lumbar facet arthropathy  -     Ambulatory Referral to Physical Therapy for Evaluation & Treatment    3. Rheumatoid arthritis with negative rheumatoid factor, involving unspecified site  -      Ambulatory Referral to Physical Therapy for Evaluation & Treatment    4. Chronic midline low back pain without sciatica  -     Ambulatory Referral to Physical Therapy for Evaluation & Treatment    Other orders  -     MRI outside films; Future      Return if symptoms worsen or fail to improve.       This document signed by Radha Lea PA-C  May 14, 2025 15:16 EDT

## 2025-05-21 ENCOUNTER — HOSPITAL ENCOUNTER (OUTPATIENT)
Dept: PHYSICAL THERAPY | Facility: HOSPITAL | Age: 50
Setting detail: THERAPIES SERIES
Discharge: HOME | End: 2025-05-21
Payer: MEDICAID

## 2025-05-21 PROCEDURE — 97530 THERAPEUTIC ACTIVITIES: CPT

## 2025-05-21 PROCEDURE — 97161 PT EVAL LOW COMPLEX 20 MIN: CPT

## 2025-05-21 ASSESSMENT — PAIN DESCRIPTION - LOCATION: LOCATION: BACK;HIP;LEG;KNEE

## 2025-05-21 ASSESSMENT — PAIN DESCRIPTION - ORIENTATION: ORIENTATION: RIGHT;LEFT;LOWER

## 2025-05-21 ASSESSMENT — PAIN DESCRIPTION - PAIN TYPE: TYPE: CHRONIC PAIN

## 2025-05-21 ASSESSMENT — PAIN SCALES - GENERAL: PAINLEVEL_OUTOF10: 5

## 2025-05-21 NOTE — THERAPY EVALUATION
Patient reports no change in pain when sitting in her recliner with her legs up.  Additional Pertinent Hx (if applicable):     Prior diagnostic testing:: MRI      Learning/Language: Learning  Does the patient/guardian have any barriers to learning?: No barriers  Will there be a co-learner?: No  What is the preferred language of the patient/guardian?: English  Is an  required?: No     Pain Screening   Pain Screening  Patient Currently in Pain: Yes  Pain Assessment: 0-10  Pain Level: 5  Best Pain Level: 5  Worst Pain Level: 8  Pain Type: Chronic pain  Pain Location: Back, Hip, Leg, Knee  Pain Orientation: Right, Left, Lower  Pain Descriptors: Aching, Sharp, Shooting, Spasm, Numbness, Tingling    Functional Status       Social History:  Social History  Lives With: Family    Occupation/Interests:  Occupation: Unemployed    Prior Level of Function:  Patient reports independence with ADLs.          Current Level of Function:  Patient reports she is able to get dressed but it does take more time as she has to go slow. Patient reports she is able to bathe and toilet independently but standing does increase pain in her back. Patient reports her daughter does most of the cooking and cleaning since standing for longer periods of time increases her back pain. Patient reports she is only able to stand for 10' and walk for 15-20' before pain increases. Patient states she only drives short distances when she has too. Patient reports she does not currently work. General   Sleeping Tolerance: Patient reports she sleeps on her side and will turn positions once the pain gets worse in her hips.  Driving: Patient reports she only drives short distances    Receives Help From: Family  Prior Level of Assist for ADLs: Needs assistance  Prior Level of Assist for Homemaking: Needs assistance  Homemaking Responsibilities: Yes  Ambulation Assistance: Independent  Active : Yes  Mode of Transportation: Car    OBJECTIVE

## 2025-05-27 ENCOUNTER — HOSPITAL ENCOUNTER (OUTPATIENT)
Dept: PHYSICAL THERAPY | Facility: HOSPITAL | Age: 50
Setting detail: THERAPIES SERIES
Discharge: HOME | End: 2025-05-27
Payer: MEDICAID

## 2025-05-27 PROCEDURE — 97110 THERAPEUTIC EXERCISES: CPT

## 2025-05-27 NOTE — PROGRESS NOTES
Physical Therapy Daily Treatment Note   Date:  2025    TIme In:   1353                   Time Out:  1439    Patient Name:  Michaela Leon    :  1975  MRN: [id number]    Restrictions/Precautions:    Pertinent Medical History:  Medical/Treatment Diagnosis Information:  Spondylosis without myelopathy or radiculopathy, lumbar region [M47.816]  Rheumatoid arthritis without rheumatoid factor, unspecified site (HCC) [M06.00]  Low back pain, unspecified [M54.50]     Insurance/Certification information:  Payor: [facility] PL / Plan: [facility] PLAN OF [place] / Product Type: *No Product type* /   Physician Information:  Kasia Marsh PA  Plan of care signed (Y/N):    Visit# / total visits:     2 /    G-Code (if applicable):      Date / Visit # G-Code Applied:         Progress Note: []  Yes  [x]  No  Next due by: Visit #10       Pain level:   3/10  Subjective: Patient reports compliance with HEP. Patient states her muscles spasms have been getting better since the initial evaluation.      Objective:  Observation:   Test measurements:    Palpation:    Exercises:  Exercise Resistance/Repetitions Date performed   NuStep Lvl 3, 5' 27   Standing hip ABD  20x  27   Standing hip EXT  20x  27   Standing heel raises  20x  27   Seated marching  20x GTB  27   Seated thoracic extension 10x  27   Seated figure 4/piriformis stretch  5x10s B  27   Seated HS stretch  3x20s B  27   Sypine LTR  20x  27   Supine posterior pelvic tilt  10x  27   Supine bent knee fall out  20x  27   Sidelying open book  10x B  27          Other Therapeutic Activities:      Manual Treatments:      Modalities:  Cold pack to low back - 10'       Timed Code Treatment Minutes:  46      Total Treatment Minutes:  46    Treatment/Activity Tolerance:  [x] Patient tolerated treatment well [] Patient limited by fatigue  [] Patient limited by pain  [] Patient limited by other medical complications  [x] Other: Patient did

## 2025-05-29 ENCOUNTER — HOSPITAL ENCOUNTER (OUTPATIENT)
Dept: PHYSICAL THERAPY | Facility: HOSPITAL | Age: 50
Setting detail: THERAPIES SERIES
Discharge: HOME | End: 2025-05-29
Payer: MEDICAID

## 2025-05-29 PROCEDURE — 97110 THERAPEUTIC EXERCISES: CPT | Performed by: PHYSICAL THERAPIST

## 2025-05-29 NOTE — PROGRESS NOTES
Physical Therapy Daily Treatment Note   Date:  2025    TIme In:   1350                   Time Out:  1439    Patient Name:  Michaela Leon    :  1975  MRN: [id number]    Restrictions/Precautions:    Pertinent Medical History:  Medical/Treatment Diagnosis Information:  Spondylosis without myelopathy or radiculopathy, lumbar region [M47.816]  Rheumatoid arthritis without rheumatoid factor, unspecified site (HCC) [M06.00]  Low back pain, unspecified [M54.50]     Insurance/Certification information:  Payor: [facility] PL / Plan: [facility] PLAN OF [place] / Product Type: *No Product type* /   Physician Information:  Kasia Marsh PA  Plan of care signed (Y/N):    Visit# / total visits:     3 /    G-Code (if applicable):      Date / Visit # G-Code Applied:         Progress Note: []  Yes  [x]  No  Next due by: Visit #10       Pain level:   3/10  Subjective: Patient states that she is doing her HEP daily and feels like it is helping but she has increased pain after doing them.      Objective:  Observation:   Test measurements:    Palpation:    Exercises:  Exercise Resistance/Repetitions Date performed   NuStep Lvl 3, 5' 29   Standing hip ABD  20x  29   Standing hip EXT  20x  29   Standing heel raises  20x  29   Seated marching  20x GTB  29   Seated thoracic extension 10x  29   Seated figure 4/piriformis stretch  5x10s B  29   Seated HS stretch  3x20s B  29   Sypine LTR  20x  29   Supine posterior pelvic tilt  10x  29   Supine bent knee fall out  20x  29   Sidelying open book  10x B  29          Other Therapeutic Activities:      Manual Treatments:      Modalities:  Cold pack to low back - 10'       Timed Code Treatment Minutes:  39      Total Treatment Minutes:  49    Treatment/Activity Tolerance:  [x] Patient tolerated treatment well [] Patient limited by fatigue  [] Patient limited by pain  [] Patient limited by other medical complications  [x] Other: Patient did well

## 2025-06-03 ENCOUNTER — HOSPITAL ENCOUNTER (OUTPATIENT)
Dept: PHYSICAL THERAPY | Facility: HOSPITAL | Age: 50
Setting detail: THERAPIES SERIES
Discharge: HOME | End: 2025-06-03
Payer: MEDICAID

## 2025-06-03 PROCEDURE — 97110 THERAPEUTIC EXERCISES: CPT

## 2025-06-05 ENCOUNTER — HOSPITAL ENCOUNTER (OUTPATIENT)
Dept: PHYSICAL THERAPY | Facility: HOSPITAL | Age: 50
Setting detail: THERAPIES SERIES
Discharge: HOME | End: 2025-06-05
Payer: MEDICAID

## 2025-06-05 PROCEDURE — 97110 THERAPEUTIC EXERCISES: CPT | Performed by: PHYSICAL THERAPIST

## 2025-06-05 NOTE — PROGRESS NOTES
Physical Therapy Daily Treatment Note   Date:  2025    TIme In:   1430                   Time Out:  1513    Patient Name:  Michaela Leon    :  1975  MRN: [id number]    Restrictions/Precautions:    Pertinent Medical History:  Medical/Treatment Diagnosis Information:  Spondylosis without myelopathy or radiculopathy, lumbar region [M47.816]  Rheumatoid arthritis without rheumatoid factor, unspecified site (HCC) [M06.00]  Low back pain, unspecified [M54.50]     Insurance/Certification information:  Payor: [facility] PL / Plan: [facility] PLAN OF [place] / Product Type: *No Product type* /   Physician Information:  Kasia Marsh PA  Plan of care signed (Y/N):    Visit# / total visits:     5 /    G-Code (if applicable):      Date / Visit # G-Code Applied:         Progress Note: []  Yes  [x]  No  Next due by: Visit #10       Pain level:   310  Subjective: Patient states that she is doing her HEP daily and feels like it is helping but is still having increased pain after doing them.      Objective:  Observation:   Test measurements:    Palpation:    Exercises:  Exercise Resistance/Repetitions Date performed   NuStep Lvl 3, 5' 5   Standing hip ABD  20x  5   Standing hip EXT  20x  5   Standing heel raises  20x  5   Seated marching  20x GTB  5   Seated thoracic extension 10x  5   Seated figure 4/piriformis stretch  5x10s B  5   Seated HS stretch  3x20s B  5   Sypine LTR  20x  -   Supine posterior pelvic tilt  10x  5   Supine bent knee fall out  20x  5   Sidelying open book  10x B  5          Other Therapeutic Activities:      Manual Treatments:      Modalities:  Cold pack to low back - 10'       Timed Code Treatment Minutes:  33      Total Treatment Minutes:  43    Treatment/Activity Tolerance:  [x] Patient tolerated treatment well [] Patient limited by fatigue  [] Patient limited by pain  [] Patient limited by other medical complications  [x] Other: Patient did well with

## 2025-06-10 ENCOUNTER — HOSPITAL ENCOUNTER (OUTPATIENT)
Dept: PHYSICAL THERAPY | Facility: HOSPITAL | Age: 50
Setting detail: THERAPIES SERIES
Discharge: HOME | End: 2025-06-10
Payer: MEDICAID

## 2025-06-10 PROCEDURE — 97110 THERAPEUTIC EXERCISES: CPT | Performed by: PHYSICAL THERAPIST

## 2025-06-10 NOTE — PROGRESS NOTES
did well with her exercises today. She was able to complete all treatment correctly with fewer rest breaks.  She continues to have increased pain post treatment.    Pain after treatment:      3/10    Prognosis: [x] Good [] Fair  [] Poor    Patient Requires Follow-up: [x] Yes  [] No    Plan:   [x] Continue per plan of care [] Alter current plan (see comments)  [] Plan of care initiated [] Hold pending MD visit [] Discharge    Plan for Next Session:        Electronically signed by:  Marilou Patino PT

## 2025-06-12 ENCOUNTER — HOSPITAL ENCOUNTER (OUTPATIENT)
Dept: PHYSICAL THERAPY | Facility: HOSPITAL | Age: 50
Setting detail: THERAPIES SERIES
Discharge: HOME | End: 2025-06-12
Payer: MEDICAID

## 2025-06-12 PROCEDURE — 97110 THERAPEUTIC EXERCISES: CPT

## 2025-06-12 NOTE — PROGRESS NOTES
limited by pain  [] Patient limited by other medical complications  [] Other:     Pain after treatment:      4/10    Prognosis: [x] Good [] Fair  [] Poor    Patient Requires Follow-up: [x] Yes  [] No    Plan:   [x] Continue per plan of care [] Alter current plan (see comments)  [] Plan of care initiated [] Hold pending MD visit [] Discharge    Plan for Next Session:        Electronically signed by:  Viktoria Almanzar PTA

## 2025-06-17 ENCOUNTER — HOSPITAL ENCOUNTER (OUTPATIENT)
Dept: PHYSICAL THERAPY | Facility: HOSPITAL | Age: 50
Setting detail: THERAPIES SERIES
Discharge: HOME | End: 2025-06-17
Payer: MEDICAID

## 2025-06-17 PROCEDURE — 97110 THERAPEUTIC EXERCISES: CPT | Performed by: PHYSICAL THERAPIST

## 2025-06-17 NOTE — PROGRESS NOTES
Physical Therapy Daily Treatment Note   Date:  2025    TIme In:  1528                  Time Out:  1607    Patient Name:  Michaela Leon   :  1975 MRN: [id number]    Restrictions/Precautions:    Pertinent Medical History:  Medical/Treatment Diagnosis Information:  Spondylosis without myelopathy or radiculopathy, lumbar region [M47.816]  Rheumatoid arthritis without rheumatoid factor, unspecified site (HCC) [M06.00]  Low back pain, unspecified [M54.50]     Insurance/Certification information:  Payor: [facility] PL / Plan: [facility] PLAN OF [place] / Product Type: *No Product type* /   Physician Information:  Kasia Marsh PA  Plan of care signed (Y/N):    Visit# / total visits:     8 /    G-Code (if applicable):      Date / Visit # G-Code Applied:         Progress Note: []  Yes  [x]  No  Next due by: Visit #10       Pain level:   3/10    Subjective:  Patient states that she is okay but her back just hurts all the time. States nothing really helps her pain now.     Objective:  Observation:   Test measurements:    Palpation:    Exercises:  Exercise Resistance/Repetitions Date performed   NuStep Level 3, 5 minutes 17   Standing hip ABD  20x  17   Standing hip EXT  20x  17   Standing heel raises  20x  17   Seated marching  BTB 20x  17   Seated hip ABD BTB 20x 17   Seated hip ADD  w/ball 20x 17   Seated thoracic extension 10x  17   Seated figure 4/piriformis stretch  5x10s B  17   Seated HS stretch  3x20s B  17   Supine posterior pelvic tilt  20x  17   Supine bent knee fall out  20x  17   Bridges 20x 17   Cat/Cow 20x 17     Other Therapeutic Activities:      Manual Treatments:      Modalities:  Cold pack to low back - 10'       Timed Code Treatment Minutes:  29      Total Treatment Minutes:  39    Treatment/Activity Tolerance:  [x] Patient tolerated treatment well [] Patient limited by fatigue  [] Patient limited by pain  [] Patient limited by other medical

## 2025-06-19 ENCOUNTER — HOSPITAL ENCOUNTER (OUTPATIENT)
Dept: PHYSICAL THERAPY | Facility: HOSPITAL | Age: 50
Setting detail: THERAPIES SERIES
Discharge: HOME | End: 2025-06-19
Payer: MEDICAID

## 2025-06-19 PROCEDURE — 97110 THERAPEUTIC EXERCISES: CPT

## 2025-06-19 NOTE — PROGRESS NOTES
Physical Therapy Daily Treatment Note / Re-Assessment     Date:  2025    TIme In:  1535                  Time Out:  1623    Patient Name:  Michaela Leon   :  1975 MRN: [id number]    Restrictions/Precautions:    Pertinent Medical History:  Medical/Treatment Diagnosis Information:  Spondylosis without myelopathy or radiculopathy, lumbar region [M47.816]  Rheumatoid arthritis without rheumatoid factor, unspecified site (HCC) [M06.00]  Low back pain, unspecified [M54.50]     Insurance/Certification information:  Payor: [facility] PL / Plan: [employer] [facility] PLAN OF [place] / Product Type: *No Product type* /   Physician Information:  Kasia Marsh PA  Plan of care signed (Y/N):    Visit# / total visits:     9 /    G-Code (if applicable):      Date / Visit # G-Code Applied:         Progress Note: [x]  Yes  []  No  Next due by: Visit #10       Pain level:   310    Subjective:  Patient reports she is still having a decent amount of back pain day to day. Patient reports minimal improvement since starting physical therapy. Patient reports compliance with HEP. Patient reports she wants to continue with PT to decrease her symptoms.      Objective:  Observation:   Test measurements:      Left Strength  Right Strength      Strength LLE  L Hip Flexion: 4/5  L Hip ABduction: 4/5  L Hip ADduction: 4/5  L Knee Flexion: 4/5  L Knee Extension: 4/5  L Ankle Dorsiflexion: 4/5 Strength RLE  R Hip Flexion: 4/5  R Hip ABduction: 4/5  R Hip ADduction: 4/5  R Knee Flexion: 4/5  R Knee Extension: 4/5  R Ankle Dorsiflexion: 4/5      Lumbar Assessment   AROM Lumbar Spine   Lumbar Spine AROM : Painful  Measured as: % of normal  Flexion: 25  Extension: 25  Lateral Flexion Right: 25  Lateral Flexion Left: 25  Right Rotation: 50  Left Rotation: 50     5xSTS: 19 seconds (previous: 25 seconds)   TU seconds (previous: 19 seconds)   Palpation:   Back Index: 54 (previous score: 58)

## 2025-06-26 ENCOUNTER — HOSPITAL ENCOUNTER (OUTPATIENT)
Dept: PHYSICAL THERAPY | Facility: HOSPITAL | Age: 50
Setting detail: THERAPIES SERIES
Discharge: HOME | End: 2025-06-26
Payer: MEDICAID

## 2025-06-26 PROCEDURE — 97110 THERAPEUTIC EXERCISES: CPT

## 2025-06-26 NOTE — ASSESSMENT & PLAN NOTE
no bleeding or Burandt pain.  Doing well well well 2/12/2025: Shoulder x-ray showed mild degenerative changes at the AC joints.  Knee x-ray showed mild bilateral tricompartmental osteoarthritis and hand x-ray showed mild osteophyte formation suggestive of mild osteophyte arthritis.  No erosive changes noted.     Continue as needed NSAID

## 2025-06-26 NOTE — PROGRESS NOTES
Physical Therapy Daily Treatment Note    Date:  2025    TIme In:  1424                 Time Out:  1507    Patient Name:  Michaela Leon     :  1975 MRN: [id number]    Restrictions/Precautions:    Pertinent Medical History:  Medical/Treatment Diagnosis Information:  Spondylosis without myelopathy or radiculopathy, lumbar region [M47.816]  Rheumatoid arthritis without rheumatoid factor, unspecified site (HCC) [M06.00]  Low back pain, unspecified [M54.50]     Insurance/Certification information:  Payor: [facility] / Plan: [facility] PLAN OF [place] / Product Type: *No Product type* /   Physician Information:  Kasia Marsh PA  Plan of care signed (Y/N):    Visit# / total visits:     10 /    G-Code (if applicable):     Date / Visit # G-Code Applied:         Progress Note: []  Yes  [x]  No  Next due by: Visit #10       Pain level:    3/10    Subjective:  Patient reports her pain is the same. Reports nothing really helps to decrease it.     Objective:  Observation:   Test measurements:    Palpation:     Exercises:  Exercise Resistance/Repetitions Date performed   NuStep Level 3, 5 minutes 26   Standing hip ABD  20x  26   Standing hip EXT  20x  26   Standing heel raises  20x  26   Seated marching  BTB 20x  26   Seated hip ABD BTB 20x 26   Seated hip ADD  w/ball 20x 26   Seated thoracic extension 10x  26   Seated figure 4/piriformis stretch  5x10s B  26   Seated HS stretch  3x20s B  26   Mid rows  Red 20x  26   Shoulder extension  Red 20x  26   Pallof press  Red 20x  26   Supine posterior pelvic tilt  20x  26   Supine bent knee fall out  20x  26   Bridges 20x 26     Other Therapeutic Activities:      Manual Treatments:      Modalities:  Cold pack to low back - 10'       Timed Code Treatment Minutes:  33      Total Treatment Minutes:  43    Treatment/Activity Tolerance:  [x] Patient tolerated treatment well [] Patient limited by fatigue  [] Patient limited by pain  []

## 2025-06-26 NOTE — ASSESSMENT & PLAN NOTE
No relief with flexeril  Trial baclofen. She did not receive after last visit.  Continue Duloxetine

## 2025-06-26 NOTE — ASSESSMENT & PLAN NOTE
Significant tenderness and swelling of the MCPs, ankles, and MTPs were observed during the examination. Negative results were obtained for 14.3.3 eta, rheumatoid factor, CCP, and ELÍAS in November 2023.   September 2024: SSA and SSB negative.  Inflammatory markers within normal range.  20 factor x 3 negative.  September 20 24 foot x-ray:1.Bilateral plantar calcaneal spurs   2.Evidence for some degenerative change involving the first metatarsophalangeal joint spaces a little greater on the left.   3.Asymmetric soft tissue fat about the forefoot on the right as compared to the left of questionable significance   September 2024 cervical spine x-ray:  Vertebral body heights are maintained without evidence of acute fracture and alignment is anatomic without evidence of listhesis or subluxation. Multilevel spondylosis change is present, with areas of disc osteophyte complex formation and facet   arthropathy, most notable at C5-6 and C6-7. The prevertebral soft tissues are normal. The dens is intact.   September 2023 left hip x-ray:Lucency through the greater trochanter is seen on the frog lateral view only, possibly corticated. There is no definite acute osseous injury but could be further evaluated on MRI if there is clinical concern   Historically she has tried/failed plaquenil (did not work), sulfasalazine (unsure why she stopped) and methotrexate (did not help)    Currently she is taking Stelara with her GI, Dr. Jamison.  She has a history of ulcerative colitis.  Joint pain is much worse in the majority of her joints.    Carpal tunnels symptoms have worsened as well.  She is dropping things.  She has remote history (1999) of melanoma.  No CHF, demyelinating brain disease, blood clots, heart attack, liver disease (aside from fatty liver) and diverticuli.  She does have elevated cholesterol.  She has tried and failed Plaquenil, methotrexate and sulfasalazine.    Trial Arava 10mg po daily.  Handout on arava given to  patient to take home and review.    We will get approval fro GI to start Arava  Prednisone taper today for synovitis    Orders:    Comprehensive Metabolic Panel    C-reactive Protein    Sedimentation Rate    CBC & Differential

## 2025-06-26 NOTE — ASSESSMENT & PLAN NOTE
She is currently on Stelara and Lialda for the treatment of her inflammatory bowel disease. Trial arava 10mg po daily     Labs every 2 months  She does have fatty liver and mild elevation of LFT  Will monitor closely

## 2025-06-26 NOTE — ASSESSMENT & PLAN NOTE
3/28/25: 1.  Transitional lumbosacral anatomy.   2.  Multilevel facet arthropathy with associated enhancement, most pronounced at L4-5 and L5-S1 bilaterally likely representing cellulitis. Septic arthritis is difficult to exclude. No definite enhancing epidural soft tissue.   3.  Spondylosis with no high-grade stenosis.     We referred to Dr. Sapp's office, [facility] surgery.  Physical therapy was ordered.  No surgery was recommended.  She continues in PT.  PT is not helping.  She is only going once a week.  She is doing the exercises at home.  Please note an addendum was added by radiology to state impression 2 should say enhancing synovitis not cellulitis

## 2025-06-26 NOTE — PROGRESS NOTES
Office Visit       Date: 06/30/2025   Patient Name: Vanna Nicolas  MRN: [id number]  YOB: 1975    Referring Physician: No ref. provider found     Chief Complaint:   Chief Complaint   Patient presents with    Rheumatoid arthritis with negative rheumatoid factor, invol       History of Present Illness: Vanna Nicolas is a 50 y.o. female who is here today for her first time follow-up appointment.  She is a former Dr. Rodrigez patient.  She was historically diagnosed with rheumatoid arthritis by another rheumatologist.  She is off of DMARD therapy.  She takes Stelara with GI.  She has also been diagnosed with fibromyalgia and we prescribe her flexeril.    Interim 6/30/2025: Today patient reports feeling the same.  She rates her pain 5.5/10, global 5.5/10 and 1 hour morning stiffness.  She declines refills today.      Subjective   Review of Systems:  Review of Systems   Constitutional:  Positive for diaphoresis and fatigue.   Cardiovascular:  Positive for leg swelling.   Gastrointestinal:  Positive for diarrhea.   Endocrine: Positive for heat intolerance.   Musculoskeletal:  Positive for arthralgias, back pain, gait problem, joint swelling, myalgias, neck pain and neck stiffness.   Skin:  Positive for rash.        Hair loss   Neurological:  Positive for numbness.   Psychiatric/Behavioral:  Positive for decreased concentration. The patient is nervous/anxious.    All other systems reviewed and are negative.       Past Medical History:   Past Medical History:   Diagnosis Date    Anxiety and depression     Arthritis     Arthritis of neck     Body piercing     EARS BILATERAL    Broken bones     ankle, wrist collar bone    Bursitis     Bursitis of hip     Cervical disc disorder 2024    Chest pain unsure of year    WITH EXCEDRINE TOO MUCH CAFFEINE.  LAST EPISODE WAS 2/2020.  PT WENT TO E.R.  STATED F/U WITH DR LEAL AND WAS TOLD THAT IT WAS DUE TO ACID REFLUX.     Claustrophobia     CTS  (carpal tunnel syndrome)     Diarrhea     Difficulty walking 2017    Elevated cholesterol     Fibromyalgia     Fibromyalgia, primary 2002    Fracture of wrist     GERD (gastroesophageal reflux disease)     Headache, tension-type 2000    Hernia, umbilical     High cholesterol     Hip arthrosis     Hip pain, acute, right     History of nuclear stress test 2014    Hypertension     IBS (irritable bowel syndrome)     Knee pain, bilateral     Knee swelling     Low back pain     Lumbosacral disc disease     Malignant melanoma     stage 1, left leg    Migraines     OCD (obsessive compulsive disorder)     PTSD (post-traumatic stress disorder)     Rotator cuff syndrome     Sleep apnea     CPAP USAGE    Tennis elbow     Thoracic disc disorder     Ulcerative colitis     Wears glasses        Past Surgical History:   Past Surgical History:   Procedure Laterality Date    ANKLE OPEN REDUCTION INTERNAL FIXATION Right 09/25/2020    Procedure: Right distal fibula open reduction internal fixation;  Surgeon: Danny Fuller MD;  Location: [facility];  Service: Orthopedics;  Laterality: Right;    CARPAL TUNNEL RELEASE Right 01/10/2018    Procedure: ELECTIVE RIGHT CARPAL TUNNEL RELEASE;  Surgeon: Gus Ramos MD;  Location: [facility];  Service:     CARPAL TUNNEL RELEASE Left 04/18/2018    Procedure: ELECTIVE LEFT CARPAL TUNNEL RELEASE;  Surgeon: Gus Ramos MD;  Location: [facility] OR;  Service: Orthopedics    COLONOSCOPY      ENDOSCOPY      EPIDURAL BLOCK  1998    HYSTERECTOMY      LAPAROSCOPIC TUBAL LIGATION      SKIN BIOPSY      STAGE 1 LEFT THIGH    SKIN CANCER EXCISION      TOTAL ABDOMINAL HYSTERECTOMY WITH SALPINGO OOPHORECTOMY  2004    ? Hormonal migraines    WISDOM TOOTH EXTRACTION      WRIST SURGERY  1/10/18,4/18/18       Family History:   Family History   Problem Relation Age of Onset    Rheum arthritis Mother     Ulcerative colitis Mother     Other (ulcerative colitis) Mother     Arthritis Mother     Dementia Mother      Migraines Mother     COPD Father     Migraines Father     Crohn's disease Sister     No Known Problems Maternal Aunt     No Known Problems Maternal Uncle     No Known Problems Paternal Aunt     No Known Problems Paternal Uncle     Kidney disease Maternal Grandmother     Migraines Maternal Grandmother     Alzheimer's disease Maternal Grandfather     Dementia Maternal Grandfather     No Known Problems Paternal Grandmother     No Known Problems Paternal Grandfather     Migraines Sister     Anxiety disorder Daughter     Breast cancer Neg Hx     Ovarian cancer Neg Hx        Social History:   Social History     Socioeconomic History    Marital status: Single   Tobacco Use    Smoking status: Former     Current packs/day: 0.00     Average packs/day: 1 pack/day for 9.0 years (9.0 ttl pk-yrs)     Types: Cigarettes     Start date: 2011     Quit date: 2020     Years since quittin.8     Passive exposure: Past    Smokeless tobacco: Never    Tobacco comments:     vaping, quit smoking 2020    Vaping Use    Vaping status: Every Day    Substances: Nicotine, Flavoring   Substance and Sexual Activity    Alcohol use: No    Drug use: No    Sexual activity: Yes     Partners: Male     Birth control/protection: Hysterectomy       Medications:   Current Outpatient Medications:     amLODIPine (NORVASC) 5 MG tablet, , Disp: , Rfl:     atorvastatin (LIPITOR) 40 MG tablet, Take 1 tablet by mouth every night at bedtime., Disp: , Rfl:     baclofen (LIORESAL) 10 MG tablet, Take 1 tablet by mouth 3 (Three) Times a Day As Needed for Muscle Spasms., Disp: 90 tablet, Rfl: 5    buPROPion XL (WELLBUTRIN XL) 150 MG 24 hr tablet, Take 1 tablet by mouth Daily., Disp: , Rfl:     busPIRone (BUSPAR) 30 MG tablet, Take 1 tablet by mouth Every 12 (Twelve) Hours., Disp: , Rfl:     celecoxib (CeleBREX) 200 MG capsule, Take 1 capsule by mouth Daily., Disp: , Rfl:     Creon 69879-399335 units capsule delayed-release particles capsule, TAKE 2  CAPSULES BY MOUTH 3 TIMES A DAY AND 1 WITH SNACKS, Disp: , Rfl:     divalproex (DEPAKOTE) 500 MG 24 hr tablet, Take 1 tablet by mouth Every Night., Disp: 90 tablet, Rfl: 1    DULoxetine (CYMBALTA) 60 MG capsule, Take 1 capsule by mouth Daily., Disp: , Rfl:     estradiol (ESTRACE) 2 MG tablet, Take 1 tablet by mouth Daily., Disp: , Rfl:     folic acid (FOLVITE) 1 MG tablet, TAKE 1 TABLET BY MOUTH WITH FOOD, Disp: , Rfl:     furosemide (LASIX) 40 MG tablet, Take 1 tablet by mouth Daily., Disp: , Rfl:     glycopyrrolate (ROBINUL) 2 MG tablet, Take 1 tablet by mouth Daily., Disp: , Rfl:     mesalamine (LIALDA) 1.2 g EC tablet, TAKE 2 TABLETS BY MOUTH ONE TIME A DAY, Disp: , Rfl: 1    metFORMIN (GLUCOPHAGE) 500 MG tablet, Take 1 tablet by mouth 2 (Two) Times a Day With Meals., Disp: , Rfl:     mirtazapine (REMERON) 45 MG tablet, Take 1 tablet by mouth every night at bedtime., Disp: , Rfl:     Ustekinumab (STELARA) 90 MG/ML solution prefilled syringe Injection, Inject  under the skin into the appropriate area as directed. Every 8 weeks, Disp: , Rfl:     leflunomide (Arava) 10 MG tablet, Take 1 tablet by mouth Daily., Disp: 90 tablet, Rfl: 1    predniSONE (DELTASONE) 5 MG tablet, Take 4 tablets by mouth Daily for 3 days, THEN 3 tablets Daily for 3 days, THEN 2 tablets Daily for 3 days, THEN 1 tablet Daily for 3 days., Disp: 30 tablet, Rfl: 0    Allergies:   Allergies   Allergen Reactions    Penicillins Unknown (See Comments)     'AS A BABY ALMOST PUT ME INTO A COMA'    Morphine And Codeine Nausea And Vomiting    Tetracyclines & Related Hives       I have reviewed and updated the patient's chief complaint, history of present illness, review of systems, past medical history, surgical history, family history, social history, medications and allergy list as appropriate.     Objective    Vital Signs:   Vitals:    06/30/25 1431   BP: 148/84   BP Location: Left arm   Patient Position: Sitting   Cuff Size: Adult   Pulse: 97  "  Temp: 97.4 °F (36.3 °C)   Weight: 110 kg (242 lb 9.6 oz)   Height: 165.1 cm (65\")   PainSc: 6    PainLoc: Generalized       Body mass index is 40.37 kg/m².           Physical Exam:  Physical Exam  Vitals reviewed.   Constitutional:       Appearance: Normal appearance.   HENT:      Head: Normocephalic and atraumatic.      Mouth/Throat:      Mouth: Mucous membranes are moist.   Eyes:      Conjunctiva/sclera: Conjunctivae normal.   Cardiovascular:      Rate and Rhythm: Regular rhythm. Tachycardia present.      Pulses: Normal pulses.      Heart sounds: Normal heart sounds.      Comments: Pedal edema bilaterally, non-pitting, worse on right.  Pulmonary:      Effort: Pulmonary effort is normal.      Breath sounds: Normal breath sounds.   Musculoskeletal:         General: Normal range of motion.      Cervical back: Normal range of motion and neck supple.      Comments: Synovitis right 2nd and 3rd MCP AND pip AND LEFT 2ND mcp  Diffuse Allodynia  Crepitus bilateral knees  Tender lumbar spine     Skin:     General: Skin is warm and dry.   Neurological:      General: No focal deficit present.      Mental Status: She is alert and oriented to person, place, and time. Mental status is at baseline.   Psychiatric:         Mood and Affect: Mood normal.         Behavior: Behavior normal.         Thought Content: Thought content normal.         Judgment: Judgment normal.          Results Review:   Imaging Results (Last 24 Hours)       ** No results found for the last 24 hours. **            Procedures    Assessment / Plan    Diagnoses and all orders for this visit:    1. Rheumatoid arthritis with negative rheumatoid factor, involving unspecified site (Primary)  Assessment & Plan:  Significant tenderness and swelling of the MCPs, ankles, and MTPs were observed during the examination. Negative results were obtained for 14.3.3 eta, rheumatoid factor, CCP, and ELÍAS in November 2023.   September 2024: SSA and SSB negative.  Inflammatory " markers within normal range.  20 factor x 3 negative.  September 20 24 foot x-ray:1.Bilateral plantar calcaneal spurs   2.Evidence for some degenerative change involving the first metatarsophalangeal joint spaces a little greater on the left.   3.Asymmetric soft tissue fat about the forefoot on the right as compared to the left of questionable significance   September 2024 cervical spine x-ray:  Vertebral body heights are maintained without evidence of acute fracture and alignment is anatomic without evidence of listhesis or subluxation. Multilevel spondylosis change is present, with areas of disc osteophyte complex formation and facet   arthropathy, most notable at C5-6 and C6-7. The prevertebral soft tissues are normal. The dens is intact.   September 2023 left hip x-ray:Lucency through the greater trochanter is seen on the frog lateral view only, possibly corticated. There is no definite acute osseous injury but could be further evaluated on MRI if there is clinical concern   Historically she has tried/failed plaquenil (did not work), sulfasalazine (unsure why she stopped) and methotrexate (did not help)    Currently she is taking Stelara with her GI, Dr. Jamison.  She has remote history (1999) of melanoma.  No CHF, demyelinating brain disease, blood clots, heart attack, liver disease (aside from fatty liver) and diverticuli.  She does have elevated cholesterol.            Orders:  -     Comprehensive Metabolic Panel  -     C-reactive Protein  -     Sedimentation Rate  -     CBC & Differential    2. Osteoarthritis of lumbar spine, unspecified spinal osteoarthritis complication status  Assessment & Plan:  3/28/25: 1.  Transitional lumbosacral anatomy.   2.  Multilevel facet arthropathy with associated enhancement, most pronounced at L4-5 and L5-S1 bilaterally likely representing cellulitis. Septic arthritis is difficult to exclude. No definite enhancing epidural soft tissue.   3.  Spondylosis with no high-grade  stenosis.     We referred to Dr. Sapp's office, [facility].  Physical therapy was ordered.  No surgery was recommended.  Please note an addendum was added by radiology to state impression 2 should say enhancing synovitis not cellulitis              3. High risk medication use  Assessment & Plan:  She is currently on Stelara and Lialda for the treatment of her inflammatory bowel disease. Collaboration with GI is necessary to formulate a treatment plan.          Orders:  -     Comprehensive Metabolic Panel  -     C-reactive Protein  -     Sedimentation Rate  -     CBC & Differential    4. Fibromyalgia  Assessment & Plan:  At last visit Flexeril was increased to 10mg TID.  No improvement.  Stop flexeril and trial baclofen 10mg tid prn muscle pain  Continue Duloxetine            5. History of melanoma  Assessment & Plan:  She is advised to use sunscreen aggressively, preferably with an SPF of 100             6. Osteoarthritis, unspecified osteoarthritis type, unspecified site  Assessment & Plan:  3/28/25: 1.  Transitional lumbosacral anatomy.   2.  Multilevel facet arthropathy with associated enhancement, most pronounced at L4-5 and L5-S1 bilaterally likely representing cellulitis. Septic arthritis is difficult to exclude. No definite enhancing epidural soft tissue.   3.  Spondylosis with no high-grade stenosis.     We referred to Dr. Sapp's office, [facility].  Physical therapy was ordered.  No surgery was recommended.  Please note an addendum was added by radiology to state impression 2 should say enhancing synovitis not cellulitis              7. Neck pain    8. Pain of left hip    9. Inflammatory polyarthritis    Other orders  -     leflunomide (Arava) 10 MG tablet; Take 1 tablet by mouth Daily.  Dispense: 90 tablet; Refill: 1  -     baclofen (LIORESAL) 10 MG tablet; Take 1 tablet by mouth 3 (Three) Times a Day As Needed for Muscle Spasms.  Dispense: 90 tablet; Refill:  5  -     predniSONE (DELTASONE) 5 MG tablet; Take 4 tablets by mouth Daily for 3 days, THEN 3 tablets Daily for 3 days, THEN 2 tablets Daily for 3 days, THEN 1 tablet Daily for 3 days.  Dispense: 30 tablet; Refill: 0      Assessment & Plan  Rheumatoid arthritis with negative rheumatoid factor, involving unspecified site  Significant tenderness and swelling of the MCPs, ankles, and MTPs were observed during the examination. Negative results were obtained for 14.3.3 eta, rheumatoid factor, CCP, and ELÍAS in November 2023.   September 2024: SSA and SSB negative.  Inflammatory markers within normal range.  20 factor x 3 negative.  September 20 24 foot x-ray:1.Bilateral plantar calcaneal spurs   2.Evidence for some degenerative change involving the first metatarsophalangeal joint spaces a little greater on the left.   3.Asymmetric soft tissue fat about the forefoot on the right as compared to the left of questionable significance   September 2024 cervical spine x-ray:  Vertebral body heights are maintained without evidence of acute fracture and alignment is anatomic without evidence of listhesis or subluxation. Multilevel spondylosis change is present, with areas of disc osteophyte complex formation and facet   arthropathy, most notable at C5-6 and C6-7. The prevertebral soft tissues are normal. The dens is intact.   September 2023 left hip x-ray:Lucency through the greater trochanter is seen on the frog lateral view only, possibly corticated. There is no definite acute osseous injury but could be further evaluated on MRI if there is clinical concern   Historically she has tried/failed plaquenil (did not work), sulfasalazine (unsure why she stopped) and methotrexate (did not help)    Currently she is taking Stelara with her GI, Dr. Jamison.  She has a history of ulcerative colitis.  Joint pain is much worse in the majority of her joints.    Carpal tunnels symptoms have worsened as well.  She is dropping things.  She has  remote history (1999) of melanoma.  No CHF, demyelinating brain disease, blood clots, heart attack, liver disease (aside from fatty liver) and diverticuli.  She does have elevated cholesterol.  She has tried and failed Plaquenil, methotrexate and sulfasalazine.    Trial Arava 10mg po daily.  Handout on arava given to patient to take home and review.    We will get approval fro GI to start Arava  Prednisone taper today for synovitis    Orders:    Comprehensive Metabolic Panel    C-reactive Protein    Sedimentation Rate    CBC & Differential     Osteoarthritis of lumbar spine, unspecified spinal osteoarthritis complication status  3/28/25: 1.  Transitional lumbosacral anatomy.   2.  Multilevel facet arthropathy with associated enhancement, most pronounced at L4-5 and L5-S1 bilaterally likely representing cellulitis. Septic arthritis is difficult to exclude. No definite enhancing epidural soft tissue.   3.  Spondylosis with no high-grade stenosis.     We referred to Dr. Sapp's office, [facility] surgery.  Physical therapy was ordered.  No surgery was recommended.  She continues in PT.  PT is not helping.  She is only going once a week.  She is doing the exercises at home.  Please note an addendum was added by radiology to state impression 2 should say enhancing synovitis not cellulitis          High risk medication use  She is currently on Stelara and Lialda for the treatment of her inflammatory bowel disease. Trial arava 10mg po daily     Labs every 2 months  She does have fatty liver and mild elevation of LFT  Will monitor closely     Fibromyalgia  No relief with flexeril  Trial baclofen. She did not receive after last visit.  Continue Duloxetine        History of melanoma  She is advised to use sunscreen aggressively, preferably with an SPF of 100         Osteoarthritis, unspecified osteoarthritis type, unspecified site   no bleeding or Burandt pain.  Doing well well well 2/12/2025: Shoulder x-ray  showed mild degenerative changes at the AC joints.  Knee x-ray showed mild bilateral tricompartmental osteoarthritis and hand x-ray showed mild osteophyte formation suggestive of mild osteophyte arthritis.  No erosive changes noted.     Continue as needed NSAID        Follow Up:   Return in about 4 months (around 10/30/2025) for AVA Harvey.        AVA Dennison  [facility]

## 2025-06-30 ENCOUNTER — TELEPHONE (OUTPATIENT)
Age: 50
End: 2025-06-30

## 2025-06-30 ENCOUNTER — OFFICE VISIT (OUTPATIENT)
Age: 50
End: 2025-06-30
Payer: MEDICAID

## 2025-06-30 VITALS
HEART RATE: 97 BPM | TEMPERATURE: 97.4 F | WEIGHT: 242.6 LBS | DIASTOLIC BLOOD PRESSURE: 84 MMHG | BODY MASS INDEX: 40.42 KG/M2 | SYSTOLIC BLOOD PRESSURE: 148 MMHG | HEIGHT: 65 IN

## 2025-06-30 DIAGNOSIS — Z85.820 HISTORY OF MELANOMA: ICD-10-CM

## 2025-06-30 DIAGNOSIS — M06.4 INFLAMMATORY POLYARTHRITIS: ICD-10-CM

## 2025-06-30 DIAGNOSIS — M06.00 RHEUMATOID ARTHRITIS WITH NEGATIVE RHEUMATOID FACTOR, INVOLVING UNSPECIFIED SITE: Primary | ICD-10-CM

## 2025-06-30 DIAGNOSIS — Z79.899 HIGH RISK MEDICATION USE: ICD-10-CM

## 2025-06-30 DIAGNOSIS — M54.2 NECK PAIN: ICD-10-CM

## 2025-06-30 DIAGNOSIS — M47.816 OSTEOARTHRITIS OF LUMBAR SPINE, UNSPECIFIED SPINAL OSTEOARTHRITIS COMPLICATION STATUS: ICD-10-CM

## 2025-06-30 DIAGNOSIS — M19.90 OSTEOARTHRITIS, UNSPECIFIED OSTEOARTHRITIS TYPE, UNSPECIFIED SITE: ICD-10-CM

## 2025-06-30 DIAGNOSIS — M25.552 PAIN OF LEFT HIP: ICD-10-CM

## 2025-06-30 DIAGNOSIS — M79.7 FIBROMYALGIA: ICD-10-CM

## 2025-06-30 RX ORDER — BACLOFEN 10 MG/1
10 TABLET ORAL 3 TIMES DAILY PRN
Qty: 90 TABLET | Refills: 5 | Status: SHIPPED | OUTPATIENT
Start: 2025-06-30

## 2025-06-30 RX ORDER — PREDNISONE 5 MG/1
TABLET ORAL
Qty: 30 TABLET | Refills: 0 | Status: SHIPPED | OUTPATIENT
Start: 2025-06-30 | End: 2025-07-12

## 2025-06-30 RX ORDER — LEFLUNOMIDE 10 MG/1
10 TABLET ORAL DAILY
Qty: 90 TABLET | Refills: 1 | Status: SHIPPED | OUTPATIENT
Start: 2025-06-30

## 2025-06-30 NOTE — TELEPHONE ENCOUNTER
Called Dr. Garcia's office at [phone].  Spoke with Nancy.  She is sending Dr. Garcia a message and will call us back with his response.

## 2025-06-30 NOTE — TELEPHONE ENCOUNTER
Please call Dr Melania BRITTON and see if he is fine with patient taking Arava for her RA?  RA is flared and she has failed other DMARDs.  Cannot add on another biologic since she is on stelara.

## 2025-07-01 ENCOUNTER — TELEPHONE (OUTPATIENT)
Age: 50
End: 2025-07-01
Payer: MEDICAID

## 2025-07-01 LAB
ALBUMIN SERPL-MCNC: 4.5 G/DL (ref 3.9–4.9)
ALP SERPL-CCNC: 134 IU/L (ref 44–121)
ALT SERPL-CCNC: 71 IU/L (ref 0–32)
AST SERPL-CCNC: 77 IU/L (ref 0–40)
BASOPHILS # BLD AUTO: 0 X10E3/UL (ref 0–0.2)
BASOPHILS NFR BLD AUTO: 1 %
BILIRUB SERPL-MCNC: 0.4 MG/DL (ref 0–1.2)
BUN SERPL-MCNC: 15 MG/DL (ref 6–24)
BUN/CREAT SERPL: 23 (ref 9–23)
CALCIUM SERPL-MCNC: 9.7 MG/DL (ref 8.7–10.2)
CHLORIDE SERPL-SCNC: 103 MMOL/L (ref 96–106)
CO2 SERPL-SCNC: 22 MMOL/L (ref 20–29)
CREAT SERPL-MCNC: 0.64 MG/DL (ref 0.57–1)
CRP SERPL-MCNC: 7 MG/L (ref 0–10)
EGFRCR SERPLBLD CKD-EPI 2021: 108 ML/MIN/1.73
EOSINOPHIL # BLD AUTO: 0.2 X10E3/UL (ref 0–0.4)
EOSINOPHIL NFR BLD AUTO: 3 %
ERYTHROCYTE [DISTWIDTH] IN BLOOD BY AUTOMATED COUNT: 13.4 % (ref 11.7–15.4)
ERYTHROCYTE [SEDIMENTATION RATE] IN BLOOD BY WESTERGREN METHOD: 38 MM/HR (ref 0–32)
GLOBULIN SER CALC-MCNC: 2.4 G/DL (ref 1.5–4.5)
GLUCOSE SERPL-MCNC: 121 MG/DL (ref 70–99)
HCT VFR BLD AUTO: 43 % (ref 34–46.6)
HGB BLD-MCNC: 13.9 G/DL (ref 11.1–15.9)
IMM GRANULOCYTES # BLD AUTO: 0.1 X10E3/UL (ref 0–0.1)
IMM GRANULOCYTES NFR BLD AUTO: 1 %
LYMPHOCYTES # BLD AUTO: 1.8 X10E3/UL (ref 0.7–3.1)
LYMPHOCYTES NFR BLD AUTO: 24 %
MCH RBC QN AUTO: 30.6 PG (ref 26.6–33)
MCHC RBC AUTO-ENTMCNC: 32.3 G/DL (ref 31.5–35.7)
MCV RBC AUTO: 95 FL (ref 79–97)
MONOCYTES # BLD AUTO: 0.5 X10E3/UL (ref 0.1–0.9)
MONOCYTES NFR BLD AUTO: 7 %
NEUTROPHILS # BLD AUTO: 4.8 X10E3/UL (ref 1.4–7)
NEUTROPHILS NFR BLD AUTO: 64 %
PLATELET # BLD AUTO: 244 X10E3/UL (ref 150–450)
POTASSIUM SERPL-SCNC: 4.5 MMOL/L (ref 3.5–5.2)
PROT SERPL-MCNC: 6.9 G/DL (ref 6–8.5)
RBC # BLD AUTO: 4.54 X10E6/UL (ref 3.77–5.28)
SODIUM SERPL-SCNC: 143 MMOL/L (ref 134–144)
WBC # BLD AUTO: 7.4 X10E3/UL (ref 3.4–10.8)

## 2025-07-01 NOTE — TELEPHONE ENCOUNTER
CALLED PATRICK AT DR. AGUILERA'S OFFICE TO SEE IF HE RESPONDED.  SHE STATED THAT SHE CALLED EARLIER AND SPOKE TO SOMEONE AND THEY ASKED THAT SHE FAX DR. AGUILERA'S RESPONSE.  I CHECKED THROUGH OUR FAXES TO SEE IF IT WAS RECEIVED AND DIDN'T SEE ANYTHING.  ASKED HER TO REFAX [phone].    RESPONSE RECEIVED.

## 2025-07-03 ENCOUNTER — HOSPITAL ENCOUNTER (OUTPATIENT)
Dept: PHYSICAL THERAPY | Facility: HOSPITAL | Age: 50
Setting detail: THERAPIES SERIES
Discharge: HOME | End: 2025-07-03

## 2025-07-03 NOTE — CARE COORDINATION
[facility]  Cancel/No Show Note    Service:  [x]Physical Therapy  []Occupational Therapy    Date:7/3/25    Reason For Missed Treatment:  []Illness  []Provider unavailable  []No call/no show  []Late cancellation (<24 hours)  []Arrived >15 minutes late  []Other appointment  []Scheduling conflict  []Arrived on wrong day  []Physician discharged tx  []Transportation conflict  []Inclement weather  []Frequency of tx change  []No reason given  []Arrived but refused participation  []Unable to tolerate  []Medical complications  []Pt on hold due to-  [x]Other-     Plan:  [x]Rescheduled  []Continue at next scheduled apt  []Reminder text message  []Reminder phone call  []Contacted referring provider  []Discharged

## 2025-07-10 ENCOUNTER — HOSPITAL ENCOUNTER (OUTPATIENT)
Dept: PHYSICAL THERAPY | Facility: HOSPITAL | Age: 50
Setting detail: THERAPIES SERIES
Discharge: HOME | End: 2025-07-10

## 2025-07-10 NOTE — CARE COORDINATION
[facility]  Cancel/No Show Note    Service:  [x]Physical Therapy  []Occupational Therapy    Date:7/10/25    Reason For Missed Treatment:  []Illness  []Provider unavailable  []No call/no show  []Late cancellation (<24 hours)  []Arrived >15 minutes late  []Other appointment  []Scheduling conflict  []Arrived on wrong day  []Physician discharged tx  []Transportation conflict  []Inclement weather  []Frequency of tx change  []No reason given  []Arrived but refused participation  []Unable to tolerate  []Medical complications  []Pt on hold due to-  []Other-     Plan:  []Rescheduled  []Continue at next scheduled apt  []Reminder text message  []Reminder phone call  []Contacted referring provider  [x]Discharged

## 2025-07-31 ENCOUNTER — PATIENT ROUNDING (BHMG ONLY) (OUTPATIENT)
Dept: URGENT CARE | Facility: CLINIC | Age: 50
End: 2025-07-31
Payer: MEDICAID

## (undated) DEVICE — GLV SURG SENSICARE W/ALOE PF LF 7.5 STRL

## (undated) DEVICE — PK EXTRM UPPR 20

## (undated) DEVICE — SUT VIC 0 CT 18IN VIL J752D

## (undated) DEVICE — SUT ETHLN 4/0 PS2 PLSTC 1667G

## (undated) DEVICE — SPNG GZ WOVN 4X4IN 12PLY 10/BX STRL

## (undated) DEVICE — BNDG ELAS MATRX V/CLS 6IN 5YD LF

## (undated) DEVICE — BNDG ELAS ELITE V/CLOSE 4IN 5YD LF STRL

## (undated) DEVICE — BNDG ELAS MATRX V/CLS 3INX5YD LF

## (undated) DEVICE — PK EXTRM LOWR 20

## (undated) DEVICE — CLAVICLE STRAP: Brand: DEROYAL

## (undated) DEVICE — SUT VIC 2/0 CT1 27IN J259H

## (undated) DEVICE — GLV SURG SENSICARE SLT PF LF 8 STRL

## (undated) DEVICE — FLEXIBLE YANKAUER,MEDIUM TIP, NO VACUUM CONTROL: Brand: ARGYLE

## (undated) DEVICE — CUFF SCD HEMOFORCE SEQ CALF STD MD

## (undated) DEVICE — DRSNG WND GZ CURAD OIL EMULSION 3X3IN STRL

## (undated) DEVICE — NON-ADHERENT STRIPS,OIL EMULSION: Brand: CURITY

## (undated) DEVICE — IMPLANTABLE DEVICE
Type: IMPLANTABLE DEVICE | Site: ANKLE | Status: NON-FUNCTIONAL
Removed: 2020-09-25

## (undated) DEVICE — DRSNG SURESITE WNDW 4X4.5

## (undated) DEVICE — DISPOSABLE TOURNIQUET CUFF SINGLE BLADDER, SINGLE PORT AND QUICK CONNECT CONNECTOR: Brand: COLOR CUFF

## (undated) DEVICE — GLV SURG SENSICARE ORTHO PF LF 8 STRL

## (undated) DEVICE — GLV SURG SENSICARE W/ALOE PF LF 8 STRL

## (undated) DEVICE — STERILE CAST PADDING KIT: Brand: CARDINAL HEALTH

## (undated) DEVICE — SCRW CORT S/TAP 3.5X20MM
Type: IMPLANTABLE DEVICE | Site: ANKLE | Status: NON-FUNCTIONAL
Removed: 2020-09-25